# Patient Record
Sex: MALE | Race: WHITE | NOT HISPANIC OR LATINO | Employment: OTHER | URBAN - METROPOLITAN AREA
[De-identification: names, ages, dates, MRNs, and addresses within clinical notes are randomized per-mention and may not be internally consistent; named-entity substitution may affect disease eponyms.]

---

## 2019-01-08 ENCOUNTER — TELEPHONE (OUTPATIENT)
Dept: ENDOSCOPY | Facility: HOSPITAL | Age: 74
End: 2019-01-08

## 2019-01-08 DIAGNOSIS — K22.719 BARRETT'S ESOPHAGUS WITH DYSPLASIA: Primary | ICD-10-CM

## 2019-01-08 NOTE — TELEPHONE ENCOUNTER
----- Message from Elif Funez sent at 1/8/2019  8:58 AM CST -----  Regarding: Outside patient referral  Good morning!    Patient being referred to Dr. Victor for EGD with Halo for Horner's. Referral and records scanned into .    Thanks!  Elif

## 2019-01-08 NOTE — TELEPHONE ENCOUNTER
Message   Received: Today   Message Contents   MD Shaniqua Jack MA   Caller: Unspecified (Today, 10:19 AM)             Need EGD with RFA for Horner's with LGD.   Dimitri Leary MD      Please sign order

## 2019-01-09 ENCOUNTER — TELEPHONE (OUTPATIENT)
Dept: ENDOSCOPY | Facility: HOSPITAL | Age: 74
End: 2019-01-09

## 2019-01-09 RX ORDER — AMIODARONE HYDROCHLORIDE 200 MG/1
100 TABLET ORAL DAILY
COMMUNITY

## 2019-01-09 RX ORDER — CHOLECALCIFEROL (VITAMIN D3) 25 MCG
1 TABLET,CHEWABLE ORAL DAILY
Status: ON HOLD | COMMUNITY
End: 2021-04-09 | Stop reason: CLARIF

## 2019-01-09 RX ORDER — OMEPRAZOLE 20 MG/1
20 CAPSULE, DELAYED RELEASE ORAL DAILY
Status: ON HOLD | COMMUNITY
End: 2019-01-17 | Stop reason: SDUPTHER

## 2019-01-09 RX ORDER — ATORVASTATIN CALCIUM 80 MG/1
80 TABLET, FILM COATED ORAL NIGHTLY
COMMUNITY

## 2019-01-09 RX ORDER — ASPIRIN 81 MG/1
81 TABLET ORAL DAILY
COMMUNITY

## 2019-01-09 RX ORDER — ASCORBIC ACID 500 MG
500 TABLET ORAL DAILY
COMMUNITY
End: 2021-05-11 | Stop reason: ALTCHOICE

## 2019-01-09 RX ORDER — FERROUS SULFATE 325(65) MG
325 TABLET ORAL 2 TIMES DAILY
COMMUNITY

## 2019-01-09 NOTE — TELEPHONE ENCOUNTER
Spoke with patient. EGD scheduled for 1/17 at 10;30a. Reviewed prep instructions. Mr Gamble verbalized understanding.

## 2019-01-09 NOTE — TELEPHONE ENCOUNTER
Patient's wife called back.  Discussed instructions for EGD scheduled 1/17/19 at 1030.  Instructions emailed and mailed.

## 2019-01-17 ENCOUNTER — ANESTHESIA (OUTPATIENT)
Dept: ENDOSCOPY | Facility: HOSPITAL | Age: 74
End: 2019-01-17
Payer: MEDICARE

## 2019-01-17 ENCOUNTER — ANESTHESIA EVENT (OUTPATIENT)
Dept: ENDOSCOPY | Facility: HOSPITAL | Age: 74
End: 2019-01-17
Payer: MEDICARE

## 2019-01-17 ENCOUNTER — HOSPITAL ENCOUNTER (OUTPATIENT)
Facility: HOSPITAL | Age: 74
Discharge: HOME | End: 2019-01-17
Attending: INTERNAL MEDICINE | Admitting: INTERNAL MEDICINE
Payer: MEDICARE

## 2019-01-17 VITALS
HEIGHT: 72 IN | RESPIRATION RATE: 20 BRPM | SYSTOLIC BLOOD PRESSURE: 107 MMHG | HEART RATE: 70 BPM | DIASTOLIC BLOOD PRESSURE: 65 MMHG | BODY MASS INDEX: 33.86 KG/M2 | WEIGHT: 250 LBS | OXYGEN SATURATION: 94 % | TEMPERATURE: 98 F

## 2019-01-17 DIAGNOSIS — K22.719 BARRETT'S ESOPHAGUS WITH DYSPLASIA: Primary | ICD-10-CM

## 2019-01-17 PROCEDURE — 37000009 HC ANESTHESIA EA ADD 15 MINS: Performed by: INTERNAL MEDICINE

## 2019-01-17 PROCEDURE — D9220A PRA ANESTHESIA: Mod: ANES,,, | Performed by: ANESTHESIOLOGY

## 2019-01-17 PROCEDURE — 43270 PR EGD, FLEX, W/ABLATION, TUMOR/POLYP/LESION(S), W/ DILATION: ICD-10-PCS | Mod: ,,, | Performed by: INTERNAL MEDICINE

## 2019-01-17 PROCEDURE — 43270 EGD LESION ABLATION: CPT | Performed by: INTERNAL MEDICINE

## 2019-01-17 PROCEDURE — 63600175 PHARM REV CODE 636 W HCPCS: Performed by: NURSE ANESTHETIST, CERTIFIED REGISTERED

## 2019-01-17 PROCEDURE — 37000008 HC ANESTHESIA 1ST 15 MINUTES: Performed by: INTERNAL MEDICINE

## 2019-01-17 PROCEDURE — 43270 EGD LESION ABLATION: CPT | Mod: ,,, | Performed by: INTERNAL MEDICINE

## 2019-01-17 PROCEDURE — D9220A PRA ANESTHESIA: ICD-10-PCS | Mod: ANES,,, | Performed by: ANESTHESIOLOGY

## 2019-01-17 PROCEDURE — 25000003 PHARM REV CODE 250: Performed by: INTERNAL MEDICINE

## 2019-01-17 PROCEDURE — D9220A PRA ANESTHESIA: ICD-10-PCS | Mod: CRNA,,, | Performed by: NURSE ANESTHETIST, CERTIFIED REGISTERED

## 2019-01-17 PROCEDURE — 25000003 PHARM REV CODE 250: Performed by: NURSE ANESTHETIST, CERTIFIED REGISTERED

## 2019-01-17 PROCEDURE — D9220A PRA ANESTHESIA: Mod: CRNA,,, | Performed by: NURSE ANESTHETIST, CERTIFIED REGISTERED

## 2019-01-17 PROCEDURE — C1886 CATHETER, ABLATION: HCPCS | Performed by: INTERNAL MEDICINE

## 2019-01-17 RX ORDER — LIDOCAINE HCL/PF 100 MG/5ML
SYRINGE (ML) INTRAVENOUS
Status: DISCONTINUED | OUTPATIENT
Start: 2019-01-17 | End: 2019-01-17

## 2019-01-17 RX ORDER — SODIUM CHLORIDE 9 MG/ML
INJECTION, SOLUTION INTRAVENOUS CONTINUOUS
Status: DISCONTINUED | OUTPATIENT
Start: 2019-01-17 | End: 2019-01-18 | Stop reason: HOSPADM

## 2019-01-17 RX ORDER — SUCRALFATE 1 G/1
TABLET ORAL
Qty: 60 TABLET | Refills: 0 | Status: ON HOLD | OUTPATIENT
Start: 2019-01-17 | End: 2019-04-16 | Stop reason: SDUPTHER

## 2019-01-17 RX ORDER — GLYCOPYRROLATE 0.2 MG/ML
INJECTION INTRAMUSCULAR; INTRAVENOUS
Status: DISCONTINUED | OUTPATIENT
Start: 2019-01-17 | End: 2019-01-17

## 2019-01-17 RX ORDER — SODIUM CHLORIDE 0.9 % (FLUSH) 0.9 %
3 SYRINGE (ML) INJECTION
Status: DISCONTINUED | OUTPATIENT
Start: 2019-01-17 | End: 2019-01-18 | Stop reason: HOSPADM

## 2019-01-17 RX ORDER — PROPOFOL 10 MG/ML
INJECTION, EMULSION INTRAVENOUS
Status: DISCONTINUED | OUTPATIENT
Start: 2019-01-17 | End: 2019-01-17

## 2019-01-17 RX ORDER — OMEPRAZOLE 20 MG/1
40 CAPSULE, DELAYED RELEASE ORAL 2 TIMES DAILY
Qty: 120 CAPSULE | Refills: 2 | Status: ON HOLD | OUTPATIENT
Start: 2019-01-17 | End: 2019-04-16 | Stop reason: SDUPTHER

## 2019-01-17 RX ORDER — OMEPRAZOLE 20 MG/1
40 CAPSULE, DELAYED RELEASE ORAL 2 TIMES DAILY
Qty: 120 CAPSULE | Refills: 2 | Status: SHIPPED | OUTPATIENT
Start: 2019-01-17 | End: 2019-01-17 | Stop reason: SDUPTHER

## 2019-01-17 RX ORDER — PROPOFOL 10 MG/ML
INJECTION, EMULSION INTRAVENOUS CONTINUOUS PRN
Status: DISCONTINUED | OUTPATIENT
Start: 2019-01-17 | End: 2019-01-17

## 2019-01-17 RX ORDER — FENTANYL CITRATE 50 UG/ML
INJECTION, SOLUTION INTRAMUSCULAR; INTRAVENOUS
Status: DISCONTINUED | OUTPATIENT
Start: 2019-01-17 | End: 2019-01-17

## 2019-01-17 RX ORDER — HYDROCODONE BITARTRATE AND ACETAMINOPHEN 7.5; 325 MG/15ML; MG/15ML
15 SOLUTION ORAL EVERY 6 HOURS PRN
Qty: 420 ML | Refills: 0 | Status: SHIPPED | OUTPATIENT
Start: 2019-01-17 | End: 2019-01-24

## 2019-01-17 RX ADMIN — PROPOFOL 30 MG: 10 INJECTION, EMULSION INTRAVENOUS at 10:01

## 2019-01-17 RX ADMIN — PROPOFOL 70 MG: 10 INJECTION, EMULSION INTRAVENOUS at 10:01

## 2019-01-17 RX ADMIN — SODIUM CHLORIDE: 0.9 INJECTION, SOLUTION INTRAVENOUS at 10:01

## 2019-01-17 RX ADMIN — PROPOFOL 200 MCG/KG/MIN: 10 INJECTION, EMULSION INTRAVENOUS at 10:01

## 2019-01-17 RX ADMIN — LIDOCAINE HYDROCHLORIDE 100 MG: 20 INJECTION, SOLUTION INTRAVENOUS at 10:01

## 2019-01-17 RX ADMIN — FENTANYL CITRATE 25 MCG: 50 INJECTION, SOLUTION INTRAMUSCULAR; INTRAVENOUS at 10:01

## 2019-01-17 RX ADMIN — GLYCOPYRROLATE 0.2 MG: 0.2 INJECTION, SOLUTION INTRAMUSCULAR; INTRAVENOUS at 10:01

## 2019-01-17 NOTE — DISCHARGE SUMMARY
Discharge Summary/Instructions after Ablation (Burning or Freezing) of Horner's Esophagus using Radiofrequency Ablation or Cryotherapy    Patient Name: Devon Berry  Patient MRN: [id number]  Patient YOB: 1945 Thursday, January 17, 2019  Dimitri Leary MD    You have undergone an upper endoscopy with ablation (burning or freezing) of Horner's esophagus.  You may experience one of the following symptoms after treatment:  chest discomfort, sore throat, difficulty or pain with swallowing, and/or nausea/vomiting.  These symptoms should improve with each day.  You will be provided with several medications and specific instructions (below) to make you as comfortable as possible during this time.  Should any of your symptoms be more severe in nature or longer in duration than we have described, please contact us.  It is important to continue a strict and long-term regimen of anti-secretory medication after this treatment, such as Nexium, Prevacid or other similar drugs.    - Maximize anti-secretory regimen (for example, Nexium 40 mg twice per day for 1-3 months, followed by at least 40 mg per day thereafter).    - Liquid Tylenol with or without codeine by mouth as needed.    - Avoid aspirin or non-steroidal anti-inflammatory medications for 7 days.     - Contact your treating physician immediately for significant chest pain, difficulty swallowing, fever, bleeding, abdominal pain,      difficulty breathing, vomiting.    - If you seek care for a digestive issue from any healthcare provider in the 6 months following the ablation procedure, the physician     who performed the ablation should be consulted before any treatment is initiated.    Your doctor recommends these additional instructions:    You are being discharged to home.   Your physician has recommended a repeat upper endoscopy in 12 weeks for follow-up of Horner's ablation.   Resume your previous diet.   Take Prilosec (omeprazole) 40 mg by mouth  twice a day.   Take Carafate (sucralfate) suspension 1 gram by mouth twice a day.    If you have any questions or problems, please call your physician.  EMERGENCY PHONE NUMBER: [phone]  LAB RESULTS: [phone]

## 2019-01-17 NOTE — H&P
History & Physical - Short Stay  Gastroenterology      SUBJECTIVE:     Procedure: EGD    Chief Complaint/Indication for Procedure: Horner's Esophagus    History of Present Illness:  Patient is a 73 y.o. male presents with Horner's with LGD here for ablation therapy.     PTA Medications   Medication Sig    amiodarone (PACERONE) 200 MG Tab Take 100 mg by mouth once daily.    ascorbic acid, vitamin C, (VITAMIN C) 500 MG tablet Take 500 mg by mouth once daily.    aspirin (ECOTRIN) 81 MG EC tablet Take 81 mg by mouth once daily.    atorvastatin (LIPITOR) 20 MG tablet Take 20 mg by mouth every evening.     cyanocobalamin, vitamin B-12, 2,500 mcg Chew Take 1 tablet by mouth once daily.    ferrous sulfate (FEOSOL) 325 mg (65 mg iron) Tab tablet Take 325 mg by mouth 2 (two) times daily.     multivitamin with minerals tablet Take 1 tablet by mouth once daily.    omeprazole (PRILOSEC) 20 MG capsule Take 20 mg by mouth once daily.       Review of patient's allergies indicates:  No Known Allergies     Past Medical History:   Diagnosis Date    Horner's esophagus     CAD (coronary artery disease)     Colon cancer     Colon polyps     Diverticulosis of colon     GERD (gastroesophageal reflux disease)     Heartburn     Hemorrhoids     HTN (hypertension)     Hyperlipidemia     Pneumonia      Past Surgical History:   Procedure Laterality Date    COLONOSCOPY      CORONARY ARTERY BYPASS GRAFT  03/04/2016    3 vessels    ESOPHAGOGASTRODUODENOSCOPY      HEMORRHOID SURGERY      SIGMOIDECTOMY  2015    TONSILLECTOMY, ADENOIDECTOMY       Family History   Problem Relation Age of Onset    Heart disease Father      Social History     Tobacco Use    Smoking status: Current Every Day Smoker     Types: Cigarettes    Smokeless tobacco: Never Used   Substance Use Topics    Alcohol use: Yes     Comment: 3.6 oz/week    Drug use: No       Review of Systems:  Constitutional: no fever or chills  ENT: positive for nasal  congestion  Respiratory: positive for cough  Cardiovascular: no chest pain or palpitations  Gastrointestinal: no nausea or vomiting, no abdominal pain or change in bowel habits, positive for reflux symptoms    OBJECTIVE:     Vital Signs (Most Recent)       Physical Exam:  General: well developed, well nourished  Lungs:  clear to auscultation bilaterally and normal respiratory effort  Heart: regular rate, S1, S2 normal  Abdomen: soft, non-tender non-distented; bowel sounds normal; no masses,  no organomegaly    Laboratory  CBC: No results for input(s): WBC, RBC, HGB, HCT, PLT, MCV, MCH, MCHC in the last 168 hours.  CMP: No results for input(s): GLU, CALCIUM, ALBUMIN, PROT, NA, K, CO2, CL, BUN, CREATININE, ALKPHOS, ALT, AST, BILITOT in the last 168 hours.  Coagulation: No results for input(s): LABPROT, INR, APTT in the last 168 hours.      Diagnostic Results:      ASSESSMENT/PLAN:     Horner's with LGD    Plan: EGD    Anesthesia Plan: MAC    ASA Grade: ASA 3 - Patient with moderate systemic disease with functional limitations     The impression and plan was discussed in detail with the patient and family. All questions have been answered and the patient voices understanding of our plan at this point. The risk of the procedure was discussed in detail which includes but not limited to bleeding, infection, perforation in some cases requiring surgery with its spectrum of complications.

## 2019-01-17 NOTE — [employer] PATIENT INSTRUCTIONS
Discharge Summary/Instructions after Ablation (Burning or Freezing) of   Horner's Esophagus using Radiofrequency Ablation or Cryotherapy  Patient Name: Devon Berry  Patient MRN: [id number]  Patient YOB: 1945 Thursday, January 17, 2019  Dimitri Leary MD  You have undergone an upper endoscopy with ablation (burning or freezing) of   Horner's esophagus.  You may experience one of the following symptoms   after treatment:  chest discomfort, sore throat, difficulty or pain with   swallowing, and/or nausea/vomiting.  These symptoms should improve with   each day.  You will be provided with several medications and specific   instructions (below) to make you as comfortable as possible during this   time.  Should any of your symptoms be more severe in nature or longer in   duration than we have described, please contact us.  It is important to   continue a strict and long-term regimen of anti-secretory medication after   this treatment, such as Nexium, Prevacid or other similar drugs.  - Maximize anti-secretory regimen (for example, Nexium 40 mg twice per day   for 1-3 months, followed by at least 40 mg per day thereafter).  - Liquid Tylenol with or without codeine by mouth as needed.  - Full liquid diet for 24 hours, then advancing to soft diet for 1 week.  - Avoid aspirin or non-steroidal anti-inflammatory medications for 7 days.      - Contact your treating physician immediately for significant chest pain,   difficulty swallowing, fever, bleeding, abdominal pain,      difficulty breathing, vomiting.  - If you seek care for a digestive issue from any healthcare provider in the   6 months following the ablation procedure, the physician     who performed the ablation should be consulted before any treatment is   initiated.  Your doctor recommends these additional instructions:  You are being discharged to home.   Your physician has recommended a repeat upper endoscopy in 12 weeks for   follow-up of  Horner's ablation.   Resume your previous diet.   Take Prilosec (omeprazole) 40 mg by mouth twice a day.   Take Carafate (sucralfate) suspension 1 gram by mouth twice a day.  If you have any questions or problems, please call your physician.  EMERGENCY PHONE NUMBER: [phone]  LAB RESULTS: [phone]  Dimitri Leary MD  1/17/2019 11:07:08 AM  This report has been verified and signed electronically.  [employer]

## 2019-01-17 NOTE — ANESTHESIA PREPROCEDURE EVALUATION
01/17/2019  Devon Berry is a 73 y.o., male.    Anesthesia Evaluation         Review of Systems  Social:  Smoker, No Alcohol Use   Hematology/Oncology:        Oncology Comments: Colon cancer   Cardiovascular:   Hypertension Past MI CAD  CABG/stent Dysrhythmias atrial fibrillation hyperlipidemia EF-30%   Hepatic/GI:   GERD Horner's esophagus,  Diverticulosis of colon,  Colon cancer       Physical Exam  General:  Well nourished    Airway/Jaw/Neck:  Airway Findings: Mouth Opening: Normal Tongue: Normal  General Airway Assessment: Adult  Mallampati: III  TM Distance: Normal, at least 6 cm      Dental:  Dental Findings: In tact        Mental Status:  Mental Status Findings:  Alert and Oriented, Cooperative         Anesthesia Plan  Type of Anesthesia, risks & benefits discussed:  Anesthesia Type:  general  Patient's Preference:   Intra-op Monitoring Plan: standard ASA monitors  Intra-op Monitoring Plan Comments:   Post Op Pain Control Plan:   Post Op Pain Control Plan Comments:   Induction:   IV  Beta Blocker:  Patient is not currently on a Beta-Blocker (No further documentation required).       Informed Consent: Patient understands risks and agrees with Anesthesia plan.  Questions answered. Anesthesia consent signed with patient.  ASA Score: 3     Day of Surgery Review of History & Physical:    H&P update referred to the provider.         Ready For Surgery From Anesthesia Perspective.

## 2019-01-17 NOTE — DISCHARGE INSTRUCTIONS
Upper GI Endoscopy     During endoscopy, a long, flexible tube is used to view the inside of your upper GI tract.      Upper GI endoscopy allows your healthcare provider to look directly into the beginning of your gastrointestinal (GI) tract. The esophagus, stomach, and duodenum (the first part of the small intestine) make up the upper GI tract.   Before the exam  Follow these and any other instructions you are given before your endoscopy. If you dont follow the healthcare providers instructions carefully, the test may need to be canceled or done over:  · Don't eat or drink anything after midnight the night before your exam. If your exam is in the afternoon, drink only clear liquids in the morning. Don't eat or drink anything for 8 hours before the exam. In some cases, you may be able to take medicines with sips of water until 2 hours before the procedure. Speak with your healthcare provider about this.   · Bring your X-rays and any other test results you have.  · Because you will be sedated, arrange for an adult to drive you home after the exam.  · Tell your healthcare provider before the exam if you are taking any medicines or have any medical problems.  The procedure  Here is what to expect:  · You will lie on the endoscopy table. Usually patients lie on the left side.  · You will be monitored and given oxygen.  · Your throat may be numbed with a spray or gargle. You are given medicine through an intravenous (IV) line that will help you relax and remain comfortable. You may be awake or asleep during the procedure.  · The healthcare provider will put the endoscope in your mouth and down your esophagus. It is thinner than most pieces of food that you swallow. It will not affect your breathing. The medicine helps keep you from gagging.  · Air is put into your GI tract to expand it. It can make you burp.  · During the procedure, the healthcare provider can take biopsies (tissue samples), remove abnormalities,  such as polyps, or treat abnormalities through a variety of devices placed through the endoscope. You will not feel this.   · The endoscope carries images of your upper GI tract to a video screen. If you are awake, you may be able to look at the images.  · After the procedure is done, you will rest for a time. An adult must drive you home.  When to call your healthcare provider  Contact your healthcare provider if you have:  · Black or tarry stools, or blood in your stool  · Fever  · Pain in your belly that does not go away  · Nausea and vomiting, or vomiting blood   Date Last Reviewed: 7/1/2016 © 2000-2017 [employer]. [address] [place] [address], [place] [address]. All rights reserved. This information is not intended as a substitute for professional medical care. Always follow your healthcare professional's instructions.        Anesthesia: General Anesthesia     You are watched continuously during your procedure by your anesthesia provider.     Youre due to have surgery. During surgery, youll be given medicine called anesthesia or anesthetic. This will keep you comfortable and pain-free. Your anesthesia provider will use general anesthesia.  What is general anesthesia?  General anesthesia puts you into a state like deep sleep. It goes into the bloodstream (IV anesthetics), into the lungs (gas anesthetics), or both. You feel nothing during the procedure. You will not remember it. During the procedure, the anesthesia provider monitors you continuously. He or she checks your heart rate and rhythm, blood pressure, breathing, and blood oxygen.  · IV anesthetics. IV anesthetics are given through an IV line in your arm. Theyre often given first. This is so you are asleep before a gas anesthetic is started. Some kinds of IV anesthetics relieve pain. Others relax you. Your doctor will decide which kind is best in your case.  · Gas anesthetics. Gas anesthetics are breathed into the lungs. They are often used  to keep you asleep. They can be given through a facemask or a tube placed in your larynx or trachea (breathing tube).  ¨ If you have a facemask, your anesthesia provider will most likely place it over your nose and mouth while youre still awake. Youll breathe oxygen through the mask as your IV anesthetic is started. Gas anesthetic may be added through the mask.  ¨ If you have a tube in the larynx or trachea, it will be inserted into your throat after youre asleep.  Anesthesia tools and medicines  You will likely have:  · IV anesthetics. These are put into an IV line into your bloodstream.  · Gas anesthetics. You breathe these anesthetics into your lungs, where they pass into your bloodstream.  · Pulse oximeter. This is a small clip that is attached to the end of your finger. This measures your blood oxygen level.  · Electrocardiography leads (electrodes). These are small sticky pads that are placed on your chest. They record your heart rate and rhythm.  · Blood pressure cuff. This reads your blood pressure.  Risks and possible complications  General anesthesia has some risks. These include:  · Breathing problems  · Nausea and vomiting  · Sore throat or hoarseness (usually temporary)  · Allergic reaction to the anesthetic  · Irregular heartbeat (rare)  · Cardiac arrest (rare)   Anesthesia safety  · Follow all instructions you are given for how long not to eat or drink before your procedure.  · Be sure your doctor knows what medicines and drugs you take. This includes over-the-counter medicines, herbs, supplements, alcohol or other drugs. You will be asked when those were last taken.  · Have an adult family member or friend drive you home after the procedure.  · For the first 24 hours after your surgery:  ¨ Do not drive or use heavy equipment.  ¨ Do not make important decisions or sign legal documents. If important decisions or signing legal documents is necessary during the first 24 hours after surgery, have a  trusted family member or spouse act on your behalf.  ¨ Avoid alcohol.  ¨ Have a responsible adult stay with you. He or she can watch for problems and help keep you safe.  Date Last Reviewed: 12/1/2016  © 2053-4485 [employer]. [address] [place] [address], [place] [address]. All rights reserved. This information is not intended as a substitute for professional medical care. Always follow your healthcare professional's instructions.

## 2019-01-17 NOTE — TRANSFER OF CARE
Anesthesia Transfer of Care Note    Patient: Devon Brery    Procedure(s) Performed: Procedure(s) (LRB):  EGD (ESOPHAGOGASTRODUODENOSCOPY) (N/A)    Patient location: [facility]    Anesthesia Type: general    Transport from OR: Transported from OR on 6-10 L/min O2 by face mask with adequate spontaneous ventilation    Post pain: adequate analgesia    Post assessment: no apparent anesthetic complications and tolerated procedure well    Post vital signs: stable    Level of consciousness: awake and responds to stimulation    Nausea/Vomiting: no nausea/vomiting    Complications: none    Transfer of care protocol was followed      Last vitals:   Visit Vitals  BP (!) 99/57 (BP Location: Left arm, Patient Position: Lying)   Pulse 76   Temp 36.4 °C (97.5 °F) (Skin)   Resp 18   Ht 6' (1.829 m)   Wt 113.4 kg (250 lb)   SpO2 97%   BMI 33.91 kg/m²

## 2019-01-18 ENCOUNTER — TELEPHONE (OUTPATIENT)
Dept: ENDOSCOPY | Facility: HOSPITAL | Age: 74
End: 2019-01-18

## 2019-01-18 DIAGNOSIS — K22.719 BARRETT'S ESOPHAGUS WITH DYSPLASIA: Primary | ICD-10-CM

## 2019-01-18 NOTE — ANESTHESIA POSTPROCEDURE EVALUATION
Anesthesia Post Evaluation    Patient: Devon Berry    Procedure(s) Performed: Procedure(s) (LRB):  EGD (ESOPHAGOGASTRODUODENOSCOPY) (N/A)    Final Anesthesia Type: general  Patient location during evaluation: PACU  Patient participation: Yes- Able to Participate  Level of consciousness: awake and alert  Post-procedure vital signs: reviewed and stable  Pain management: adequate  Airway patency: patent  PONV status at discharge: No PONV  Anesthetic complications: no      Cardiovascular status: blood pressure returned to baseline  Respiratory status: unassisted  Hydration status: euvolemic  Follow-up not needed.        Visit Vitals  /65 (BP Location: Left arm, Patient Position: Lying)   Pulse 70   Temp 36.5 °C (97.7 °F) (Skin)   Resp 20   Ht 6' (1.829 m)   Wt 113.4 kg (250 lb)   SpO2 (!) 94%   BMI 33.91 kg/m²       Pain/Angel Score: Angel Score: 10 (1/17/2019 11:35 AM)

## 2019-02-11 ENCOUNTER — TELEPHONE (OUTPATIENT)
Dept: ENDOSCOPY | Facility: HOSPITAL | Age: 74
End: 2019-02-11

## 2019-02-11 NOTE — TELEPHONE ENCOUNTER
MD Shaniqua Jack MA   Caller: Unspecified (Today, 12:02 PM)             Ok to stop the Carafate. I will continue the same dose of the PPI's. If not improvement in 2-3 days then he need to contact us for recommendations.   Thanks   Dimitri Leary MD     Left message

## 2019-02-11 NOTE — TELEPHONE ENCOUNTER
----- Message from Esha Byers sent at 2/8/2019  9:34 AM CST -----  Contact: Self- [phone]  Gibran- pt called to speak with Stephanie regarding some issues following scope last week- gas in stomach and change in bowel habits- only details relayed during call- please contact pt at [phone]

## 2019-02-11 NOTE — TELEPHONE ENCOUNTER
Patient says he is having a lot of gas and loose stools. He says it seem to start after the started taking carafate and increased PPI. He stopped the carafate. Wants to know if ok. Also wants to know if he should decrease PPI

## 2019-02-12 ENCOUNTER — TELEPHONE (OUTPATIENT)
Dept: ENDOSCOPY | Facility: HOSPITAL | Age: 74
End: 2019-02-12

## 2019-02-12 NOTE — TELEPHONE ENCOUNTER
Spoke with patient. Informed of Dr Leary's message from yesterday. Mr Berry verbalized understanding.

## 2019-02-12 NOTE — TELEPHONE ENCOUNTER
----- Message from Esha Byers sent at 2/12/2019  8:42 AM CST -----  Contact: Self- [phone]  Gibran- pt returning a missed call regarding carafate instructions- please contact pt at [phone]

## 2019-03-07 ENCOUNTER — TELEPHONE (OUTPATIENT)
Dept: ENDOSCOPY | Facility: HOSPITAL | Age: 74
End: 2019-03-07

## 2019-03-07 NOTE — LETTER
[facility]  [address]  [facility] [address]  Phone: [phone]  Fax: [phone]           EGD Prep Instructions    [facility] [address]  Clinic Office [phone]  Endoscopy Lab [phone]    You are scheduled for an EGD with Dr. Leary on 04/16/2019 at 830am at [facility].  You will check in at Admit on the first floor of the hospital.    You may not have nothing to eat after 7pm and nothing to drink after midnight.  You can drink clear liquids between 7pm and midnight.    You MAY take your blood pressure, heart, and seizure medication on the morning of the procedure, with a SIP of water.  Hold ALL other medications until after the procedure.    If you are on blood thinners THAT YOU HAVE BEEN INSTRUCTED TO HOLD BY YOUR DOCTOR FOR THIS PROCEDURE, then do NOT take this the morning of your EGD.  Do NOT stop these medications on your own, they must be approved to be held by your doctor.  Your EGD can NOT be done if you are on these medications.  Examples of blood thinners include: Coumadin, Aggrenox, Plavix, Pradaxa, Reapro, Pletal, Xarelto, Ticagrelor, Brilinta, Eliquis, and high dose aspirin (325 mg).  You do not have to stop baby aspirin 81 mg.

## 2019-03-12 ENCOUNTER — TELEPHONE (OUTPATIENT)
Dept: GASTROENTEROLOGY | Facility: CLINIC | Age: 74
End: 2019-03-12

## 2019-03-12 NOTE — TELEPHONE ENCOUNTER
----- Message from Marilyn Sorto sent at 3/12/2019 10:29 AM CDT -----  Contact: Pt wife Damari can be reached at [phone]  Pt is calling to speak with the nurse to discuss pt colonoscopy instructions. Pt is requesting to speak with Anish      Thank you!

## 2019-03-13 ENCOUNTER — TELEPHONE (OUTPATIENT)
Dept: GASTROENTEROLOGY | Facility: CLINIC | Age: 74
End: 2019-03-13

## 2019-03-13 NOTE — TELEPHONE ENCOUNTER
----- Message from Rani Phillips sent at 3/13/2019  3:42 PM CDT -----  Contact: wife/Damari  [phone]  Patient wife states she's returning your call.  Please call and advise

## 2019-04-12 ENCOUNTER — TELEPHONE (OUTPATIENT)
Dept: ENDOSCOPY | Facility: HOSPITAL | Age: 74
End: 2019-04-12

## 2019-04-12 NOTE — TELEPHONE ENCOUNTER
Spoke with patient about arrival time @ *. 0900    NPO status reviewed: Patient must have nothing to eat after midnight.  Pt may have CLEAR liquids ONLY until completely NPO 3 hrs @ *0600    Medications: Do not take Insulin or oral diabetic medications the day of the procedure.  Take as prescribed: heart, seizure and blood pressure medication in the morning with a sip of water (less than an ounce).  Take any breathing medications and bring inhalers to hospital with you Leave all valuables and jewelry at home.     Wear comfortable clothes to procedure to change into hospital gown You cannot drive for 24 hours after your procedure because you will receive sedation for your procedure to make you comfortable.  A ride must be provided at discharge.

## 2019-04-16 ENCOUNTER — ANESTHESIA (OUTPATIENT)
Dept: ENDOSCOPY | Facility: HOSPITAL | Age: 74
End: 2019-04-16
Payer: MEDICARE

## 2019-04-16 ENCOUNTER — ANESTHESIA EVENT (OUTPATIENT)
Dept: ENDOSCOPY | Facility: HOSPITAL | Age: 74
End: 2019-04-16
Payer: MEDICARE

## 2019-04-16 ENCOUNTER — HOSPITAL ENCOUNTER (OUTPATIENT)
Facility: HOSPITAL | Age: 74
Discharge: HOME | End: 2019-04-16
Attending: INTERNAL MEDICINE | Admitting: INTERNAL MEDICINE
Payer: MEDICARE

## 2019-04-16 VITALS
RESPIRATION RATE: 19 BRPM | HEIGHT: 71 IN | TEMPERATURE: 98 F | HEART RATE: 62 BPM | BODY MASS INDEX: 35 KG/M2 | SYSTOLIC BLOOD PRESSURE: 140 MMHG | DIASTOLIC BLOOD PRESSURE: 92 MMHG | WEIGHT: 250 LBS | OXYGEN SATURATION: 96 %

## 2019-04-16 DIAGNOSIS — K22.719 BARRETT'S ESOPHAGUS WITH DYSPLASIA: Primary | ICD-10-CM

## 2019-04-16 PROCEDURE — 37000009 HC ANESTHESIA EA ADD 15 MINS: Performed by: INTERNAL MEDICINE

## 2019-04-16 PROCEDURE — 43270 PR EGD, FLEX, W/ABLATION, TUMOR/POLYP/LESION(S), W/ DILATION: ICD-10-PCS | Mod: ,,, | Performed by: INTERNAL MEDICINE

## 2019-04-16 PROCEDURE — 43270 EGD LESION ABLATION: CPT | Mod: ,,, | Performed by: INTERNAL MEDICINE

## 2019-04-16 PROCEDURE — 63600175 PHARM REV CODE 636 W HCPCS: Performed by: NURSE ANESTHETIST, CERTIFIED REGISTERED

## 2019-04-16 PROCEDURE — 43270 EGD LESION ABLATION: CPT | Performed by: INTERNAL MEDICINE

## 2019-04-16 PROCEDURE — 25000003 PHARM REV CODE 250: Performed by: INTERNAL MEDICINE

## 2019-04-16 PROCEDURE — C1886 CATHETER, ABLATION: HCPCS | Performed by: INTERNAL MEDICINE

## 2019-04-16 PROCEDURE — 43257 EGD W/THRML TXMNT GERD: CPT | Performed by: INTERNAL MEDICINE

## 2019-04-16 PROCEDURE — 37000008 HC ANESTHESIA 1ST 15 MINUTES: Performed by: INTERNAL MEDICINE

## 2019-04-16 RX ORDER — SUCRALFATE 1 G/1
TABLET ORAL
Qty: 60 TABLET | Refills: 0 | Status: ON HOLD | OUTPATIENT
Start: 2019-04-16 | End: 2019-07-22 | Stop reason: HOSPADM

## 2019-04-16 RX ORDER — OMEPRAZOLE 20 MG/1
40 CAPSULE, DELAYED RELEASE ORAL 2 TIMES DAILY
Qty: 120 CAPSULE | Refills: 2 | Status: SHIPPED | OUTPATIENT
Start: 2019-04-16 | End: 2019-10-31

## 2019-04-16 RX ORDER — SODIUM CHLORIDE 9 MG/ML
INJECTION, SOLUTION INTRAVENOUS CONTINUOUS
Status: DISCONTINUED | OUTPATIENT
Start: 2019-04-16 | End: 2019-04-16 | Stop reason: HOSPADM

## 2019-04-16 RX ORDER — PROPOFOL 10 MG/ML
VIAL (ML) INTRAVENOUS
Status: DISCONTINUED | OUTPATIENT
Start: 2019-04-16 | End: 2019-04-16

## 2019-04-16 RX ORDER — PROPOFOL 10 MG/ML
VIAL (ML) INTRAVENOUS CONTINUOUS PRN
Status: DISCONTINUED | OUTPATIENT
Start: 2019-04-16 | End: 2019-04-16

## 2019-04-16 RX ORDER — SODIUM CHLORIDE 0.9 % (FLUSH) 0.9 %
10 SYRINGE (ML) INJECTION
Status: DISCONTINUED | OUTPATIENT
Start: 2019-04-16 | End: 2019-04-16 | Stop reason: HOSPADM

## 2019-04-16 RX ORDER — LIDOCAINE HCL/PF 100 MG/5ML
SYRINGE (ML) INTRAVENOUS
Status: DISCONTINUED | OUTPATIENT
Start: 2019-04-16 | End: 2019-04-16

## 2019-04-16 RX ADMIN — SODIUM CHLORIDE: 0.9 INJECTION, SOLUTION INTRAVENOUS at 09:04

## 2019-04-16 RX ADMIN — LIDOCAINE HYDROCHLORIDE 75 MG: 20 INJECTION, SOLUTION INTRAVENOUS at 09:04

## 2019-04-16 RX ADMIN — PROPOFOL 80 MG: 10 INJECTION, EMULSION INTRAVENOUS at 09:04

## 2019-04-16 RX ADMIN — PROPOFOL 150 MCG/KG/MIN: 10 INJECTION, EMULSION INTRAVENOUS at 09:04

## 2019-04-16 NOTE — ANESTHESIA POSTPROCEDURE EVALUATION
Anesthesia Post Evaluation    Patient: Devon Berry    Procedure(s) Performed: Procedure(s) (LRB):  EGD (ESOPHAGOGASTRODUODENOSCOPY), Horner's (N/A)    Final Anesthesia Type: MAC  Patient location during evaluation: GI PACU  Patient participation: Yes- Able to Participate  Level of consciousness: awake and alert and oriented  Post-procedure vital signs: reviewed and stable  Pain management: adequate  Airway patency: patent  PONV status at discharge: No PONV  Anesthetic complications: no      Cardiovascular status: blood pressure returned to baseline  Respiratory status: unassisted, room air and spontaneous ventilation  Hydration status: euvolemic  Follow-up not needed.          Vitals Value Taken Time   /76 4/16/2019  9:08 AM   Temp 36.8 °C (98.2 °F) 4/16/2019  9:08 AM   Pulse 64 4/16/2019  9:08 AM   Resp 16 4/16/2019  9:08 AM   SpO2 94 % 4/16/2019  9:08 AM         No case tracking events are documented in the log.      Pain/Angel Score: No data recorded

## 2019-04-16 NOTE — [employer] PATIENT INSTRUCTIONS
Discharge Summary/Instructions after an Endoscopic Procedure  Patient Name: Devon Berry  Patient MRN: [id number]  Patient YOB: 1945 Tuesday, April 16, 2019  Dimitri Leary MD  RESTRICTIONS:  During your procedure today, you received medications for sedation.  These   medications may affect your judgment, balance and coordination.  Therefore,   for 24 hours, you have the following restrictions:   - DO NOT drive a car, operate machinery, make legal/financial decisions,   sign important papers or drink alcohol.    ACTIVITY:  Today: no heavy lifting, straining or running due to procedural   sedation/anesthesia.  The following day: return to full activity including work.  DIET:  Eat and drink normally unless instructed otherwise.     TREATMENT FOR COMMON SIDE EFFECTS:  - Mild abdominal pain, nausea, belching, bloating or excessive gas:  rest,   eat lightly and use a heating pad.  - Sore Throat: treat with throat lozenges and/or gargle with warm salt   water.  - Because air was used during the procedure, expelling large amounts of air   from your rectum or belching is normal.  - If a bowel prep was taken, you may not have a bowel movement for 1-3 days.    This is normal.  SYMPTOMS TO WATCH FOR AND REPORT TO YOUR PHYSICIAN:  1. Abdominal pain or bloating, other than gas cramps.  2. Chest pain.  3. Back pain.  4. Signs of infection such as: chills or fever occurring within 24 hours   after the procedure.  5. Rectal bleeding, which would show as bright red, maroon, or black stools.   (A tablespoon of blood from the rectum is not serious, especially if   hemorrhoids are present.)  6. Vomiting.  7. Weakness or dizziness.  GO DIRECTLY TO THE NEAREST EMERGENCY ROOM IF YOU HAVE ANY OF THE FOLLOWING:      Difficulty breathing              Chills and/or fever over 101 F   Persistent vomiting and/or vomiting blood   Severe abdominal pain   Severe chest pain   Black, tarry stools   Bleeding- more than one  tablespoon   Any other symptom or condition that you feel may need urgent attention  Your doctor recommends these additional instructions:  If any biopsies were taken, your doctors clinic will contact you in 1 to 2   weeks with any results.  - Discharge patient to home (ambulatory).   - Resume previous diet.   - Repeat upper endoscopy in 12 weeks for follow-up of Horner's ablation.   - Use Prilosec (omeprazole) 40 mg PO BID.   - Use sucralfate suspension 1 gram PO BID.  For questions, problems or results please call your physician - Dimitri Leary MD at Work:  [phone].  EMERGENCY PHONE NUMBER: [phone],  LAB RESULTS: [phone]  IF A COMPLICATION OR EMERGENCY SITUATION ARISES AND YOU ARE UNABLE TO REACH   YOUR PHYSICIAN - GO DIRECTLY TO THE EMERGENCY ROOM.  Dimitri Leary MD  4/16/2019 9:44:27 AM  This report has been verified and signed electronically.  [employer]

## 2019-04-16 NOTE — TRANSFER OF CARE
"Anesthesia Transfer of Care Note    Patient: Devon Berry    Procedure(s) Performed: Procedure(s) (LRB):  EGD (ESOPHAGOGASTRODUODENOSCOPY), Horner's (N/A)    Patient location: GI    Anesthesia Type: MAC    Transport from OR: Transported from OR on 2-3 L/min O2 by NC with adequate spontaneous ventilation    Post pain: adequate analgesia    Post assessment: no apparent anesthetic complications    Post vital signs: stable    Level of consciousness: awake    Nausea/Vomiting: no nausea/vomiting    Complications: none    Transfer of care protocol was followed      Last vitals:   Visit Vitals  /76   Pulse 64   Temp 36.8 °C (98.2 °F)   Resp 16   Ht 5' 11" (1.803 m)   Wt 113.4 kg (250 lb)   SpO2 (!) 94%   BMI 34.87 kg/m²     "

## 2019-04-16 NOTE — PLAN OF CARE
Past Medical History:   Diagnosis Date    Horner's esophagus     CAD (coronary artery disease)     Colon cancer     Colon polyps     Diverticulosis of colon     GERD (gastroesophageal reflux disease)     Heartburn     Hemorrhoids     HTN (hypertension)     Hyperlipidemia     Pneumonia      Dr. Leary visited at bedside, discussed findings and recommendations with patient and family member; all questions asked and answered. Verbalized understanding of information give. Handout provided at time of discharge.

## 2019-04-16 NOTE — DISCHARGE INSTRUCTIONS
Understanding Radiofrequency Ablation (RFA) for Horners Esophagus    The esophagus is the tube that joins the mouth to the stomach. In people with GERD (gastroesophageal reflux disease), stomach acid flows back into the esophagus. Over time, stomach acid can hurt the esophagus. Horners esophagus is a condition where cells in the esophagus lining change to a different type of cell. The changed cells may protect the esophagus from stomach acid. But these cells are more likely than normal lining cells to become cancerous.  Radiofrequency ablation (RFA) is a procedure that destroys the changed cells. This can help prevent or treat cancer of the esophagus.  Why RFA for Horners esophagus is done  This procedure removes the changed lining from the esophagus. It is most often advised for people who show precancerous changes in the lining and very early cancer. RFA destroys the abnormal lining. Normal lining then grows back to replace it.  How RFA for Horners esophagus is done  RFA is most often done on an outpatient basis. That means you go home the same day. During the procedure:  · You are given medicine (anesthesia) to prevent pain and make you sleep during the procedure.  · The doctor puts a thin, lighted tube (endoscope) into your mouth. He or she passes the tube down your throat into the esophagus. The tube sends live video from inside the esophagus to a computer screen.  · A wire with a tiny electrode attached is put through the tube. The doctor moves it down to the area of changed cells. Energy is sent into the electrode. This heats up the esophagus lining, destroying it.  · When the procedure is done, the doctor removes the wire and tube.  Risks of RFA for Horners esophagus  These include:  · Chest pain  · Sore throat or trouble swallowing  · Narrowing of the esophagus (stricture)  · Irritation of or hole (perforation) in the esophagus  · Problems related to the anesthesia  Date Last Reviewed:  5/1/2016 © 2000-2017 [employer]. [address] [place] [address], [place] [address]. All rights reserved. This information is not intended as a substitute for professional medical care. Always follow your healthcare professional's instructions.        What Is a Hiatal Hernia?    Hiatal hernia is when the area where the stomach and esophagus meet bulges up through the diaphragm into the chest cavity. In some cases, part of the stomach may bulge above the diaphragm. Stomach acid may move up into the esophagus and cause symptoms. The symptoms are often blamed on gastroesophageal reflux disease (GERD). You may only know about the hernia when it shows up on an X-ray taken for other reasons.   What you may feel  The hiatus is a normal hole in the diaphragm. The esophagus passes through this hole and leads to the stomach. In some cases, part of the stomach may bulge above the diaphragm. This bulge is called a hernia. Stomach acid may move up into the esophagus and cause symptoms.  When you eat, the muscle at the hiatus relaxes to allow food to pass into the stomach. It tightens again to keep food and digestive acids in the stomach.  Many people with hiatal hernias have mild symptoms. You may notice the following GERD symptoms:  · Heartburn or other chest discomfort  · A feeling of chest fullness after a meal  · Frequent burping  · Acid taste in the mouth  · Trouble swallowing  Treating symptoms  If you have been diagnosed with hiatal hernia, these suggestions may help improve symptoms:  · Lose excess weight. Extra weight puts pressure on the stomach and esophagus.  · Dont lie down after eating. Sit up for at least an hour after eating. Lying down after eating can increase symptoms.  · Avoid certain foods and drinks. These include fatty foods, chocolate, coffee, mint, and other foods that cause symptoms for you.  · Dont smoke or drink alcohol. These can worsen symptoms.  · Look at your medicines. Discuss your  medicines with your healthcare provider. Many medicines can cause symptoms.  · Consider an antacid medicine. Ask your healthcare provider about over-the-counter and prescription medicines that may help.  · Ask about surgery, if needed. Surgery is a treatment choice for some people. Your healthcare provider can determine if surgery is an option for you.    Date Last Reviewed: 10/1/2016  © 9647-1575 [employer]. [address] [place] [address], [place] [address]. All rights reserved. This information is not intended as a substitute for professional medical care. Always follow your healthcare professional's instructions.

## 2019-04-16 NOTE — DISCHARGE SUMMARY
Discharge Summary/Instructions after an Endoscopic Procedure    Patient Name: Devon Berry  Patient MRN: [id number]  Patient YOB: 1945 Tuesday, April 16, 2019  Dimitri Leary MD    RESTRICTIONS:  During your procedure today, you received medications for sedation.  These medications may affect your judgment, balance and coordination.  Therefore, for 24 hours, you have the following restrictions:     - DO NOT drive a car, operate machinery, make legal/financial decisions, sign important papers or drink alcohol.      ACTIVITY:  Today: no heavy lifting, straining or running due to procedural sedation/anesthesia.  The following day: return to full activity including work.    DIET:  Eat and drink normally unless instructed otherwise.     TREATMENT FOR COMMON SIDE EFFECTS:  - Mild abdominal pain, nausea, belching, bloating or excessive gas:  rest, eat lightly and use a heating pad.  - Sore Throat: treat with throat lozenges and/or gargle with warm salt water.  - Because air was used during the procedure, expelling large amounts of air from your rectum or belching is normal.  - If a bowel prep was taken, you may not have a bowel movement for 1-3 days.  This is normal.      SYMPTOMS TO WATCH FOR AND REPORT TO YOUR PHYSICIAN:  1. Abdominal pain or bloating, other than gas cramps.  2. Chest pain.  3. Back pain.  4. Signs of infection such as: chills or fever occurring within 24 hours after the procedure.  5. Rectal bleeding, which would show as bright red, maroon, or black stools. (A tablespoon of blood from the rectum is not serious, especially if hemorrhoids are present.)  6. Vomiting.  7. Weakness or dizziness.      GO DIRECTLY TO THE NEAREST EMERGENCY ROOM IF YOU HAVE ANY OF THE FOLLOWING:     Difficulty breathing              Chills and/or fever over 101 F   Persistent vomiting and/or vomiting blood   Severe abdominal pain   Severe chest pain   Black, tarry stools   Bleeding- more than one  tablespoon   Any other symptom or condition that you feel may need urgent attention    Your doctor recommends these additional instructions:  If any biopsies were taken, your doctors clinic will contact you in 1 to 2 weeks with any results.    - Discharge patient to home (ambulatory).   - Resume previous diet.   - Repeat upper endoscopy in 12 weeks for follow-up of Horner's ablation.   - Use Prilosec (omeprazole) 40 mg PO BID.   - Use sucralfate suspension 1 gram PO BID.    For questions, problems or results please call your physician - Dimitri Leary MD at Work:  [phone].    EMERGENCY PHONE NUMBER: [phone],  LAB RESULTS: [phone]    IF A COMPLICATION OR EMERGENCY SITUATION ARISES AND YOU ARE UNABLE TO REACH YOUR PHYSICIAN - GO DIRECTLY TO THE EMERGENCY ROOM.

## 2019-04-16 NOTE — H&P
History & Physical - Short Stay  Gastroenterology      SUBJECTIVE:     Procedure: EGD    Chief Complaint/Indication for Procedure: Horner's Esophagus    History of Present Illness:  Patient is a 73 y.o. male presents with Horner's with LGD S/P RFA here for follow up.     PTA Medications   Medication Sig    amiodarone (PACERONE) 200 MG Tab Take 100 mg by mouth once daily.    ascorbic acid, vitamin C, (VITAMIN C) 500 MG tablet Take 500 mg by mouth once daily.    aspirin (ECOTRIN) 81 MG EC tablet Take 81 mg by mouth once daily.    atorvastatin (LIPITOR) 20 MG tablet Take 20 mg by mouth every evening.     cyanocobalamin, vitamin B-12, 2,500 mcg Chew Take 1 tablet by mouth once daily.    ferrous sulfate (FEOSOL) 325 mg (65 mg iron) Tab tablet Take 325 mg by mouth 2 (two) times daily.     multivitamin with minerals tablet Take 1 tablet by mouth once daily.    omeprazole (PRILOSEC) 20 MG capsule Take 2 capsules (40 mg total) by mouth 2 (two) times daily.    sucralfate (CARAFATE) 1 gram tablet 1 tablet dissolved in 10 ml of water twice a day.       Review of patient's allergies indicates:  No Known Allergies     Past Medical History:   Diagnosis Date    Horner's esophagus     CAD (coronary artery disease)     Colon cancer     Colon polyps     Diverticulosis of colon     GERD (gastroesophageal reflux disease)     Heartburn     Hemorrhoids     HTN (hypertension)     Hyperlipidemia     Pneumonia      Past Surgical History:   Procedure Laterality Date    CARDIAC SURGERY      COLONOSCOPY      CORONARY ARTERY BYPASS GRAFT  03/04/2016    3 vessels    CORONARY ARTERY BYPASS GRAFT      EGD (ESOPHAGOGASTRODUODENOSCOPY) N/A 1/17/2019    [facility]    ESOPHAGOGASTRODUODENOSCOPY      HEMORRHOID SURGERY      LAPAROSCOPIC LEFT COLON RESECTION      SIGMOIDECTOMY  2015    TONSILLECTOMY, ADENOIDECTOMY       Family History   Problem Relation Age of Onset    Heart  disease Father      Social History     Tobacco Use    Smoking status: Current Every Day Smoker     Packs/day: 1.00     Types: Cigarettes    Smokeless tobacco: Never Used   Substance Use Topics    Alcohol use: Yes     Comment: 3.6 oz/week    Drug use: No       Review of Systems:  Respiratory: no cough or shortness of breath  Cardiovascular: no chest pain or palpitations  Gastrointestinal: no nausea or vomiting, no abdominal pain or change in bowel habits    OBJECTIVE:     Vital Signs (Most Recent)       Physical Exam:  General: well developed, well nourished  Lungs:  normal respiratory effort  Heart: regular rate, S1, S2 normal  Abdomen: soft, non-tender non-distented; bowel sounds normal; no masses,  no organomegaly    Laboratory  CBC: No results for input(s): WBC, RBC, HGB, HCT, PLT, MCV, MCH, MCHC in the last 168 hours.  CMP: No results for input(s): GLU, CALCIUM, ALBUMIN, PROT, NA, K, CO2, CL, BUN, CREATININE, ALKPHOS, ALT, AST, BILITOT in the last 168 hours.  Coagulation: No results for input(s): LABPROT, INR, APTT in the last 168 hours.      Diagnostic Results:      ASSESSMENT/PLAN:     Horner's with LGD    Plan: EGD    Anesthesia Plan: MAC    ASA Grade: ASA 3 - Patient with moderate systemic disease with functional limitations     The impression and plan was discussed in detail with the patient and family. All questions have been answered and the patient voices understanding of our plan at this point. The risk of the procedure was discussed in detail which includes but not limited to bleeding, infection, perforation in some cases requiring surgery with its spectrum of complications.

## 2019-04-17 ENCOUNTER — TELEPHONE (OUTPATIENT)
Dept: ENDOSCOPY | Facility: HOSPITAL | Age: 74
End: 2019-04-17

## 2019-04-17 DIAGNOSIS — K22.719 BARRETT'S ESOPHAGUS WITH DYSPLASIA, UNSPECIFIED: Primary | ICD-10-CM

## 2019-04-23 ENCOUNTER — TELEPHONE (OUTPATIENT)
Dept: GASTROENTEROLOGY | Facility: CLINIC | Age: 74
End: 2019-04-23

## 2019-04-23 NOTE — TELEPHONE ENCOUNTER
Post procedure Instructions: Repeat upper endoscopy in 12 weeks for follow-up of Horner's ablation. Please place on recall.

## 2019-04-29 ENCOUNTER — TELEPHONE (OUTPATIENT)
Dept: GASTROENTEROLOGY | Facility: CLINIC | Age: 74
End: 2019-04-29

## 2019-07-18 ENCOUNTER — TELEPHONE (OUTPATIENT)
Dept: ENDOSCOPY | Facility: HOSPITAL | Age: 74
End: 2019-07-18

## 2019-07-18 NOTE — TELEPHONE ENCOUNTER
Spoke with patient about arrival time @ 0800    NPO status reviewed: Patient must have nothing to eat after midnight.  Pt may have CLEAR liquids ONLY until completely NPO 3 hrs @ 0500    Medications: Do not take Insulin or oral diabetic medications the day of the procedure.  Take as prescribed: heart, seizure and blood pressure medication in the morning with a sip of water (less than an ounce).  Take any breathing medications and bring inhalers to hospital with you Leave all valuables and jewelry at home.     Wear comfortable clothes to procedure to change into hospital gown You cannot drive for 24 hours after your procedure because you will receive sedation for your procedure to make you comfortable.  A ride must be provided at discharge.

## 2019-07-22 ENCOUNTER — HOSPITAL ENCOUNTER (OUTPATIENT)
Facility: HOSPITAL | Age: 74
Discharge: HOME | End: 2019-07-22
Attending: INTERNAL MEDICINE | Admitting: INTERNAL MEDICINE
Payer: MEDICARE

## 2019-07-22 ENCOUNTER — ANESTHESIA EVENT (OUTPATIENT)
Dept: ENDOSCOPY | Facility: HOSPITAL | Age: 74
End: 2019-07-22
Payer: MEDICARE

## 2019-07-22 ENCOUNTER — ANESTHESIA (OUTPATIENT)
Dept: ENDOSCOPY | Facility: HOSPITAL | Age: 74
End: 2019-07-22
Payer: MEDICARE

## 2019-07-22 VITALS
DIASTOLIC BLOOD PRESSURE: 92 MMHG | HEART RATE: 71 BPM | SYSTOLIC BLOOD PRESSURE: 140 MMHG | TEMPERATURE: 98 F | OXYGEN SATURATION: 94 % | BODY MASS INDEX: 33.86 KG/M2 | HEIGHT: 72 IN | WEIGHT: 250 LBS | RESPIRATION RATE: 20 BRPM

## 2019-07-22 DIAGNOSIS — K22.719 BARRETT'S ESOPHAGUS WITH DYSPLASIA: Primary | ICD-10-CM

## 2019-07-22 PROCEDURE — 63600175 PHARM REV CODE 636 W HCPCS: Performed by: NURSE ANESTHETIST, CERTIFIED REGISTERED

## 2019-07-22 PROCEDURE — 37000008 HC ANESTHESIA 1ST 15 MINUTES: Performed by: INTERNAL MEDICINE

## 2019-07-22 PROCEDURE — 43270 EGD LESION ABLATION: CPT | Performed by: INTERNAL MEDICINE

## 2019-07-22 PROCEDURE — 43270 PR EGD, FLEX, W/ABLATION, TUMOR/POLYP/LESION(S), W/ DILATION: ICD-10-PCS | Mod: ,,, | Performed by: INTERNAL MEDICINE

## 2019-07-22 PROCEDURE — C1886 CATHETER, ABLATION: HCPCS | Performed by: INTERNAL MEDICINE

## 2019-07-22 PROCEDURE — 25000003 PHARM REV CODE 250: Performed by: NURSE ANESTHETIST, CERTIFIED REGISTERED

## 2019-07-22 PROCEDURE — 25000003 PHARM REV CODE 250: Performed by: INTERNAL MEDICINE

## 2019-07-22 PROCEDURE — 37000009 HC ANESTHESIA EA ADD 15 MINS: Performed by: INTERNAL MEDICINE

## 2019-07-22 PROCEDURE — 43270 EGD LESION ABLATION: CPT | Mod: ,,, | Performed by: INTERNAL MEDICINE

## 2019-07-22 RX ORDER — PROPOFOL 10 MG/ML
VIAL (ML) INTRAVENOUS
Status: DISCONTINUED | OUTPATIENT
Start: 2019-07-22 | End: 2019-07-22

## 2019-07-22 RX ORDER — LIDOCAINE HCL/PF 100 MG/5ML
SYRINGE (ML) INTRAVENOUS
Status: DISCONTINUED | OUTPATIENT
Start: 2019-07-22 | End: 2019-07-22

## 2019-07-22 RX ORDER — ETOMIDATE 2 MG/ML
INJECTION INTRAVENOUS
Status: DISCONTINUED | OUTPATIENT
Start: 2019-07-22 | End: 2019-07-22

## 2019-07-22 RX ORDER — GLYCOPYRROLATE 0.2 MG/ML
INJECTION INTRAMUSCULAR; INTRAVENOUS
Status: DISCONTINUED | OUTPATIENT
Start: 2019-07-22 | End: 2019-07-22

## 2019-07-22 RX ORDER — SODIUM CHLORIDE 0.9 % (FLUSH) 0.9 %
10 SYRINGE (ML) INJECTION
Status: DISCONTINUED | OUTPATIENT
Start: 2019-07-22 | End: 2019-07-22 | Stop reason: HOSPADM

## 2019-07-22 RX ORDER — PROPOFOL 10 MG/ML
VIAL (ML) INTRAVENOUS CONTINUOUS PRN
Status: DISCONTINUED | OUTPATIENT
Start: 2019-07-22 | End: 2019-07-22

## 2019-07-22 RX ORDER — SODIUM CHLORIDE 9 MG/ML
INJECTION, SOLUTION INTRAVENOUS CONTINUOUS
Status: DISCONTINUED | OUTPATIENT
Start: 2019-07-22 | End: 2019-07-22 | Stop reason: HOSPADM

## 2019-07-22 RX ADMIN — GLYCOPYRROLATE 0.2 MG: 0.2 INJECTION, SOLUTION INTRAMUSCULAR; INTRAVENOUS at 08:07

## 2019-07-22 RX ADMIN — ETOMIDATE 6 MG: 2 INJECTION, SOLUTION INTRAVENOUS at 09:07

## 2019-07-22 RX ADMIN — SODIUM CHLORIDE: 0.9 INJECTION, SOLUTION INTRAVENOUS at 08:07

## 2019-07-22 RX ADMIN — LIDOCAINE HYDROCHLORIDE 100 MG: 20 INJECTION, SOLUTION INTRAVENOUS at 09:07

## 2019-07-22 RX ADMIN — TOPICAL ANESTHETIC 1 EACH: 200 SPRAY DENTAL; PERIODONTAL at 08:07

## 2019-07-22 RX ADMIN — PROPOFOL 40 MG: 10 INJECTION, EMULSION INTRAVENOUS at 09:07

## 2019-07-22 RX ADMIN — PROPOFOL 150 MCG/KG/MIN: 10 INJECTION, EMULSION INTRAVENOUS at 09:07

## 2019-07-22 RX ADMIN — ETOMIDATE 4 MG: 2 INJECTION, SOLUTION INTRAVENOUS at 09:07

## 2019-07-22 NOTE — TRANSFER OF CARE
Anesthesia Transfer of Care Note    Patient: Devon Berry    Procedure(s) Performed: Procedure(s) (LRB):  EGD (ESOPHAGOGASTRODUODENOSCOPY) (N/A)    Patient location: GI    Anesthesia Type: MAC    Transport from OR: Transported from OR on room air with adequate spontaneous ventilation    Post pain: adequate analgesia    Post assessment: no apparent anesthetic complications and tolerated procedure well    Post vital signs: stable    Level of consciousness: responds to stimulation and sedated    Nausea/Vomiting: no nausea/vomiting    Complications: none    Transfer of care protocol was followed      Last vitals:   Visit Vitals  /87 (BP Location: Left arm, Patient Position: Lying)   Pulse 60   Temp 36.7 °C (98.1 °F) (Temporal)   Resp 16   Ht 6' (1.829 m)   Wt 113.4 kg (250 lb)   SpO2 96%   BMI 33.91 kg/m²

## 2019-07-22 NOTE — ANESTHESIA POSTPROCEDURE EVALUATION
Anesthesia Post Evaluation    Patient: Devon Berry    Procedure(s) Performed: Procedure(s) (LRB):  EGD (ESOPHAGOGASTRODUODENOSCOPY) (N/A)    Final Anesthesia Type: MAC  Patient location during evaluation: GI PACU  Patient participation: Yes- Able to Participate  Level of consciousness: awake and alert  Post-procedure vital signs: reviewed and stable  Pain management: adequate  Airway patency: patent  PONV status at discharge: No PONV  Anesthetic complications: no      Cardiovascular status: hemodynamically stable  Respiratory status: unassisted, spontaneous ventilation and room air  Hydration status: euvolemic  Follow-up not needed.          Vitals Value Taken Time   /87 7/22/2019  8:19 AM   Temp 36.7 °C (98.1 °F) 7/22/2019  8:19 AM   Pulse 60 7/22/2019  8:19 AM   Resp 16 7/22/2019  8:19 AM   SpO2 96 % 7/22/2019  8:19 AM         No case tracking events are documented in the log.      Pain/Angel Score: No data recorded

## 2019-07-22 NOTE — PLAN OF CARE
Dr. Leary visited at bedside, discussed findings and recommendations with patient and family member; all questions asked and answered. Verbalized understanding of information given. Handout provided at time of discharge.

## 2019-07-22 NOTE — PLAN OF CARE
Admission process complete. Patient ready for procedure. Plan of care reviewed with patient and his spouse. Preoperative fasting appropriate for procedure and sedation. Call light in reach and bed in lowest position.

## 2019-07-22 NOTE — ANESTHESIA PREPROCEDURE EVALUATION
07/22/2019  Devon Berry is a 73 y.o., male with h/o CAD/CABG, afib (on amiodarone), HTN, multiple GI issues, who how presents for repeat EGD.  Of note, EGD in 1/2019 was uncomplicated. EF 30%    Past Medical History:   Diagnosis Date    Horner's esophagus     CAD (coronary artery disease)     Colon cancer     Colon polyps     Diverticulosis of colon     GERD (gastroesophageal reflux disease)     Heartburn     Hemorrhoids     HTN (hypertension)     Hyperlipidemia     Pneumonia    smoker      Past Surgical History:   Procedure Laterality Date    CARDIAC SURGERY      COLONOSCOPY      CORONARY ARTERY BYPASS GRAFT  03/04/2016    3 vessels    CORONARY ARTERY BYPASS GRAFT      EGD (ESOPHAGOGASTRODUODENOSCOPY) N/A 1/17/2019    Performed by [facility] at [facility] ([facility])    EGD (ESOPHAGOGASTRODUODENOSCOPY), Horner's N/A 4/16/2019    Performed by [facility] at [facility]    ESOPHAGOGASTRODUODENOSCOPY      HEMORRHOID SURGERY      LAPAROSCOPIC LEFT COLON RESECTION      SIGMOIDECTOMY  2015    TONSILLECTOMY, ADENOIDECTOMY           Pre-op Assessment    I have reviewed the Patient Summary Reports.     I have reviewed the Nursing Notes.   I have reviewed the Medications.     Review of Systems  Anesthesia Hx:   Denies Personal Hx of Anesthesia complications.   Social:  Smoker, No Alcohol Use    Hematology/Oncology:        Oncology Comments: Colon cancer   Cardiovascular:   Hypertension Past MI CAD  CABG/stent Dysrhythmias atrial fibrillation hyperlipidemia EF-30%   Hepatic/GI:   GERD Horner's esophagus,  Diverticulosis of colon,  Colon cancer       Physical Exam  General:  Well nourished    Airway/Jaw/Neck:  Airway Findings: Mouth Opening: Normal Tongue: Normal  General Airway Assessment: Adult  Mallampati: III  TM Distance: Normal, at least 6 cm       Dental:  Dental Findings: In tact   Chest/Lungs:  Chest/Lungs Clear    Heart/Vascular:  Heart Findings: Normal       Mental Status:  Mental Status Findings:  Alert and Oriented, Cooperative         Anesthesia Plan  Type of Anesthesia, risks & benefits discussed:  Anesthesia Type:  general, MAC  Patient's Preference:   Intra-op Monitoring Plan: standard ASA monitors  Intra-op Monitoring Plan Comments:   Post Op Pain Control Plan:   Post Op Pain Control Plan Comments:   Induction:   IV  Beta Blocker:  Patient is not currently on a Beta-Blocker (No further documentation required).       Informed Consent: Patient understands risks and agrees with Anesthesia plan.  Questions answered. Anesthesia consent signed with patient.  ASA Score: 4     Day of Surgery Review of History & Physical: I have interviewed and examined the patient. I have reviewed the patient's H&P dated:    H&P update referred to the provider.         Ready For Surgery From Anesthesia Perspective.

## 2019-07-22 NOTE — DISCHARGE INSTRUCTIONS
Understanding Radiofrequency Ablation (RFA) for Horners Esophagus    The esophagus is the tube that joins the mouth to the stomach. In people with GERD (gastroesophageal reflux disease), stomach acid flows back into the esophagus. Over time, stomach acid can hurt the esophagus. Horners esophagus is a condition where cells in the esophagus lining change to a different type of cell. The changed cells may protect the esophagus from stomach acid. But these cells are more likely than normal lining cells to become cancerous.  Radiofrequency ablation (RFA) is a procedure that destroys the changed cells. This can help prevent or treat cancer of the esophagus.  Why RFA for Horners esophagus is done  This procedure removes the changed lining from the esophagus. It is most often advised for people who show precancerous changes in the lining and very early cancer. RFA destroys the abnormal lining. Normal lining then grows back to replace it.  How RFA for Horners esophagus is done  RFA is most often done on an outpatient basis. That means you go home the same day. During the procedure:  · You are given medicine (anesthesia) to prevent pain and make you sleep during the procedure.  · The doctor puts a thin, lighted tube (endoscope) into your mouth. He or she passes the tube down your throat into the esophagus. The tube sends live video from inside the esophagus to a computer screen.  · A wire with a tiny electrode attached is put through the tube. The doctor moves it down to the area of changed cells. Energy is sent into the electrode. This heats up the esophagus lining, destroying it.  · When the procedure is done, the doctor removes the wire and tube.  Risks of RFA for Horners esophagus  These include:  · Chest pain  · Sore throat or trouble swallowing  · Narrowing of the esophagus (stricture)  · Irritation of or hole (perforation) in the esophagus  · Problems related to the anesthesia  Date Last Reviewed:  5/1/2016  © 1612-3993 [employer]. [address] [place] [address]. All rights reserved. This information is not intended as a substitute for professional medical care. Always follow your healthcare professional's instructions.        Tips to Control Acid Reflux    To control acid reflux, youll need to make some basic diet and lifestyle changes. The simple steps outlined below may be all youll need to ease discomfort.  Watch what you eat  · Avoid fatty foods and spicy foods.  · Eat fewer acidic foods, such as citrus and tomato-based foods. These can increase symptoms.  · Limit drinking alcohol, caffeine, and fizzy beverages. All increase acid reflux.  · Try limiting chocolate, peppermint, and spearmint. These can worsen acid reflux in some people.  Watch when you eat  · Avoid lying down for 3 hours after eating.  · Do not snack before going to bed.  Raise your head  Raising your head and upper body by 4 to 6 inches helps limit reflux when youre lying down. Put blocks under the head of your bed frame to raise it.  Other changes  · Lose weight, if you need to  · Dont exercise near bedtime  · Avoid tight-fitting clothes  · Limit aspirin and ibuprofen  · Stop smoking   Date Last Reviewed: 7/1/2016  © 6571-3979 [employer]. [address] [place] [address]. All rights reserved. This information is not intended as a substitute for professional medical care. Always follow your healthcare professional's instructions.        What Is a Hiatal Hernia?    Hiatal hernia is when the area where the stomach and esophagus meet bulges up through the diaphragm into the chest cavity. In some cases, part of the stomach may bulge above the diaphragm. Stomach acid may move up into the esophagus and cause symptoms. The symptoms are often blamed on gastroesophageal reflux disease (GERD). You may only know about the hernia when it shows up on an X-ray taken for other reasons.   What you may  feel  The hiatus is a normal hole in the diaphragm. The esophagus passes through this hole and leads to the stomach. In some cases, part of the stomach may bulge above the diaphragm. This bulge is called a hernia. Stomach acid may move up into the esophagus and cause symptoms.  When you eat, the muscle at the hiatus relaxes to allow food to pass into the stomach. It tightens again to keep food and digestive acids in the stomach.  Many people with hiatal hernias have mild symptoms. You may notice the following GERD symptoms:  · Heartburn or other chest discomfort  · A feeling of chest fullness after a meal  · Frequent burping  · Acid taste in the mouth  · Trouble swallowing  Treating symptoms  If you have been diagnosed with hiatal hernia, these suggestions may help improve symptoms:  · Lose excess weight. Extra weight puts pressure on the stomach and esophagus.  · Dont lie down after eating. Sit up for at least an hour after eating. Lying down after eating can increase symptoms.  · Avoid certain foods and drinks. These include fatty foods, chocolate, coffee, mint, and other foods that cause symptoms for you.  · Dont smoke or drink alcohol. These can worsen symptoms.  · Look at your medicines. Discuss your medicines with your healthcare provider. Many medicines can cause symptoms.  · Consider an antacid medicine. Ask your healthcare provider about over-the-counter and prescription medicines that may help.  · Ask about surgery, if needed. Surgery is a treatment choice for some people. Your healthcare provider can determine if surgery is an option for you.    Date Last Reviewed: 10/1/2016  © 8220-5651 "[employer]. [address] [place] [address], [place] [address]. All rights reserved. This information is not intended as a substitute for professional medical care. Always follow your healthcare professional's instructions.

## 2019-07-22 NOTE — H&P
History & Physical - Short Stay  Gastroenterology      SUBJECTIVE:     Procedure: EGD    Chief Complaint/Indication for Procedure: Horner's Esophagus    History of Present Illness:  Patient is a 73 y.o. male presents with Horner's with LGD S/P RFA here for follow up.     PTA Medications   Medication Sig    amiodarone (PACERONE) 200 MG Tab Take 100 mg by mouth once daily.    ascorbic acid, vitamin C, (VITAMIN C) 500 MG tablet Take 500 mg by mouth once daily.    aspirin (ECOTRIN) 81 MG EC tablet Take 81 mg by mouth once daily.    atorvastatin (LIPITOR) 20 MG tablet Take 20 mg by mouth every evening.     cyanocobalamin, vitamin B-12, 2,500 mcg Chew Take 1 tablet by mouth once daily.    ferrous sulfate (FEOSOL) 325 mg (65 mg iron) Tab tablet Take 325 mg by mouth 2 (two) times daily.     multivitamin with minerals tablet Take 1 tablet by mouth once daily.    omeprazole (PRILOSEC) 20 MG capsule Take 2 capsules (40 mg total) by mouth 2 (two) times daily.    sucralfate (CARAFATE) 1 gram tablet 1 tablet dissolved in 10 ml of water twice a day.       Review of patient's allergies indicates:  No Known Allergies     Past Medical History:   Diagnosis Date    Horner's esophagus     CAD (coronary artery disease)     Colon cancer     Colon polyps     Diverticulosis of colon     GERD (gastroesophageal reflux disease)     Heartburn     Hemorrhoids     HTN (hypertension)     Hyperlipidemia     Pneumonia      Past Surgical History:   Procedure Laterality Date    CARDIAC SURGERY      COLONOSCOPY      CORONARY ARTERY BYPASS GRAFT  03/04/2016    3 vessels    CORONARY ARTERY BYPASS GRAFT      EGD (ESOPHAGOGASTRODUODENOSCOPY) N/A 1/17/2019    Performed [facility] at [facility] (2ND FLR)    EGD (ESOPHAGOGASTRODUODENOSCOPY), Horner's N/A 4/16/2019    [facility]O    ESOPHAGOGASTRODUODENOSCOPY      HEMORRHOID SURGERY      LAPAROSCOPIC LEFT COLON RESECTION       SIGMOIDECTOMY  2015    TONSILLECTOMY, ADENOIDECTOMY       Family History   Problem Relation Age of Onset    Heart disease Father      Social History     Tobacco Use    Smoking status: Current Every Day Smoker     Packs/day: 1.00     Types: Cigarettes    Smokeless tobacco: Never Used   Substance Use Topics    Alcohol use: Yes     Comment: 3.6 oz/week    Drug use: No       Review of Systems:  Respiratory: no cough or shortness of breath  Cardiovascular: no chest pain or palpitations  Gastrointestinal: negative for reflux symptoms    OBJECTIVE:     Vital Signs (Most Recent)       Physical Exam:  General: well developed, well nourished  Lungs:  normal respiratory effort  Heart: regular rate, S1, S2 normal    Laboratory  CBC: No results for input(s): WBC, RBC, HGB, HCT, PLT, MCV, MCH, MCHC in the last 168 hours.  CMP: No results for input(s): GLU, CALCIUM, ALBUMIN, PROT, NA, K, CO2, CL, BUN, CREATININE, ALKPHOS, ALT, AST, BILITOT in the last 168 hours.  Coagulation: No results for input(s): LABPROT, INR, APTT in the last 168 hours.      Diagnostic Results:      ASSESSMENT/PLAN:     Horner's with LGD    Plan: EGD    Anesthesia Plan: MAC    ASA Grade: ASA 3 - Patient with moderate systemic disease with functional limitations     The impression and plan was discussed in detail with the patient and family. All questions have been answered and the patient voices understanding of our plan at this point. The risk of the procedure was discussed in detail which includes but not limited to bleeding, infection, perforation in some cases requiring surgery with its spectrum of complications.

## 2019-07-22 NOTE — [employer] PATIENT INSTRUCTIONS
Discharge Summary/Instructions after an Endoscopic Procedure  Patient Name: Devon Berry  Patient MRN: [id number]  Patient YOB: 1945 Monday, July 22, 2019  Dimitri Leary MD  RESTRICTIONS:  During your procedure today, you received medications for sedation.  These   medications may affect your judgment, balance and coordination.  Therefore,   for 24 hours, you have the following restrictions:   - DO NOT drive a car, operate machinery, make legal/financial decisions,   sign important papers or drink alcohol.    ACTIVITY:  Today: no heavy lifting, straining or running due to procedural   sedation/anesthesia.  The following day: return to full activity including work.  DIET:  Eat and drink normally unless instructed otherwise.     TREATMENT FOR COMMON SIDE EFFECTS:  - Mild abdominal pain, nausea, belching, bloating or excessive gas:  rest,   eat lightly and use a heating pad.  - Sore Throat: treat with throat lozenges and/or gargle with warm salt   water.  - Because air was used during the procedure, expelling large amounts of air   from your rectum or belching is normal.  - If a bowel prep was taken, you may not have a bowel movement for 1-3 days.    This is normal.  SYMPTOMS TO WATCH FOR AND REPORT TO YOUR PHYSICIAN:  1. Abdominal pain or bloating, other than gas cramps.  2. Chest pain.  3. Back pain.  4. Signs of infection such as: chills or fever occurring within 24 hours   after the procedure.  5. Rectal bleeding, which would show as bright red, maroon, or black stools.   (A tablespoon of blood from the rectum is not serious, especially if   hemorrhoids are present.)  6. Vomiting.  7. Weakness or dizziness.  GO DIRECTLY TO THE NEAREST EMERGENCY ROOM IF YOU HAVE ANY OF THE FOLLOWING:      Difficulty breathing              Chills and/or fever over 101 F   Persistent vomiting and/or vomiting blood   Severe abdominal pain   Severe chest pain   Black, tarry stools   Bleeding- more than one  tablespoon   Any other symptom or condition that you feel may need urgent attention  Your doctor recommends these additional instructions:  If any biopsies were taken, your doctors clinic will contact you in 1 to 2   weeks with any results.  - Discharge patient to home (ambulatory).   - Resume previous diet.   - Use Prilosec (omeprazole) 40 mg PO BID.   - Repeat upper endoscopy in 4 months for follow-up of Horner's ablation.  For questions, problems or results please call your physician - Dimitri Leary MD at Work:  [phone].  EMERGENCY PHONE NUMBER: [phone],  LAB RESULTS: [phone]  IF A COMPLICATION OR EMERGENCY SITUATION ARISES AND YOU ARE UNABLE TO REACH   YOUR PHYSICIAN - GO DIRECTLY TO THE EMERGENCY ROOM.  Dimitri Leary MD  7/22/2019 9:29:18 AM  This report has been verified and signed electronically.  [employer]

## 2019-07-30 ENCOUNTER — TELEPHONE (OUTPATIENT)
Dept: GASTROENTEROLOGY | Facility: CLINIC | Age: 74
End: 2019-07-30

## 2019-07-30 NOTE — TELEPHONE ENCOUNTER
Post procedure Instructions: Repeat upper endoscopy in 4 months for follow-up of Horner's ablation. Recall placed.

## 2019-08-07 ENCOUNTER — TELEPHONE (OUTPATIENT)
Dept: ENDOSCOPY | Facility: HOSPITAL | Age: 74
End: 2019-08-07

## 2019-08-07 DIAGNOSIS — K22.719 BARRETT'S ESOPHAGUS WITH DYSPLASIA, UNSPECIFIED: Primary | ICD-10-CM

## 2019-10-04 ENCOUNTER — TELEPHONE (OUTPATIENT)
Dept: GASTROENTEROLOGY | Facility: CLINIC | Age: 74
End: 2019-10-04

## 2019-10-04 NOTE — LETTER
[facility] - Gastroenterology  [address] [address]  Phone: [phone]         EGD Prep Instructions    [facility]  [address]  Clinic Office [phone]  Endoscopy Lab [phone]    You are scheduled for an EGD with Dr. Leary on Friday 11/8/2019  at [facility].  You will check in at Admit on the first floor of the hospital. Please contact the office two days before procedure date to reschedule if needed.    You may not have anything to eat after 7pm and nothing to drink after midnight.  You can drink clear liquids between 7pm and midnight.    You MAY take your blood pressure, heart, and seizure medication on the morning of the procedure, with a SIP of water.  Hold ALL other medications until after the procedure.    If you are on blood thinners THAT YOU HAVE BEEN INSTRUCTED TO HOLD BY YOUR DOCTOR FOR THIS PROCEDURE, then do NOT take this the morning of your EGD.  Do NOT stop these medications on your own, they must be approved to be held by your doctor.  Your EGD can NOT be done if you are on these medications.  Examples of blood thinners include: Coumadin, Aggrenox, Plavix, Pradaxa, Reapro, Pletal, Xarelto, Ticagrelor, Brilinta, Eliquis, and high dose aspirin (325 mg).  You do not have to stop baby aspirin 81 mg.

## 2019-10-04 NOTE — LETTER
[facility] - Gastroenterology  [address], [facility] [address]  [place] [address]  Phone: [phone]     October 4,2019    Devon Berry  [address] [address] [facility] [address]          EGD Prep Instructions    [facility]  [address] [facility] [address] [facility]  Clinic Office [phone]  Endoscopy Lab [phone]    You are scheduled for an EGD with Dr. Leary 8:00 am Friday 11/08/2019  at [facility].  You will check in at Admit on the first floor of the hospital. Please contact the office two days before procedure date to reschedule if needed.    You may not have anything to eat after 7pm and nothing to drink after midnight.  You can drink clear liquids between 7pm and midnight.    You MAY take your blood pressure, heart, and seizure medication on the morning of the procedure, with a SIP of water.  Hold ALL other medications until after the procedure.    If you are on blood thinners THAT YOU HAVE BEEN INSTRUCTED TO HOLD BY YOUR DOCTOR FOR THIS PROCEDURE, then do NOT take this the morning of your EGD.  Do NOT stop these medications on your own, they must be approved to be held by your doctor.  Your EGD can NOT be done if you are on these medications.  Examples of blood thinners include: Coumadin, Aggrenox, Plavix, Pradaxa, Reapro, Pletal, Xarelto, Ticagrelor, Brilinta, Eliquis, and high dose aspirin (325 mg).  You do not have to stop baby aspirin 81 mg.

## 2019-10-04 NOTE — TELEPHONE ENCOUNTER
EGD Prep Instructions  Reviewed with Patient/ Wife   Verbalized understanding.    [facility] [address] Clinic Office [phone]  Endoscopy Lab [phone]    You are scheduled for an EGD with  on    8: 00 Am Friday November 8, 2019  at [facility].  You will check in at Admit on the first floor of the hospital. Please contact the office two days before procedure date to reschedule if needed.    You may not have anything to eat after 7pm and nothing to drink after midnight.  You can drink clear liquids between 7pm and midnight.    You MAY take your blood pressure, heart, and seizure medication on the morning of the procedure, with a SIP of water.  Hold ALL other medications until after the procedure.    If you are on blood thinners THAT YOU HAVE BEEN INSTRUCTED TO HOLD BY YOUR DOCTOR FOR THIS PROCEDURE, then do NOT take this the morning of your EGD.  Do NOT stop these medications on your own, they must be approved to be held by your doctor.  Your EGD can NOT be done if you are on these medications.  Examples of blood thinners include: Coumadin, Aggrenox, Plavix, Pradaxa, Reapro, Pletal, Xarelto, Ticagrelor, Brilinta, Eliquis, and high dose aspirin (325 mg).  You do not have to stop baby aspirin 81 mg.

## 2019-10-30 ENCOUNTER — TELEPHONE (OUTPATIENT)
Dept: GASTROENTEROLOGY | Facility: CLINIC | Age: 74
End: 2019-10-30

## 2019-10-30 NOTE — TELEPHONE ENCOUNTER
----- Message from Lisette Drake sent at 10/30/2019 12:14 PM CDT -----  Contact: pt  Wife calling [phone]  Pt needs medication refilled.  He I almost out.  ( omeprazole (PRILOSEC) 20 MG capsule)  Please call pt.      [facility] [phone]

## 2019-10-30 NOTE — TELEPHONE ENCOUNTER
----- Message from Marilyn Villarreal sent at 10/30/2019 12:25 PM CDT -----  Contact: patient wife  [phone]-please call above patient wife at number in message,returning a call to the nurse about medication waiting on a call back thanks.

## 2019-10-30 NOTE — TELEPHONE ENCOUNTER
----- Message from Zuly Lemus sent at 10/30/2019  1:47 PM CDT -----  Contact: pts wife at [phone]  [facility] is returning your call fr  this afternoon.  Missed your call twice.  Please try again.

## 2019-10-31 ENCOUNTER — TELEPHONE (OUTPATIENT)
Dept: ENDOSCOPY | Facility: HOSPITAL | Age: 74
End: 2019-10-31

## 2019-10-31 RX ORDER — OMEPRAZOLE 20 MG/1
40 CAPSULE, DELAYED RELEASE ORAL 2 TIMES DAILY
Qty: 120 CAPSULE | Refills: 2 | Status: ON HOLD | OUTPATIENT
Start: 2019-10-31 | End: 2019-11-08 | Stop reason: SDUPTHER

## 2019-10-31 NOTE — TELEPHONE ENCOUNTER
----- Message from Edouard Aguilar sent at 10/31/2019  9:36 AM CDT -----  Contact: Damari (wife): [phone]  Rx Refill/Request     Is this a Refill or New Rx: Refill     Rx Name and Strength:  omeprazole (PRILOSEC) 20 MG     Preferred Pharmacy with phone number: [facility] [address] [phone] (Phone)  [phone] (Fax)        Communication Preference: Damari (wife): [phone]    Additional Information: Pt's wife state the pt is almost out of medication

## 2019-11-06 ENCOUNTER — TELEPHONE (OUTPATIENT)
Dept: ENDOSCOPY | Facility: HOSPITAL | Age: 74
End: 2019-11-06

## 2019-11-06 NOTE — TELEPHONE ENCOUNTER
Left message instructing patient to call dept @ [phone] between 8am-4pm.    Arrival time to be given @ 0800  Patient does not have a portal

## 2019-11-06 NOTE — TELEPHONE ENCOUNTER
Spoke with patient about arrival time @ 0800.     NPO status reviewed: Patient must have nothing to eat after midnight.      Medications: Do not take Insulin or oral diabetic medications the day of the procedure.  Take as prescribed: heart, seizure and blood pressure medication in the morning with a sip of water (less than an ounce).  Take any breathing medications and bring inhalers to hospital with you Leave all valuables and jewelry at home.     Wear comfortable clothes to procedure to change into hospital gown You cannot drive for 24 hours after your procedure because you will receive sedation for your procedure to make you comfortable.  A ride must be provided at discharge.

## 2019-11-08 ENCOUNTER — TELEPHONE (OUTPATIENT)
Dept: ENDOSCOPY | Facility: HOSPITAL | Age: 74
End: 2019-11-08

## 2019-11-08 ENCOUNTER — ANESTHESIA EVENT (OUTPATIENT)
Dept: ENDOSCOPY | Facility: HOSPITAL | Age: 74
End: 2019-11-08
Payer: MEDICARE

## 2019-11-08 ENCOUNTER — ANESTHESIA (OUTPATIENT)
Dept: ENDOSCOPY | Facility: HOSPITAL | Age: 74
End: 2019-11-08
Payer: MEDICARE

## 2019-11-08 ENCOUNTER — HOSPITAL ENCOUNTER (OUTPATIENT)
Facility: HOSPITAL | Age: 74
Discharge: HOME | End: 2019-11-08
Attending: INTERNAL MEDICINE | Admitting: INTERNAL MEDICINE
Payer: MEDICARE

## 2019-11-08 VITALS
OXYGEN SATURATION: 98 % | HEART RATE: 82 BPM | SYSTOLIC BLOOD PRESSURE: 127 MMHG | TEMPERATURE: 97 F | DIASTOLIC BLOOD PRESSURE: 83 MMHG | BODY MASS INDEX: 33.86 KG/M2 | HEIGHT: 72 IN | WEIGHT: 250 LBS | RESPIRATION RATE: 18 BRPM

## 2019-11-08 DIAGNOSIS — K22.719 BARRETT'S ESOPHAGUS WITH DYSPLASIA: Primary | ICD-10-CM

## 2019-11-08 PROCEDURE — 43270 EGD LESION ABLATION: CPT | Performed by: INTERNAL MEDICINE

## 2019-11-08 PROCEDURE — C1886 CATHETER, ABLATION: HCPCS | Performed by: INTERNAL MEDICINE

## 2019-11-08 PROCEDURE — 25000003 PHARM REV CODE 250: Performed by: NURSE ANESTHETIST, CERTIFIED REGISTERED

## 2019-11-08 PROCEDURE — 37000009 HC ANESTHESIA EA ADD 15 MINS: Performed by: INTERNAL MEDICINE

## 2019-11-08 PROCEDURE — 43270 EGD LESION ABLATION: CPT | Mod: ,,, | Performed by: INTERNAL MEDICINE

## 2019-11-08 PROCEDURE — 43270 PR EGD, FLEX, W/ABLATION, TUMOR/POLYP/LESION(S), W/ DILATION: ICD-10-PCS | Mod: ,,, | Performed by: INTERNAL MEDICINE

## 2019-11-08 PROCEDURE — 63600175 PHARM REV CODE 636 W HCPCS: Performed by: NURSE ANESTHETIST, CERTIFIED REGISTERED

## 2019-11-08 PROCEDURE — 37000008 HC ANESTHESIA 1ST 15 MINUTES: Performed by: INTERNAL MEDICINE

## 2019-11-08 PROCEDURE — 63600175 PHARM REV CODE 636 W HCPCS: Performed by: INTERNAL MEDICINE

## 2019-11-08 RX ORDER — SODIUM CHLORIDE 0.9 % (FLUSH) 0.9 %
10 SYRINGE (ML) INJECTION
Status: DISCONTINUED | OUTPATIENT
Start: 2019-11-08 | End: 2019-11-08 | Stop reason: HOSPADM

## 2019-11-08 RX ORDER — PROPOFOL 10 MG/ML
VIAL (ML) INTRAVENOUS
Status: DISCONTINUED | OUTPATIENT
Start: 2019-11-08 | End: 2019-11-08

## 2019-11-08 RX ORDER — SUCRALFATE 1 G/1
TABLET ORAL
Qty: 60 TABLET | Refills: 0 | Status: ON HOLD | OUTPATIENT
Start: 2019-11-08 | End: 2020-03-09 | Stop reason: SDUPTHER

## 2019-11-08 RX ORDER — SODIUM CHLORIDE 9 MG/ML
INJECTION, SOLUTION INTRAVENOUS CONTINUOUS
Status: DISCONTINUED | OUTPATIENT
Start: 2019-11-08 | End: 2019-11-08 | Stop reason: HOSPADM

## 2019-11-08 RX ORDER — PROPOFOL 10 MG/ML
VIAL (ML) INTRAVENOUS CONTINUOUS PRN
Status: DISCONTINUED | OUTPATIENT
Start: 2019-11-08 | End: 2019-11-08

## 2019-11-08 RX ORDER — ETOMIDATE 2 MG/ML
INJECTION INTRAVENOUS
Status: DISCONTINUED | OUTPATIENT
Start: 2019-11-08 | End: 2019-11-08

## 2019-11-08 RX ORDER — LIDOCAINE HCL/PF 100 MG/5ML
SYRINGE (ML) INTRAVENOUS
Status: DISCONTINUED | OUTPATIENT
Start: 2019-11-08 | End: 2019-11-08

## 2019-11-08 RX ORDER — OMEPRAZOLE 20 MG/1
40 CAPSULE, DELAYED RELEASE ORAL 2 TIMES DAILY
Qty: 120 CAPSULE | Refills: 2 | Status: ON HOLD | OUTPATIENT
Start: 2019-11-08 | End: 2020-03-09 | Stop reason: SDUPTHER

## 2019-11-08 RX ORDER — SODIUM CHLORIDE 9 MG/ML
INJECTION, SOLUTION INTRAVENOUS CONTINUOUS PRN
Status: DISCONTINUED | OUTPATIENT
Start: 2019-11-08 | End: 2019-11-08

## 2019-11-08 RX ADMIN — TOPICAL ANESTHETIC 1 EACH: 200 SPRAY DENTAL; PERIODONTAL at 08:11

## 2019-11-08 RX ADMIN — SODIUM CHLORIDE: 0.9 INJECTION, SOLUTION INTRAVENOUS at 08:11

## 2019-11-08 RX ADMIN — SODIUM CHLORIDE: 9 INJECTION, SOLUTION INTRAVENOUS at 08:11

## 2019-11-08 RX ADMIN — ETOMIDATE 10 MG: 2 INJECTION, SOLUTION INTRAVENOUS at 08:11

## 2019-11-08 RX ADMIN — LIDOCAINE HYDROCHLORIDE 80 MG: 20 INJECTION, SOLUTION INTRAVENOUS at 08:11

## 2019-11-08 RX ADMIN — PROPOFOL 20 MG: 10 INJECTION, EMULSION INTRAVENOUS at 08:11

## 2019-11-08 RX ADMIN — PROPOFOL 50 MCG/KG/MIN: 10 INJECTION, EMULSION INTRAVENOUS at 08:11

## 2019-11-08 NOTE — H&P
History & Physical - Short Stay  Gastroenterology      SUBJECTIVE:     Procedure: EGD    Chief Complaint/Indication for Procedure: Horner's Esophagus    History of Present Illness:  Patient is a 73 y.o. male presents with Horner's with LGD S/P RFA here for follow up.     PTA Medications   Medication Sig    amiodarone (PACERONE) 200 MG Tab Take 100 mg by mouth once daily.    ascorbic acid, vitamin C, (VITAMIN C) 500 MG tablet Take 500 mg by mouth once daily.    aspirin (ECOTRIN) 81 MG EC tablet Take 81 mg by mouth once daily.    atorvastatin (LIPITOR) 20 MG tablet Take 20 mg by mouth every evening.     cyanocobalamin, vitamin B-12, 2,500 mcg Chew Take 1 tablet by mouth once daily.    ferrous sulfate (FEOSOL) 325 mg (65 mg iron) Tab tablet Take 325 mg by mouth 2 (two) times daily.     multivitamin with minerals tablet Take 1 tablet by mouth once daily.    omeprazole (PRILOSEC) 20 MG capsule Take 2 capsules (40 mg total) by mouth 2 (two) times daily.       Review of patient's allergies indicates:  No Known Allergies     Past Medical History:   Diagnosis Date    Horner's esophagus     CAD (coronary artery disease)     Colon cancer     Colon polyps     Diverticulosis of colon     GERD (gastroesophageal reflux disease)     Heartburn     Hemorrhoids     HTN (hypertension)     Hyperlipidemia     Pneumonia      Past Surgical History:   Procedure Laterality Date    CARDIAC SURGERY      COLONOSCOPY      CORONARY ARTERY BYPASS GRAFT  03/04/2016    3 vessels    CORONARY ARTERY BYPASS GRAFT      ESOPHAGOGASTRODUODENOSCOPY      ESOPHAGOGASTRODUODENOSCOPY N/A 1/17/2019    Procedure: EGD (ESOPHAGOGASTRODUODENOSCOPY);  Surgeon: Dimitri Leary MD;  Location: [address];  Service: Endoscopy;  Laterality: N/A;  Outside cardiology records under media tab.    ESOPHAGOGASTRODUODENOSCOPY N/A 4/16/2019    Procedure: EGD (ESOPHAGOGASTRODUODENOSCOPY), Horner's;  Surgeon: Dimitri Leary MD;   Location: [facility];  Service: Endoscopy;  Laterality: N/A;    ESOPHAGOGASTRODUODENOSCOPY N/A 7/22/2019    Procedure: EGD (ESOPHAGOGASTRODUODENOSCOPY);  Surgeon: Dimitri Leary MD;  Location: [facility];  Service: Endoscopy;  Laterality: N/A;    HEMORRHOID SURGERY      LAPAROSCOPIC LEFT COLON RESECTION      SIGMOIDECTOMY  2015    TONSILLECTOMY, ADENOIDECTOMY       Family History   Problem Relation Age of Onset    Heart disease Father      Social History     Tobacco Use    Smoking status: Current Every Day Smoker     Packs/day: 1.00     Types: Cigarettes    Smokeless tobacco: Never Used   Substance Use Topics    Alcohol use: Yes     Comment: 3.6 oz/week    Drug use: No       Review of Systems:  Respiratory: no cough or shortness of breath  Cardiovascular: no chest pain or palpitations  Gastrointestinal: negative for abdominal pain, dysphagia and reflux symptoms    OBJECTIVE:     Vital Signs (Most Recent)       Physical Exam:  General: well developed, well nourished  Lungs:  normal respiratory effort  Heart: regular rate, S1, S2 normal    Laboratory  CBC: No results for input(s): WBC, RBC, HGB, HCT, PLT, MCV, MCH, MCHC in the last 168 hours.  CMP: No results for input(s): GLU, CALCIUM, ALBUMIN, PROT, NA, K, CO2, CL, BUN, CREATININE, ALKPHOS, ALT, AST, BILITOT in the last 168 hours.  Coagulation: No results for input(s): LABPROT, INR, APTT in the last 168 hours.      Diagnostic Results:      ASSESSMENT/PLAN:     Horner's with LGD    Plan: EGD    Anesthesia Plan: MAC    ASA Grade: ASA 2 - Patient with mild systemic disease with no functional limitations     The impression and plan was discussed in detail with the patient and family. All questions have been answered and the patient voices understanding of our plan at this point. The risk of the procedure was discussed in detail which includes but not limited to bleeding, infection, perforation in some cases requiring surgery with its spectrum of  complications.

## 2019-11-08 NOTE — ANESTHESIA PREPROCEDURE EVALUATION
11/08/2019  Devon Berry is a 73 y.o., male with h/o CAD/CABG 2016, afib (on amiodarone), HTN, multiple GI issues, who how presents for repeat EGD.  Of note, EGD in 1/2019 was uncomplicated. EF 30%    Past Medical History:   Diagnosis Date    Horner's esophagus     CAD (coronary artery disease)     Colon cancer     Colon polyps     Diverticulosis of colon     GERD (gastroesophageal reflux disease)     Heartburn     Hemorrhoids     HTN (hypertension)     Hyperlipidemia     Pneumonia    smoker      Past Surgical History:   Procedure Laterality Date    CARDIAC SURGERY      COLONOSCOPY      CORONARY ARTERY BYPASS GRAFT  03/04/2016    3 vessels    CORONARY ARTERY BYPASS GRAFT      ESOPHAGOGASTRODUODENOSCOPY      ESOPHAGOGASTRODUODENOSCOPY N/A 1/17/2019    Procedure: EGD (ESOPHAGOGASTRODUODENOSCOPY);  Surgeon: Dimitri Leary MD;  Location: [address]);  Service: Endoscopy;  Laterality: N/A;  Outside cardiology records under media tab.    ESOPHAGOGASTRODUODENOSCOPY N/A 4/16/2019    Procedure: EGD (ESOPHAGOGASTRODUODENOSCOPY), Horner's;  Surgeon: Dimitri Leary MD;  Location: [facility];  Service: Endoscopy;  Laterality: N/A;    ESOPHAGOGASTRODUODENOSCOPY N/A 7/22/2019    Procedure: EGD (ESOPHAGOGASTRODUODENOSCOPY);  Surgeon: Dimitri Leary MD;  Location: [facility];  Service: Endoscopy;  Laterality: N/A;    HEMORRHOID SURGERY      LAPAROSCOPIC LEFT COLON RESECTION      SIGMOIDECTOMY  2015    TONSILLECTOMY, ADENOIDECTOMY           Pre-op Assessment    I have reviewed the Patient Summary Reports.     I have reviewed the Nursing Notes.   I have reviewed the Medications.     Review of Systems  Anesthesia Hx:   Denies Personal Hx of Anesthesia complications.   Social:  Smoker, No Alcohol Use    Hematology/Oncology:        Oncology Comments: Colon cancer    Cardiovascular:   Hypertension Past MI CAD  CABG/stent Dysrhythmias atrial fibrillation hyperlipidemia EF-30%   Hepatic/GI:   GERD Horner's esophagus,  Diverticulosis of colon,  Colon cancer       Physical Exam  General:  Well nourished    Airway/Jaw/Neck:  Airway Findings: Mouth Opening: Normal Tongue: Normal  General Airway Assessment: Adult  Mallampati: III  TM Distance: Normal, at least 6 cm      Dental:  Dental Findings: In tact   Chest/Lungs:  Chest/Lungs Clear    Heart/Vascular:  Heart Findings: Normal       Mental Status:  Mental Status Findings:  Alert and Oriented, Cooperative         Anesthesia Plan  Type of Anesthesia, risks & benefits discussed:  Anesthesia Type:  general, MAC  Patient's Preference:   Intra-op Monitoring Plan: standard ASA monitors  Intra-op Monitoring Plan Comments:   Post Op Pain Control Plan:   Post Op Pain Control Plan Comments:   Induction:   IV  Beta Blocker:  Patient is not currently on a Beta-Blocker (No further documentation required).       Informed Consent: Patient understands risks and agrees with Anesthesia plan.  Questions answered. Anesthesia consent signed with patient.  ASA Score: 3     Day of Surgery Review of History & Physical: I have interviewed and examined the patient. I have reviewed the patient's H&P dated:    H&P update referred to the provider.         Ready For Surgery From Anesthesia Perspective.

## 2019-11-08 NOTE — ANESTHESIA POSTPROCEDURE EVALUATION
Anesthesia Post Evaluation    Patient: Devon Berry    Procedure(s) Performed: Procedure(s) (LRB):  EGD (ESOPHAGOGASTRODUODENOSCOPY) (N/A)    Final Anesthesia Type: MAC  Patient location during evaluation: GI PACU  Patient participation: Yes- Able to Participate  Level of consciousness: awake and alert and oriented  Post-procedure vital signs: reviewed and stable  Pain management: adequate  Airway patency: patent  PONV status at discharge: No PONV  Anesthetic complications: no      Cardiovascular status: blood pressure returned to baseline, hemodynamically stable and stable  Respiratory status: unassisted, spontaneous ventilation and room air  Hydration status: euvolemic  Follow-up not needed.          Vitals Value Taken Time   /83 11/8/2019  8:13 AM   Temp 37.1 °C (98.8 °F) 11/8/2019  8:13 AM   Pulse 60 11/8/2019  8:13 AM   Resp 16 11/8/2019  8:13 AM   SpO2 98 % 11/8/2019  8:13 AM         No case tracking events are documented in the log.      Pain/Angel Score: No data recorded

## 2019-11-08 NOTE — TRANSFER OF CARE
Anesthesia Transfer of Care Note    Patient: Devon Berry    Procedure(s) Performed: Procedure(s) (LRB):  EGD (ESOPHAGOGASTRODUODENOSCOPY) (N/A)    Patient location: GI    Anesthesia Type: MAC    Transport from OR: Transported from OR on room air with adequate spontaneous ventilation    Post pain: adequate analgesia    Post assessment: no apparent anesthetic complications and tolerated procedure well    Post vital signs: stable    Level of consciousness: awake and alert    Nausea/Vomiting: no nausea/vomiting    Complications: none    Transfer of care protocol was followed      Last vitals:   Visit Vitals  /83   Pulse 62   Temp 37.1 °C (98.8 °F) (Temporal)   Resp 16   Ht 6' (1.829 m)   Wt 113.4 kg (250 lb)   SpO2 97%   BMI 33.91 kg/m²

## 2019-11-08 NOTE — [employer] PATIENT INSTRUCTIONS
Discharge Summary/Instructions after an Endoscopic Procedure  Patient Name: Devon Berry  Patient MRN: [id number]  Patient YOB: 1945 Friday, November 08, 2019  Dimitri Leary MD  RESTRICTIONS:  During your procedure today, you received medications for sedation.  These   medications may affect your judgment, balance and coordination.  Therefore,   for 24 hours, you have the following restrictions:   - DO NOT drive a car, operate machinery, make legal/financial decisions,   sign important papers or drink alcohol.    ACTIVITY:  Today: no heavy lifting, straining or running due to procedural   sedation/anesthesia.  The following day: return to full activity including work.  DIET:  Eat and drink normally unless instructed otherwise.     TREATMENT FOR COMMON SIDE EFFECTS:  - Mild abdominal pain, nausea, belching, bloating or excessive gas:  rest,   eat lightly and use a heating pad.  - Sore Throat: treat with throat lozenges and/or gargle with warm salt   water.  - Because air was used during the procedure, expelling large amounts of air   from your rectum or belching is normal.  - If a bowel prep was taken, you may not have a bowel movement for 1-3 days.    This is normal.  SYMPTOMS TO WATCH FOR AND REPORT TO YOUR PHYSICIAN:  1. Abdominal pain or bloating, other than gas cramps.  2. Chest pain.  3. Back pain.  4. Signs of infection such as: chills or fever occurring within 24 hours   after the procedure.  5. Rectal bleeding, which would show as bright red, maroon, or black stools.   (A tablespoon of blood from the rectum is not serious, especially if   hemorrhoids are present.)  6. Vomiting.  7. Weakness or dizziness.  GO DIRECTLY TO THE NEAREST EMERGENCY ROOM IF YOU HAVE ANY OF THE FOLLOWING:      Difficulty breathing              Chills and/or fever over 101 F   Persistent vomiting and/or vomiting blood   Severe abdominal pain   Severe chest pain   Black, tarry stools   Bleeding- more than one  tablespoon   Any other symptom or condition that you feel may need urgent attention  Your doctor recommends these additional instructions:  If any biopsies were taken, your doctors clinic will contact you in 1 to 2   weeks with any results.  - Discharge patient to home (ambulatory).   - Resume previous diet.   - Repeat upper endoscopy in 4 months for follow-up of Horner's ablation.   - Use Prilosec (omeprazole) 40 mg PO BID.   - Use sucralfate suspension 1 gram PO BID.  For questions, problems or results please call your physician - Dimitri Leary MD at Work:  [phone].  EMERGENCY PHONE NUMBER: [phone],  LAB RESULTS: [phone]  IF A COMPLICATION OR EMERGENCY SITUATION ARISES AND YOU ARE UNABLE TO REACH   YOUR PHYSICIAN - GO DIRECTLY TO THE EMERGENCY ROOM.  Dimitri Leary MD  11/8/2019 8:59:03 AM  This report has been verified and signed electronically.  [employer]

## 2019-11-12 ENCOUNTER — TELEPHONE (OUTPATIENT)
Dept: GASTROENTEROLOGY | Facility: CLINIC | Age: 74
End: 2019-11-12

## 2019-11-12 NOTE — TELEPHONE ENCOUNTER
Post procedure Instructions: Repeat upper endoscopy in 4 months for follow-up of Horner's ablation. Please place on recall.

## 2020-03-05 ENCOUNTER — TELEPHONE (OUTPATIENT)
Dept: ENDOSCOPY | Facility: HOSPITAL | Age: 75
End: 2020-03-05

## 2020-03-05 NOTE — TELEPHONE ENCOUNTER
Spoke with patient's wife Damari Berry about arrival time @ 0930.     NPO status reviewed: Patient must have nothing to eat after midnight.  Pt may have CLEAR liquids ONLY until completely NPO 3 hrs @ *    Medications: Do not take Insulin or oral diabetic medications the day of the procedure.  Take as prescribed: heart, seizure and blood pressure medication in the morning with a sip of water (less than an ounce).  Take any breathing medications and bring inhalers to hospital with you Leave all valuables and jewelry at home.     Wear comfortable clothes to procedure to change into hospital gown You cannot drive for 24 hours after your procedure because you will receive sedation for your procedure to make you comfortable.  A ride must be provided at discharge.

## 2020-03-09 ENCOUNTER — ANESTHESIA EVENT (OUTPATIENT)
Dept: ENDOSCOPY | Facility: HOSPITAL | Age: 75
End: 2020-03-09
Payer: MEDICARE

## 2020-03-09 ENCOUNTER — ANESTHESIA (OUTPATIENT)
Dept: ENDOSCOPY | Facility: HOSPITAL | Age: 75
End: 2020-03-09
Payer: MEDICARE

## 2020-03-09 ENCOUNTER — HOSPITAL ENCOUNTER (OUTPATIENT)
Facility: HOSPITAL | Age: 75
Discharge: HOME | End: 2020-03-09
Attending: INTERNAL MEDICINE | Admitting: INTERNAL MEDICINE
Payer: MEDICARE

## 2020-03-09 VITALS
WEIGHT: 255 LBS | DIASTOLIC BLOOD PRESSURE: 84 MMHG | SYSTOLIC BLOOD PRESSURE: 130 MMHG | HEIGHT: 72 IN | BODY MASS INDEX: 34.54 KG/M2 | HEART RATE: 74 BPM | RESPIRATION RATE: 21 BRPM | TEMPERATURE: 98 F | OXYGEN SATURATION: 97 %

## 2020-03-09 DIAGNOSIS — K22.719 BARRETT'S ESOPHAGUS WITH DYSPLASIA: Primary | ICD-10-CM

## 2020-03-09 PROCEDURE — 88305 TISSUE EXAM BY PATHOLOGIST: CPT | Performed by: PATHOLOGY

## 2020-03-09 PROCEDURE — 43239 EGD BIOPSY SINGLE/MULTIPLE: CPT | Mod: 59,,, | Performed by: INTERNAL MEDICINE

## 2020-03-09 PROCEDURE — 37000009 HC ANESTHESIA EA ADD 15 MINS: Performed by: INTERNAL MEDICINE

## 2020-03-09 PROCEDURE — 43270 EGD LESION ABLATION: CPT | Performed by: INTERNAL MEDICINE

## 2020-03-09 PROCEDURE — 43270 PR EGD, FLEX, W/ABLATION, TUMOR/POLYP/LESION(S), W/ DILATION: ICD-10-PCS | Mod: ,,, | Performed by: INTERNAL MEDICINE

## 2020-03-09 PROCEDURE — 63600175 PHARM REV CODE 636 W HCPCS: Performed by: NURSE ANESTHETIST, CERTIFIED REGISTERED

## 2020-03-09 PROCEDURE — C1886 CATHETER, ABLATION: HCPCS | Performed by: INTERNAL MEDICINE

## 2020-03-09 PROCEDURE — 88305 TISSUE EXAM BY PATHOLOGIST: ICD-10-PCS | Mod: 26,,, | Performed by: PATHOLOGY

## 2020-03-09 PROCEDURE — 25000003 PHARM REV CODE 250: Performed by: NURSE ANESTHETIST, CERTIFIED REGISTERED

## 2020-03-09 PROCEDURE — 43239 PR EGD, FLEX, W/BIOPSY, SGL/MULTI: ICD-10-PCS | Mod: 59,,, | Performed by: INTERNAL MEDICINE

## 2020-03-09 PROCEDURE — 88305 TISSUE EXAM BY PATHOLOGIST: CPT | Mod: 26,,, | Performed by: PATHOLOGY

## 2020-03-09 PROCEDURE — 43239 EGD BIOPSY SINGLE/MULTIPLE: CPT | Performed by: INTERNAL MEDICINE

## 2020-03-09 PROCEDURE — 37000008 HC ANESTHESIA 1ST 15 MINUTES: Performed by: INTERNAL MEDICINE

## 2020-03-09 PROCEDURE — 27201012 HC FORCEPS, HOT/COLD, DISP: Performed by: INTERNAL MEDICINE

## 2020-03-09 PROCEDURE — 63600175 PHARM REV CODE 636 W HCPCS: Performed by: INTERNAL MEDICINE

## 2020-03-09 PROCEDURE — 43270 EGD LESION ABLATION: CPT | Mod: ,,, | Performed by: INTERNAL MEDICINE

## 2020-03-09 RX ORDER — SUCRALFATE 1 G/1
TABLET ORAL
Qty: 60 TABLET | Refills: 0 | Status: ON HOLD | OUTPATIENT
Start: 2020-03-09 | End: 2021-04-09 | Stop reason: CLARIF

## 2020-03-09 RX ORDER — MIDAZOLAM HYDROCHLORIDE 1 MG/ML
INJECTION, SOLUTION INTRAMUSCULAR; INTRAVENOUS
Status: DISCONTINUED | OUTPATIENT
Start: 2020-03-09 | End: 2020-03-09

## 2020-03-09 RX ORDER — GLYCOPYRROLATE 0.2 MG/ML
INJECTION INTRAMUSCULAR; INTRAVENOUS
Status: DISCONTINUED | OUTPATIENT
Start: 2020-03-09 | End: 2020-03-09

## 2020-03-09 RX ORDER — PROPOFOL 10 MG/ML
INJECTION, EMULSION INTRAVENOUS
Status: DISCONTINUED | OUTPATIENT
Start: 2020-03-09 | End: 2020-03-09

## 2020-03-09 RX ORDER — PROPOFOL 10 MG/ML
INJECTION, EMULSION INTRAVENOUS CONTINUOUS PRN
Status: DISCONTINUED | OUTPATIENT
Start: 2020-03-09 | End: 2020-03-09

## 2020-03-09 RX ORDER — LIDOCAINE HCL/PF 100 MG/5ML
SYRINGE (ML) INTRAVENOUS
Status: DISCONTINUED | OUTPATIENT
Start: 2020-03-09 | End: 2020-03-09

## 2020-03-09 RX ORDER — SODIUM CHLORIDE 9 MG/ML
INJECTION, SOLUTION INTRAVENOUS CONTINUOUS
Status: DISCONTINUED | OUTPATIENT
Start: 2020-03-09 | End: 2020-03-09 | Stop reason: HOSPADM

## 2020-03-09 RX ORDER — OMEPRAZOLE 20 MG/1
40 CAPSULE, DELAYED RELEASE ORAL 2 TIMES DAILY
Qty: 120 CAPSULE | Refills: 2 | Status: SHIPPED | OUTPATIENT
Start: 2020-03-09 | End: 2020-08-01

## 2020-03-09 RX ORDER — SODIUM CHLORIDE 0.9 % (FLUSH) 0.9 %
10 SYRINGE (ML) INJECTION
Status: DISCONTINUED | OUTPATIENT
Start: 2020-03-09 | End: 2020-03-09 | Stop reason: HOSPADM

## 2020-03-09 RX ADMIN — SODIUM CHLORIDE: 0.9 INJECTION, SOLUTION INTRAVENOUS at 10:03

## 2020-03-09 RX ADMIN — LIDOCAINE HYDROCHLORIDE 50 MG: 20 INJECTION, SOLUTION INTRAVENOUS at 10:03

## 2020-03-09 RX ADMIN — GLYCOPYRROLATE 0.2 MG: 0.2 INJECTION, SOLUTION INTRAMUSCULAR; INTRAVENOUS at 10:03

## 2020-03-09 RX ADMIN — MIDAZOLAM 2 MG: 1 INJECTION INTRAMUSCULAR; INTRAVENOUS at 10:03

## 2020-03-09 RX ADMIN — PROPOFOL 120 MCG/KG/MIN: 10 INJECTION, EMULSION INTRAVENOUS at 10:03

## 2020-03-09 RX ADMIN — TOPICAL ANESTHETIC 1 EACH: 200 SPRAY DENTAL; PERIODONTAL at 10:03

## 2020-03-09 RX ADMIN — PROPOFOL 70 MG: 10 INJECTION, EMULSION INTRAVENOUS at 10:03

## 2020-03-09 NOTE — ANESTHESIA PREPROCEDURE EVALUATION
03/09/2020  Devon Berry is a 74 y.o., male presents for EGD under MAC.    Past Medical History:   Diagnosis Date    Horner's esophagus     CAD (coronary artery disease)     Colon cancer     Colon polyps     Diverticulosis of colon     GERD (gastroesophageal reflux disease)     Heartburn     Hemorrhoids     HTN (hypertension)     Hyperlipidemia     Pneumonia      Past Surgical History:   Procedure Laterality Date    CARDIAC SURGERY      COLONOSCOPY      CORONARY ARTERY BYPASS GRAFT  03/04/2016    3 vessels    CORONARY ARTERY BYPASS GRAFT      ESOPHAGOGASTRODUODENOSCOPY      ESOPHAGOGASTRODUODENOSCOPY N/A 1/17/2019    Procedure: EGD (ESOPHAGOGASTRODUODENOSCOPY);  Surgeon: Dimitri Leary MD;  Location: [address]);  Service: Endoscopy;  Laterality: N/A;  Outside cardiology records under media tab.    ESOPHAGOGASTRODUODENOSCOPY N/A 4/16/2019    Procedure: EGD (ESOPHAGOGASTRODUODENOSCOPY), Horner's;  Surgeon: Dimitri Leary MD;  Location: [facility];  Service: Endoscopy;  Laterality: N/A;    ESOPHAGOGASTRODUODENOSCOPY N/A 7/22/2019    Procedure: EGD (ESOPHAGOGASTRODUODENOSCOPY);  Surgeon: Dimitri Leary MD;  Location: [facility];  Service: Endoscopy;  Laterality: N/A;    ESOPHAGOGASTRODUODENOSCOPY N/A 11/8/2019    Procedure: EGD (ESOPHAGOGASTRODUODENOSCOPY);  Surgeon: Dimitri Leary MD;  Location: [facility];  Service: Endoscopy;  Laterality: N/A;    HEMORRHOID SURGERY      LAPAROSCOPIC LEFT COLON RESECTION      SIGMOIDECTOMY  2015    TONSILLECTOMY, ADENOIDECTOMY           Anesthesia Evaluation    I have reviewed the Patient Summary Reports.    I have reviewed the Nursing Notes.   I have reviewed the Medications.     Review of Systems  Anesthesia Hx:  No problems with previous Anesthesia   Denies Personal Hx of Anesthesia complications.   Social:  Smoker,  No Alcohol Use    Hematology/Oncology:        Oncology Comments: Colon cancer   Cardiovascular:   Hypertension Past MI CAD  CABG/stent Dysrhythmias atrial fibrillation hyperlipidemia EF-30%   Pulmonary:   Pneumonia    Hepatic/GI:   GERD Horner's esophagus,  Diverticulosis of colon,  Colon cancer       Physical Exam  General:  Well nourished    Airway/Jaw/Neck:  Airway Findings: Mouth Opening: Normal Tongue: Normal  General Airway Assessment: Adult  Mallampati: II  TM Distance: Normal, at least 6 cm       Chest/Lungs:  Chest/Lungs Findings: Clear to auscultation, Normal Respiratory Rate     Heart/Vascular:  Heart Findings: Rate: Normal  Rhythm: Regular Rhythm  Sounds: Normal        Mental Status:  Mental Status Findings:  Cooperative, Alert and Oriented         Anesthesia Plan  Type of Anesthesia, risks & benefits discussed:  Anesthesia Type:  MAC  Patient's Preference:   Intra-op Monitoring Plan:   Intra-op Monitoring Plan Comments:   Post Op Pain Control Plan: multimodal analgesia  Post Op Pain Control Plan Comments:   Induction:   IV  Beta Blocker:         Informed Consent: Patient understands risks and agrees with Anesthesia plan.  Questions answered. Anesthesia consent signed with patient.  ASA Score: 3     Day of Surgery Review of History & Physical:  There are no significant changes.          Ready For Surgery From Anesthesia Perspective.

## 2020-03-09 NOTE — DISCHARGE SUMMARY
Discharge Summary/Instructions after an Endoscopic Procedure    Patient Name: Devon Berry  Patient MRN: [id number]  Patient YOB: 1945 Monday, March 09, 2020  Dimitri Leary MD    RESTRICTIONS:  During your procedure today, you received medications for sedation.  These medications may affect your judgment, balance and coordination.  Therefore, for 24 hours, you have the following restrictions:     - DO NOT drive a car, operate machinery, make legal/financial decisions, sign important papers or drink alcohol.      ACTIVITY:  Today: no heavy lifting, straining or running due to procedural sedation/anesthesia.  The following day: return to full activity including work.    DIET:  Eat and drink normally unless instructed otherwise.     TREATMENT FOR COMMON SIDE EFFECTS:  - Mild abdominal pain, nausea, belching, bloating or excessive gas:  rest, eat lightly and use a heating pad.  - Sore Throat: treat with throat lozenges and/or gargle with warm salt water.  - Because air was used during the procedure, expelling large amounts of air from your rectum or belching is normal.  - If a bowel prep was taken, you may not have a bowel movement for 1-3 days.  This is normal.      SYMPTOMS TO WATCH FOR AND REPORT TO YOUR PHYSICIAN:  1. Abdominal pain or bloating, other than gas cramps.  2. Chest pain.  3. Back pain.  4. Signs of infection such as: chills or fever occurring within 24 hours after the procedure.  5. Rectal bleeding, which would show as bright red, maroon, or black stools. (A tablespoon of blood from the rectum is not serious, especially if hemorrhoids are present.)  6. Vomiting.  7. Weakness or dizziness.      GO DIRECTLY TO THE NEAREST EMERGENCY ROOM IF YOU HAVE ANY OF THE FOLLOWING:     Difficulty breathing              Chills and/or fever over 101 F   Persistent vomiting and/or vomiting blood   Severe abdominal pain   Severe chest pain   Black, tarry stools   Bleeding- more than one  tablespoon   Any other symptom or condition that you feel may need urgent attention    Your doctor recommends these additional instructions:  If any biopsies were taken, your doctors clinic will contact you in 1 to 2 weeks with any results.    - Discharge patient to home (ambulatory).   - Await pathology results.   - Repeat upper endoscopy in 4 months for follow-up of Horner's ablation.   - Use Prilosec (omeprazole) 40 mg PO BID.   - Use sucralfate suspension 1 gram PO BID.   - Resume previous diet.    For questions, problems or results please call your physician - Dimitri Leary MD at Work:  [phone].    EMERGENCY PHONE NUMBER: [phone],  LAB RESULTS: [phone]    IF A COMPLICATION OR EMERGENCY SITUATION ARISES AND YOU ARE UNABLE TO REACH YOUR PHYSICIAN - GO DIRECTLY TO THE EMERGENCY ROOM.

## 2020-03-09 NOTE — [employer]ATION PATIENT INSTRUCTIONS
Discharge Summary/Instructions after an Endoscopic Procedure  Patient Name: Devon Berry  Patient MRN: [id number]  Patient YOB: 1945 Monday, March 09, 2020  Dimitri Leary MD  RESTRICTIONS:  During your procedure today, you received medications for sedation.  These   medications may affect your judgment, balance and coordination.  Therefore,   for 24 hours, you have the following restrictions:   - DO NOT drive a car, operate machinery, make legal/financial decisions,   sign important papers or drink alcohol.    ACTIVITY:  Today: no heavy lifting, straining or running due to procedural   sedation/anesthesia.  The following day: return to full activity including work.  DIET:  Eat and drink normally unless instructed otherwise.     TREATMENT FOR COMMON SIDE EFFECTS:  - Mild abdominal pain, nausea, belching, bloating or excessive gas:  rest,   eat lightly and use a heating pad.  - Sore Throat: treat with throat lozenges and/or gargle with warm salt   water.  - Because air was used during the procedure, expelling large amounts of air   from your rectum or belching is normal.  - If a bowel prep was taken, you may not have a bowel movement for 1-3 days.    This is normal.  SYMPTOMS TO WATCH FOR AND REPORT TO YOUR PHYSICIAN:  1. Abdominal pain or bloating, other than gas cramps.  2. Chest pain.  3. Back pain.  4. Signs of infection such as: chills or fever occurring within 24 hours   after the procedure.  5. Rectal bleeding, which would show as bright red, maroon, or black stools.   (A tablespoon of blood from the rectum is not serious, especially if   hemorrhoids are present.)  6. Vomiting.  7. Weakness or dizziness.  GO DIRECTLY TO THE NEAREST EMERGENCY ROOM IF YOU HAVE ANY OF THE FOLLOWING:      Difficulty breathing              Chills and/or fever over 101 F   Persistent vomiting and/or vomiting blood   Severe abdominal pain   Severe chest pain   Black, tarry stools   Bleeding- more than one  tablespoon   Any other symptom or condition that you feel may need urgent attention  Your doctor recommends these additional instructions:  If any biopsies were taken, your doctors clinic will contact you in 1 to 2   weeks with any results.  - Discharge patient to home (ambulatory).   - Await pathology results.   - Repeat upper endoscopy in 4 months for follow-up of Horner's ablation.   - Use Prilosec (omeprazole) 40 mg PO BID.   - Use sucralfate suspension 1 gram PO BID.   - Resume previous diet.  For questions, problems or results please call your physician - Dimitri Leary MD at Work:  [phone].  EMERGENCY PHONE NUMBER: [phone],  LAB RESULTS: [phone]  IF A COMPLICATION OR EMERGENCY SITUATION ARISES AND YOU ARE UNABLE TO REACH   YOUR PHYSICIAN - GO DIRECTLY TO THE EMERGENCY ROOM.  Dimitri Leary MD  3/9/2020 11:11:45 AM  This report has been verified and signed electronically.  [employer]

## 2020-03-09 NOTE — ANESTHESIA POSTPROCEDURE EVALUATION
Anesthesia Post Evaluation    Patient: Devon Berry    Procedure(s) Performed: Procedure(s) (LRB):  EGD (ESOPHAGOGASTRODUODENOSCOPY) (N/A)    Final Anesthesia Type: MAC    Patient location during evaluation: GI PACU  Patient participation: Yes- Able to Participate  Level of consciousness: awake and alert and oriented  Post-procedure vital signs: reviewed and stable  Pain management: adequate  Airway patency: patent    PONV status at discharge: No PONV  Anesthetic complications: no      Cardiovascular status: blood pressure returned to baseline, hemodynamically stable and stable  Respiratory status: unassisted, spontaneous ventilation and room air  Hydration status: euvolemic  Follow-up not needed.          Vitals Value Taken Time   /85 3/9/2020 10:15 AM   Temp 36.6 °C (97.9 °F) 3/9/2020 10:15 AM   Pulse 58 3/9/2020 10:15 AM   Resp 18 3/9/2020 10:15 AM   SpO2 96 % 3/9/2020 10:15 AM         No case tracking events are documented in the log.      Pain/Agnel Score: No data recorded

## 2020-03-09 NOTE — H&P
History & Physical - Short Stay  Gastroenterology      SUBJECTIVE:     Procedure: EGD    Chief Complaint/Indication for Procedure: Horner's Esophagus    History of Present Illness:  Patient is a 74 y.o. male presents with Horner's with LGD S/P RFA here for follow up.     PTA Medications   Medication Sig    amiodarone (PACERONE) 200 MG Tab Take 100 mg by mouth once daily.    ascorbic acid, vitamin C, (VITAMIN C) 500 MG tablet Take 500 mg by mouth once daily.    aspirin (ECOTRIN) 81 MG EC tablet Take 81 mg by mouth once daily.    atorvastatin (LIPITOR) 20 MG tablet Take 20 mg by mouth every evening.     cyanocobalamin, vitamin B-12, 2,500 mcg Chew Take 1 tablet by mouth once daily.    ferrous sulfate (FEOSOL) 325 mg (65 mg iron) Tab tablet Take 325 mg by mouth 2 (two) times daily.     multivitamin with minerals tablet Take 1 tablet by mouth once daily.    omeprazole (PRILOSEC) 20 MG capsule Take 2 capsules (40 mg total) by mouth 2 (two) times daily.    sucralfate (CARAFATE) 1 gram tablet 1 tablet dissolved in 10 ml of water orally twice a day.       Review of patient's allergies indicates:  No Known Allergies     Past Medical History:   Diagnosis Date    Horner's esophagus     CAD (coronary artery disease)     Colon cancer     Colon polyps     Diverticulosis of colon     GERD (gastroesophageal reflux disease)     Heartburn     Hemorrhoids     HTN (hypertension)     Hyperlipidemia     Pneumonia      Past Surgical History:   Procedure Laterality Date    CARDIAC SURGERY      COLONOSCOPY      CORONARY ARTERY BYPASS GRAFT  03/04/2016    3 vessels    CORONARY ARTERY BYPASS GRAFT      ESOPHAGOGASTRODUODENOSCOPY      ESOPHAGOGASTRODUODENOSCOPY N/A 1/17/2019    Procedure: EGD (ESOPHAGOGASTRODUODENOSCOPY);  Surgeon: Dimitri Leary MD;  Location: [address];  Service: Endoscopy;  Laterality: N/A;  Outside cardiology records under media tab.    ESOPHAGOGASTRODUODENOSCOPY N/A 4/16/2019     Procedure: EGD (ESOPHAGOGASTRODUODENOSCOPY), Horner's;  Surgeon: Dimitri Leary MD;  Location: [facility];  Service: Endoscopy;  Laterality: N/A;    ESOPHAGOGASTRODUODENOSCOPY N/A 7/22/2019    Procedure: EGD (ESOPHAGOGASTRODUODENOSCOPY);  Surgeon: Dimitri Leary MD;  Location: [facility] ENDO;  Service: Endoscopy;  Laterality: N/A;    ESOPHAGOGASTRODUODENOSCOPY N/A 11/8/2019    Procedure: EGD (ESOPHAGOGASTRODUODENOSCOPY);  Surgeon: Dimitri Leary MD;  Location: [facility] ENDO;  Service: Endoscopy;  Laterality: N/A;    HEMORRHOID SURGERY      LAPAROSCOPIC LEFT COLON RESECTION      SIGMOIDECTOMY  2015    TONSILLECTOMY, ADENOIDECTOMY       Family History   Problem Relation Age of Onset    Heart disease Father      Social History     Tobacco Use    Smoking status: Current Every Day Smoker     Packs/day: 1.00     Types: Cigarettes    Smokeless tobacco: Never Used   Substance Use Topics    Alcohol use: Yes     Comment: 3.6 oz/week    Drug use: No       Review of Systems:  Constitutional: no fever or chills  Respiratory: no cough or shortness of breath  Cardiovascular: no chest pain or palpitations  Gastrointestinal: no nausea or vomiting, no abdominal pain or change in bowel habits    OBJECTIVE:     Vital Signs (Most Recent)       Physical Exam:  General: well developed, well nourished  Lungs:  normal respiratory effort  Heart: regular rate, S1, S2 normal    Laboratory  CBC: No results for input(s): WBC, RBC, HGB, HCT, PLT, MCV, MCH, MCHC in the last 168 hours.  CMP: No results for input(s): GLU, CALCIUM, ALBUMIN, PROT, NA, K, CO2, CL, BUN, CREATININE, ALKPHOS, ALT, AST, BILITOT in the last 168 hours.  Coagulation: No results for input(s): LABPROT, INR, APTT in the last 168 hours.      Diagnostic Results:      ASSESSMENT/PLAN:     Horner's with LGD    Plan: EGD    Anesthesia Plan: MAC    ASA Grade: ASA 3 - Patient with moderate systemic disease with functional limitations     The impression and plan was  discussed in detail with the patient and family. All questions have been answered and the patient voices understanding of our plan at this point. The risk of the procedure was discussed in detail which includes but not limited to bleeding, infection, perforation in some cases requiring surgery with its spectrum of complications.

## 2020-03-09 NOTE — TRANSFER OF CARE
Anesthesia Transfer of Care Note    Patient: Devon Berry    Procedure(s) Performed: Procedure(s) (LRB):  EGD (ESOPHAGOGASTRODUODENOSCOPY) (N/A)    Patient location: GI    Anesthesia Type: MAC    Transport from OR: Transported from OR on room air with adequate spontaneous ventilation    Post pain: adequate analgesia    Post assessment: no apparent anesthetic complications and tolerated procedure well    Post vital signs: stable    Level of consciousness: awake, alert and oriented    Nausea/Vomiting: no nausea/vomiting    Complications: none    Transfer of care protocol was followed      Last vitals:   Visit Vitals  /85   Pulse (!) 58   Temp 36.6 °C (97.9 °F)   Resp 18   Ht 6' (1.829 m)   Wt 115.7 kg (255 lb)   SpO2 96%   BMI 34.58 kg/m²

## 2020-03-16 ENCOUNTER — TELEPHONE (OUTPATIENT)
Dept: ENDOSCOPY | Facility: HOSPITAL | Age: 75
End: 2020-03-16

## 2020-03-16 DIAGNOSIS — K22.719 BARRETT'S ESOPHAGUS WITH DYSPLASIA: Primary | ICD-10-CM

## 2020-03-17 ENCOUNTER — TELEPHONE (OUTPATIENT)
Dept: GASTROENTEROLOGY | Facility: CLINIC | Age: 75
End: 2020-03-17

## 2020-03-17 LAB
FINAL PATHOLOGIC DIAGNOSIS: NORMAL
GROSS: NORMAL

## 2020-03-20 ENCOUNTER — TELEPHONE (OUTPATIENT)
Dept: ENDOSCOPY | Facility: HOSPITAL | Age: 75
End: 2020-03-20

## 2020-03-21 NOTE — TELEPHONE ENCOUNTER
----- Message from Dimitri Leary MD sent at 3/18/2020 11:07 AM CDT -----  Please let the patient know the biopsies of the GE junction did not showed Horner's. Repeat EGD in 6 months.

## 2020-06-16 ENCOUNTER — TELEPHONE (OUTPATIENT)
Dept: GASTROENTEROLOGY | Facility: CLINIC | Age: 75
End: 2020-06-16

## 2020-07-01 ENCOUNTER — TELEPHONE (OUTPATIENT)
Dept: GASTROENTEROLOGY | Facility: CLINIC | Age: 75
End: 2020-07-01

## 2020-07-01 DIAGNOSIS — Z01.812 PRE-PROCEDURE LAB EXAM: Primary | ICD-10-CM

## 2020-07-01 NOTE — TELEPHONE ENCOUNTER
Repeat upper endoscopy in 4 months for follow-up     of Horner's ablation. 3/9/20  Patient scheduled for 8/21/2020 spoke to wife and will mail out instructions. Verbalized understanding.

## 2020-08-18 ENCOUNTER — LAB VISIT (OUTPATIENT)
Dept: URGENT CARE | Facility: CLINIC | Age: 75
End: 2020-08-18
Payer: MEDICARE

## 2020-08-18 DIAGNOSIS — Z01.812 PRE-PROCEDURE LAB EXAM: ICD-10-CM

## 2020-08-18 PROCEDURE — U0003 INFECTIOUS AGENT DETECTION BY NUCLEIC ACID (DNA OR RNA); SEVERE ACUTE RESPIRATORY SYNDROME CORONAVIRUS 2 (SARS-COV-2) (CORONAVIRUS DISEASE [COVID-19]), AMPLIFIED PROBE TECHNIQUE, MAKING USE OF HIGH THROUGHPUT TECHNOLOGIES AS DESCRIBED BY CMS-2020-01-R: HCPCS

## 2020-08-19 ENCOUNTER — TELEPHONE (OUTPATIENT)
Dept: ENDOSCOPY | Facility: HOSPITAL | Age: 75
End: 2020-08-19

## 2020-08-19 LAB — SARS-COV-2 RNA RESP QL NAA+PROBE: NOT DETECTED

## 2020-08-19 NOTE — TELEPHONE ENCOUNTER
Left message instructing patient to call dept @ [phone] between 8am-4pm.    Arrival time to be given @ 0700

## 2020-08-19 NOTE — TELEPHONE ENCOUNTER
Spoke with patient about arrival time @ 0700.   Covid test =neg    NPO status reviewed: Patient must have nothing to eat after midnight.  Pt may have CLEAR liquids ONLY until completely NPO 3 hrs @ 0500    Medications: Do not take Insulin or oral diabetic medications the day of the procedure.  Take as prescribed: heart, seizure and blood pressure medication in the morning with a sip of water (less than an ounce).  Take any breathing medications and bring inhalers to hospital with you Leave all valuables and jewelry at home.     Wear comfortable clothes to procedure to change into hospital gown You cannot drive for 24 hours after your procedure because you will receive sedation for your procedure to make you comfortable.  A ride must be provided at discharge.

## 2020-08-20 ENCOUNTER — ANESTHESIA EVENT (OUTPATIENT)
Dept: ENDOSCOPY | Facility: HOSPITAL | Age: 75
End: 2020-08-20
Payer: MEDICARE

## 2020-08-20 NOTE — ANESTHESIA PREPROCEDURE EVALUATION
08/21/2020  Devon Berry is a 74 y.o., male w/ hx of GERD, Horner's esophagus, HTN, CAD s/p 3v CABG presents for EGD under MAC.    Past Medical History:   Diagnosis Date    Horner's esophagus     CAD (coronary artery disease)     Colon cancer     Colon polyps     Diverticulosis of colon     GERD (gastroesophageal reflux disease)     Heartburn     Hemorrhoids     HTN (hypertension)     Hyperlipidemia     Pneumonia      Past Surgical History:   Procedure Laterality Date    CARDIAC SURGERY      COLONOSCOPY      CORONARY ARTERY BYPASS GRAFT  03/04/2016    3 vessels    CORONARY ARTERY BYPASS GRAFT      ESOPHAGOGASTRODUODENOSCOPY      ESOPHAGOGASTRODUODENOSCOPY N/A 1/17/2019    Procedure: EGD (ESOPHAGOGASTRODUODENOSCOPY);  Surgeon: Dimitri Leary MD;  Location: [address]);  Service: Endoscopy;  Laterality: N/A;  Outside cardiology records under media tab.    ESOPHAGOGASTRODUODENOSCOPY N/A 4/16/2019    Procedure: EGD (ESOPHAGOGASTRODUODENOSCOPY), Horner's;  Surgeon: Dimitri Leary MD;  Location: [facility];  Service: Endoscopy;  Laterality: N/A;    ESOPHAGOGASTRODUODENOSCOPY N/A 7/22/2019    Procedure: EGD (ESOPHAGOGASTRODUODENOSCOPY);  Surgeon: Dimitri Leary MD;  Location: [facility];  Service: Endoscopy;  Laterality: N/A;    ESOPHAGOGASTRODUODENOSCOPY N/A 11/8/2019    Procedure: EGD (ESOPHAGOGASTRODUODENOSCOPY);  Surgeon: Dimitri Leary MD;  Location: [facility];  Service: Endoscopy;  Laterality: N/A;    ESOPHAGOGASTRODUODENOSCOPY N/A 3/9/2020    Procedure: EGD (ESOPHAGOGASTRODUODENOSCOPY);  Surgeon: Dimitri Leary MD;  Location: [facility];  Service: Endoscopy;  Laterality: N/A;    HEMORRHOID SURGERY      LAPAROSCOPIC LEFT COLON RESECTION      SIGMOIDECTOMY  2015    TONSILLECTOMY, ADENOIDECTOMY           Anesthesia Evaluation    I have reviewed the  Patient Summary Reports.    I have reviewed the Nursing Notes.    I have reviewed the Medications.     Review of Systems  Anesthesia Hx:  No problems with previous Anesthesia   Denies Personal Hx of Anesthesia complications.   Social:  Smoker, No Alcohol Use    Hematology/Oncology:  Hematology Normal   Oncology Normal   Oncology Comments: Colon cancer     EENT/Dental:EENT/Dental Normal   Cardiovascular:   Exercise tolerance: good Hypertension Past MI CAD  CABG/stent Dysrhythmias atrial fibrillation  Denies Angina. CHF hyperlipidemia EF-30%   Pulmonary:   Pneumonia    Renal/:  Renal/ Normal     Hepatic/GI:   GERD Horner's esophagus,  Diverticulosis of colon,  Colon cancer   Neurological:  Neurology Normal    Psych:  Psychiatric Normal           Physical Exam  General:  Well nourished    Airway/Jaw/Neck:  Airway Findings: Mouth Opening: Normal Tongue: Normal  General Airway Assessment: Adult  Mallampati: II  TM Distance: Normal, at least 6 cm       Chest/Lungs:  Chest/Lungs Findings: Clear to auscultation, Normal Respiratory Rate     Heart/Vascular:  Heart Findings: Rate: Normal  Rhythm: Regular Rhythm  Sounds: Normal        Mental Status:  Mental Status Findings:  Cooperative, Alert and Oriented         Anesthesia Plan  Type of Anesthesia, risks & benefits discussed:  Anesthesia Type:  MAC  Patient's Preference:   Intra-op Monitoring Plan: standard ASA monitors  Intra-op Monitoring Plan Comments:   Post Op Pain Control Plan: multimodal analgesia  Post Op Pain Control Plan Comments:   Induction:   IV  Beta Blocker:  Patient is not currently on a Beta-Blocker (No further documentation required).       Informed Consent: Patient understands risks and agrees with Anesthesia plan.  Questions answered. Anesthesia consent signed with patient.  ASA Score: 3     Day of Surgery Review of History & Physical: I have interviewed and examined the patient. I have reviewed the patient's H&P dated:  There are no significant  changes.          Ready For Surgery From Anesthesia Perspective.

## 2020-08-21 ENCOUNTER — ANESTHESIA (OUTPATIENT)
Dept: ENDOSCOPY | Facility: HOSPITAL | Age: 75
End: 2020-08-21
Payer: MEDICARE

## 2020-08-21 ENCOUNTER — HOSPITAL ENCOUNTER (OUTPATIENT)
Facility: HOSPITAL | Age: 75
Discharge: HOME | End: 2020-08-21
Attending: INTERNAL MEDICINE | Admitting: INTERNAL MEDICINE
Payer: MEDICARE

## 2020-08-21 VITALS
DIASTOLIC BLOOD PRESSURE: 66 MMHG | WEIGHT: 260 LBS | HEART RATE: 56 BPM | TEMPERATURE: 98 F | HEIGHT: 72 IN | RESPIRATION RATE: 6 BRPM | SYSTOLIC BLOOD PRESSURE: 116 MMHG | OXYGEN SATURATION: 95 % | BODY MASS INDEX: 35.21 KG/M2

## 2020-08-21 DIAGNOSIS — K22.719 BARRETT'S ESOPHAGUS WITH DYSPLASIA: Primary | ICD-10-CM

## 2020-08-21 PROCEDURE — 88305 TISSUE EXAM BY PATHOLOGIST: ICD-10-PCS | Mod: 26,,, | Performed by: PATHOLOGY

## 2020-08-21 PROCEDURE — 43239 PR EGD, FLEX, W/BIOPSY, SGL/MULTI: ICD-10-PCS | Mod: ,,, | Performed by: INTERNAL MEDICINE

## 2020-08-21 PROCEDURE — 88342 CHG IMMUNOCYTOCHEMISTRY: ICD-10-PCS | Mod: 26,,, | Performed by: PATHOLOGY

## 2020-08-21 PROCEDURE — 37000008 HC ANESTHESIA 1ST 15 MINUTES: Performed by: INTERNAL MEDICINE

## 2020-08-21 PROCEDURE — 63600175 PHARM REV CODE 636 W HCPCS: Performed by: NURSE ANESTHETIST, CERTIFIED REGISTERED

## 2020-08-21 PROCEDURE — 88305 TISSUE EXAM BY PATHOLOGIST: CPT | Mod: 26,,, | Performed by: PATHOLOGY

## 2020-08-21 PROCEDURE — 43239 EGD BIOPSY SINGLE/MULTIPLE: CPT | Performed by: INTERNAL MEDICINE

## 2020-08-21 PROCEDURE — 88305 TISSUE EXAM BY PATHOLOGIST: CPT | Performed by: PATHOLOGY

## 2020-08-21 PROCEDURE — 25000003 PHARM REV CODE 250: Performed by: NURSE ANESTHETIST, CERTIFIED REGISTERED

## 2020-08-21 PROCEDURE — 88342 IMHCHEM/IMCYTCHM 1ST ANTB: CPT | Mod: 26,,, | Performed by: PATHOLOGY

## 2020-08-21 PROCEDURE — 27201012 HC FORCEPS, HOT/COLD, DISP: Performed by: INTERNAL MEDICINE

## 2020-08-21 PROCEDURE — 25000003 PHARM REV CODE 250: Performed by: INTERNAL MEDICINE

## 2020-08-21 PROCEDURE — 43239 EGD BIOPSY SINGLE/MULTIPLE: CPT | Mod: ,,, | Performed by: INTERNAL MEDICINE

## 2020-08-21 PROCEDURE — 37000009 HC ANESTHESIA EA ADD 15 MINS: Performed by: INTERNAL MEDICINE

## 2020-08-21 PROCEDURE — 88342 IMHCHEM/IMCYTCHM 1ST ANTB: CPT | Performed by: PATHOLOGY

## 2020-08-21 RX ORDER — SODIUM CHLORIDE 9 MG/ML
INJECTION, SOLUTION INTRAVENOUS CONTINUOUS
Status: ACTIVE | OUTPATIENT
Start: 2020-08-21

## 2020-08-21 RX ORDER — PROPOFOL 10 MG/ML
VIAL (ML) INTRAVENOUS
Status: DISCONTINUED | OUTPATIENT
Start: 2020-08-21 | End: 2020-08-21

## 2020-08-21 RX ORDER — FENTANYL CITRATE 50 UG/ML
INJECTION, SOLUTION INTRAMUSCULAR; INTRAVENOUS
Status: DISCONTINUED | OUTPATIENT
Start: 2020-08-21 | End: 2020-08-21

## 2020-08-21 RX ORDER — SODIUM CHLORIDE 0.9 % (FLUSH) 0.9 %
10 SYRINGE (ML) INJECTION
Status: ACTIVE | OUTPATIENT
Start: 2020-08-21

## 2020-08-21 RX ORDER — GLYCOPYRROLATE 0.2 MG/ML
INJECTION INTRAMUSCULAR; INTRAVENOUS
Status: DISCONTINUED | OUTPATIENT
Start: 2020-08-21 | End: 2020-08-21

## 2020-08-21 RX ORDER — PROPOFOL 10 MG/ML
VIAL (ML) INTRAVENOUS CONTINUOUS PRN
Status: DISCONTINUED | OUTPATIENT
Start: 2020-08-21 | End: 2020-08-21

## 2020-08-21 RX ORDER — LIDOCAINE HCL/PF 100 MG/5ML
SYRINGE (ML) INTRAVENOUS
Status: DISCONTINUED | OUTPATIENT
Start: 2020-08-21 | End: 2020-08-21

## 2020-08-21 RX ADMIN — SODIUM CHLORIDE 20 ML/HR: 0.9 INJECTION, SOLUTION INTRAVENOUS at 07:08

## 2020-08-21 RX ADMIN — FENTANYL CITRATE 25 MCG: 50 INJECTION, SOLUTION INTRAMUSCULAR; INTRAVENOUS at 08:08

## 2020-08-21 RX ADMIN — PROPOFOL 100 MG: 10 INJECTION, EMULSION INTRAVENOUS at 08:08

## 2020-08-21 RX ADMIN — PROPOFOL 150 MCG/KG/MIN: 10 INJECTION, EMULSION INTRAVENOUS at 08:08

## 2020-08-21 RX ADMIN — GLYCOPYRROLATE 0.2 MG: 0.2 INJECTION, SOLUTION INTRAMUSCULAR; INTRAVENOUS at 08:08

## 2020-08-21 RX ADMIN — LIDOCAINE HYDROCHLORIDE 100 MG: 20 INJECTION, SOLUTION INTRAVENOUS at 08:08

## 2020-08-21 NOTE — H&P
History & Physical - Short Stay  Gastroenterology      SUBJECTIVE:     Procedure: EGD    Chief Complaint/Indication for Procedure: Horner's Esophagus    History of Present Illness:  Patient is a 74 y.o. male presents with Horner's esophagus with LGD S/P RFA.     PTA Medications   Medication Sig    cyanocobalamin, vitamin B-12, 2,500 mcg Chew Take 1 tablet by mouth once daily.    amiodarone (PACERONE) 200 MG Tab Take 100 mg by mouth once daily.    ascorbic acid, vitamin C, (VITAMIN C) 500 MG tablet Take 500 mg by mouth once daily.    aspirin (ECOTRIN) 81 MG EC tablet Take 81 mg by mouth once daily.    atorvastatin (LIPITOR) 20 MG tablet Take 20 mg by mouth every evening.     ferrous sulfate (FEOSOL) 325 mg (65 mg iron) Tab tablet Take 325 mg by mouth 2 (two) times daily.     multivitamin with minerals tablet Take 1 tablet by mouth once daily.    omeprazole (PRILOSEC) 20 MG capsule TAKE TWO CAPSULES BY MOUTH TWICE DAILY **THANK YOU**    sucralfate (CARAFATE) 1 gram tablet 1 tablet dissolved in 10 ml of water orally twice a day.       Review of patient's allergies indicates:  No Known Allergies     Past Medical History:   Diagnosis Date    Horner's esophagus     CAD (coronary artery disease)     Colon cancer     Colon polyps     Diverticulosis of colon     GERD (gastroesophageal reflux disease)     Heartburn     Hemorrhoids     HTN (hypertension)     Hyperlipidemia     Pneumonia      Past Surgical History:   Procedure Laterality Date    CARDIAC SURGERY      COLONOSCOPY      CORONARY ARTERY BYPASS GRAFT  03/04/2016    3 vessels    CORONARY ARTERY BYPASS GRAFT      ESOPHAGOGASTRODUODENOSCOPY      ESOPHAGOGASTRODUODENOSCOPY N/A 1/17/2019    Procedure: EGD (ESOPHAGOGASTRODUODENOSCOPY);  Surgeon: Dimitri Leary MD;  Location: [address];  Service: Endoscopy;  Laterality: N/A;  Outside cardiology records under media tab.    ESOPHAGOGASTRODUODENOSCOPY N/A 4/16/2019    Procedure: EGD  (ESOPHAGOGASTRODUODENOSCOPY), Horner's;  Surgeon: Dimitri Leary MD;  Location: [facility] ENDO;  Service: Endoscopy;  Laterality: N/A;    ESOPHAGOGASTRODUODENOSCOPY N/A 7/22/2019    Procedure: EGD (ESOPHAGOGASTRODUODENOSCOPY);  Surgeon: Dimitri Leary MD;  Location: [facility] ENDO;  Service: Endoscopy;  Laterality: N/A;    ESOPHAGOGASTRODUODENOSCOPY N/A 11/8/2019    Procedure: EGD (ESOPHAGOGASTRODUODENOSCOPY);  Surgeon: Dimitri Leary MD;  Location: [facility] ENDO;  Service: Endoscopy;  Laterality: N/A;    ESOPHAGOGASTRODUODENOSCOPY N/A 3/9/2020    Procedure: EGD (ESOPHAGOGASTRODUODENOSCOPY);  Surgeon: Dimitri Leary MD;  Location: [facility];  Service: Endoscopy;  Laterality: N/A;    HEMORRHOID SURGERY      LAPAROSCOPIC LEFT COLON RESECTION      SIGMOIDECTOMY  2015    TONSILLECTOMY, ADENOIDECTOMY       Family History   Problem Relation Age of Onset    Heart disease Father      Social History     Tobacco Use    Smoking status: Current Every Day Smoker     Packs/day: 1.00     Types: Cigarettes    Smokeless tobacco: Never Used   Substance Use Topics    Alcohol use: Yes     Comment: 3.6 oz/week    Drug use: No       Review of Systems:  Constitutional: no fever or chills  Respiratory: no cough or shortness of breath  Cardiovascular: no chest pain or palpitations  Gastrointestinal: no nausea or vomiting, no abdominal pain or change in bowel habits    OBJECTIVE:     Vital Signs (Most Recent)  Temp: 98.1 °F (36.7 °C) (08/21/20 0731)  Pulse: (!) 59 (08/21/20 0731)  Resp: 20 (08/21/20 0731)  BP: 115/62 (08/21/20 0731)  SpO2: 95 % (08/21/20 0731)    Physical Exam:  General: well developed, well nourished  Lungs:  normal respiratory effort  Heart: regular rate, S1, S2 normal    Laboratory  CBC: No results for input(s): WBC, RBC, HGB, HCT, PLT, MCV, MCH, MCHC in the last 168 hours.  CMP: No results for input(s): GLU, CALCIUM, ALBUMIN, PROT, NA, K, CO2, CL, BUN, CREATININE, ALKPHOS, ALT, AST, BILITOT in the last 168  hours.  Coagulation: No results for input(s): LABPROT, INR, APTT in the last 168 hours.    Diagnostic Results:      ASSESSMENT/PLAN:     Horner's with dysplasia    Plan: EGD    Anesthesia Plan: MAC    ASA Grade: ASA 3 - Patient with moderate systemic disease with functional limitations     The impression and plan was discussed in detail with the patient and family. All questions have been answered and the patient voices understanding of our plan at this point. The risk of the procedure was discussed in detail which includes but not limited to bleeding, infection, perforation in some cases requiring surgery with its spectrum of complications.

## 2020-08-21 NOTE — [employer] PATIENT INSTRUCTIONS
Discharge Summary/Instructions after an Endoscopic Procedure  Patient Name: Devon Berry  Patient MRN: [id number]  Patient YOB: 1945 Friday, August 21, 2020  Dimitri Leary MD  Your health is very important to us during the Covid Crisis. Following your   procedure today, you will receive a daily text for 2 weeks asking about   signs or symptoms of Covid 19.  Please respond to this text when you   receive it so we can follow up and keep you as safe as possible.   RESTRICTIONS:  During your procedure today, you received medications for sedation.  These   medications may affect your judgment, balance and coordination.  Therefore,   for 24 hours, you have the following restrictions:   - DO NOT drive a car, operate machinery, make legal/financial decisions,   sign important papers or drink alcohol.    ACTIVITY:  Today: no heavy lifting, straining or running due to procedural   sedation/anesthesia.  The following day: return to full activity including work.  DIET:  Eat and drink normally unless instructed otherwise.     TREATMENT FOR COMMON SIDE EFFECTS:  - Mild abdominal pain, nausea, belching, bloating or excessive gas:  rest,   eat lightly and use a heating pad.  - Sore Throat: treat with throat lozenges and/or gargle with warm salt   water.  - Because air was used during the procedure, expelling large amounts of air   from your rectum or belching is normal.  - If a bowel prep was taken, you may not have a bowel movement for 1-3 days.    This is normal.  SYMPTOMS TO WATCH FOR AND REPORT TO YOUR PHYSICIAN:  1. Abdominal pain or bloating, other than gas cramps.  2. Chest pain.  3. Back pain.  4. Signs of infection such as: chills or fever occurring within 24 hours   after the procedure.  5. Rectal bleeding, which would show as bright red, maroon, or black stools.   (A tablespoon of blood from the rectum is not serious, especially if   hemorrhoids are present.)  6. Vomiting.  7. Weakness or  dizziness.  GO DIRECTLY TO THE NEAREST EMERGENCY ROOM IF YOU HAVE ANY OF THE FOLLOWING:      Difficulty breathing              Chills and/or fever over 101 F   Persistent vomiting and/or vomiting blood   Severe abdominal pain   Severe chest pain   Black, tarry stools   Bleeding- more than one tablespoon   Any other symptom or condition that you feel may need urgent attention  Your doctor recommends these additional instructions:  If any biopsies were taken, your doctors clinic will contact you in 1 to 2   weeks with any results.  - Discharge patient to home (ambulatory).   - Await pathology results.   - Repeat upper endoscopy in 1 year for surveillance based on pathology   results.  For questions, problems or results please call your physician - Dimitri Leary MD.  EMERGENCY PHONE NUMBER: [phone],  LAB RESULTS: [phone]  IF A COMPLICATION OR EMERGENCY SITUATION ARISES AND YOU ARE UNABLE TO REACH   YOUR PHYSICIAN - GO DIRECTLY TO THE EMERGENCY ROOM.  Dimitri Leary MD  8/21/2020 8:32:38 AM  This report has been verified and signed electronically.  [employer]

## 2020-08-21 NOTE — DISCHARGE SUMMARY
Discharge Summary/Instructions after an Endoscopic Procedure    Patient Name: Devon Berry  Patient MRN: [id number]  Patient YOB: 1945 Friday, August 21, 2020  Dimitri Leary MD    Your health is very important to us during the Covid Crisis. Following your procedure today, you will receive a daily text for 2 weeks asking about signs or symptoms of Covid 19.  Please respond to this text when you receive it so we can follow up and keep you as safe as possible.     RESTRICTIONS:  During your procedure today, you received medications for sedation.  These medications may affect your judgment, balance and coordination.  Therefore, for 24 hours, you have the following restrictions:     - DO NOT drive a car, operate machinery, make legal/financial decisions, sign important papers or drink alcohol.      ACTIVITY:  Today: no heavy lifting, straining or running due to procedural sedation/anesthesia.  The following day: return to full activity including work.    DIET:  Eat and drink normally unless instructed otherwise.     TREATMENT FOR COMMON SIDE EFFECTS:  - Mild abdominal pain, nausea, belching, bloating or excessive gas:  rest, eat lightly and use a heating pad.  - Sore Throat: treat with throat lozenges and/or gargle with warm salt water.  - Because air was used during the procedure, expelling large amounts of air from your rectum or belching is normal.  - If a bowel prep was taken, you may not have a bowel movement for 1-3 days.  This is normal.      SYMPTOMS TO WATCH FOR AND REPORT TO YOUR PHYSICIAN:  1. Abdominal pain or bloating, other than gas cramps.  2. Chest pain.  3. Back pain.  4. Signs of infection such as: chills or fever occurring within 24 hours after the procedure.  5. Rectal bleeding, which would show as bright red, maroon, or black stools. (A tablespoon of blood from the rectum is not serious, especially if hemorrhoids are present.)  6. Vomiting.  7. Weakness or dizziness.      GO  DIRECTLY TO THE NEAREST EMERGENCY ROOM IF YOU HAVE ANY OF THE FOLLOWING:     Difficulty breathing              Chills and/or fever over 101 F   Persistent vomiting and/or vomiting blood   Severe abdominal pain   Severe chest pain   Black, tarry stools   Bleeding- more than one tablespoon   Any other symptom or condition that you feel may need urgent attention    Your doctor recommends these additional instructions:  If any biopsies were taken, your doctors clinic will contact you in 1 to 2 weeks with any results.    - Discharge patient to home (ambulatory).   - Await pathology results.   - Repeat upper endoscopy in 1 year for surveillance based on pathology results.    For questions, problems or results please call your physician - Dimitri Laery MD.    EMERGENCY PHONE NUMBER: [phone],  LAB RESULTS: [phone]    IF A COMPLICATION OR EMERGENCY SITUATION ARISES AND YOU ARE UNABLE TO REACH YOUR PHYSICIAN - GO DIRECTLY TO THE EMERGENCY ROOM.

## 2020-08-21 NOTE — ANESTHESIA POSTPROCEDURE EVALUATION
Anesthesia Post Evaluation    Patient: Devon Berry    Procedure(s) Performed: Procedure(s) (LRB):  EGD (ESOPHAGOGASTRODUODENOSCOPY) (N/A)    Final Anesthesia Type: MAC    Patient location during evaluation: GI PACU  Patient participation: Yes- Able to Participate  Level of consciousness: awake and alert  Post-procedure vital signs: reviewed and stable  Pain management: adequate  Airway patency: patent    PONV status at discharge: No PONV  Anesthetic complications: no      Cardiovascular status: blood pressure returned to baseline and hemodynamically stable  Respiratory status: unassisted, spontaneous ventilation and room air  Hydration status: euvolemic  Follow-up not needed.          Vitals Value Taken Time   /67 08/21/20 0835   Temp 98 08/21/20 0835   Pulse 75 08/21/20 0835   Resp 10 08/21/20 0835   SpO2 90 08/21/20 0835         No case tracking events are documented in the log.      Pain/Angel Score: No data recorded

## 2020-08-21 NOTE — TRANSFER OF CARE
Anesthesia Transfer of Care Note    Patient: Devon Berry    Procedure(s) Performed: Procedure(s) (LRB):  EGD (ESOPHAGOGASTRODUODENOSCOPY) (N/A)    Patient location: GI    Anesthesia Type: MAC    Transport from OR: Transported from OR on room air with adequate spontaneous ventilation    Post pain: adequate analgesia    Post assessment: no apparent anesthetic complications    Post vital signs: stable    Level of consciousness: awake    Nausea/Vomiting: no nausea/vomiting    Complications: none    Transfer of care protocol was followed      Last vitals:   Visit Vitals  /62 (BP Location: Left arm, Patient Position: Lying)   Pulse (!) 59   Temp 36.7 °C (98.1 °F) (Temporal)   Resp 20   Ht 6' (1.829 m)   Wt 117.9 kg (260 lb)   SpO2 95%   BMI 35.26 kg/m²

## 2020-08-25 ENCOUNTER — TELEPHONE (OUTPATIENT)
Dept: ENDOSCOPY | Facility: HOSPITAL | Age: 75
End: 2020-08-25

## 2020-08-25 LAB
FINAL PATHOLOGIC DIAGNOSIS: NORMAL
GROSS: NORMAL
SUPPLEMENTAL DIAGNOSIS: NORMAL

## 2020-08-25 NOTE — TELEPHONE ENCOUNTER
----- Message from Dimitri Leary MD sent at 8/25/2020  7:51 AM CDT -----  Please let the patient know the biopsy of the esophagus showed small amount of Horner's without dysplasia. EGD in 6 months with RFA.

## 2020-08-25 NOTE — TELEPHONE ENCOUNTER
----- Message from Jing Castro sent at 8/25/2020  1:28 PM CDT -----  Regarding: PT  Contact: PT's Wife  PT's wife returned a missed call from Shaniqua. Please call back     Callback: [phone]

## 2020-08-25 NOTE — TELEPHONE ENCOUNTER
Dimitri Leary MD   8/25/2020  7:51 AM      Please let the patient know the biopsy of the esophagus showed small amount of Horner's without dysplasia. EGD in 6 months with RFA.     Patient informed.

## 2020-08-26 ENCOUNTER — TELEPHONE (OUTPATIENT)
Dept: GASTROENTEROLOGY | Facility: CLINIC | Age: 75
End: 2020-08-26

## 2020-08-26 NOTE — TELEPHONE ENCOUNTER
Post procedure Instructions: Repeat upper endoscopy in 1 year for surveillance based on pathology results. Please place on recall.

## 2021-01-08 ENCOUNTER — OFFICE VISIT (OUTPATIENT)
Dept: URGENT CARE | Facility: CLINIC | Age: 76
End: 2021-01-08
Payer: MEDICARE

## 2021-01-08 VITALS
BODY MASS INDEX: 36.57 KG/M2 | WEIGHT: 269.63 LBS | OXYGEN SATURATION: 97 % | HEART RATE: 74 BPM | TEMPERATURE: 97 F | SYSTOLIC BLOOD PRESSURE: 124 MMHG | DIASTOLIC BLOOD PRESSURE: 84 MMHG

## 2021-01-08 DIAGNOSIS — R51.9 SINUS HEADACHE: Primary | ICD-10-CM

## 2021-01-08 DIAGNOSIS — Z20.822 CLOSE EXPOSURE TO COVID-19 VIRUS: ICD-10-CM

## 2021-01-08 DIAGNOSIS — R53.83 FATIGUE, UNSPECIFIED TYPE: ICD-10-CM

## 2021-01-08 DIAGNOSIS — R50.9 FEVER AND CHILLS: ICD-10-CM

## 2021-01-08 LAB
CTP QC/QA: YES
SARS-COV-2 RDRP RESP QL NAA+PROBE: NEGATIVE

## 2021-01-08 PROCEDURE — 1159F PR MEDICATION LIST DOCUMENTED IN MEDICAL RECORD: ICD-10-PCS | Mod: S$GLB,,, | Performed by: NURSE PRACTITIONER

## 2021-01-08 PROCEDURE — U0002 COVID-19 LAB TEST NON-CDC: HCPCS | Mod: QW,S$GLB,, | Performed by: NURSE PRACTITIONER

## 2021-01-08 PROCEDURE — 1126F AMNT PAIN NOTED NONE PRSNT: CPT | Mod: S$GLB,,, | Performed by: NURSE PRACTITIONER

## 2021-01-08 PROCEDURE — 99999 PR PBB SHADOW E&M-EST. PATIENT-LVL III: ICD-10-PCS | Mod: PBBFAC,,, | Performed by: NURSE PRACTITIONER

## 2021-01-08 PROCEDURE — 3288F FALL RISK ASSESSMENT DOCD: CPT | Mod: CPTII,S$GLB,, | Performed by: NURSE PRACTITIONER

## 2021-01-08 PROCEDURE — 1126F PR PAIN SEVERITY QUANTIFIED, NO PAIN PRESENT: ICD-10-PCS | Mod: S$GLB,,, | Performed by: NURSE PRACTITIONER

## 2021-01-08 PROCEDURE — 99999 PR PBB SHADOW E&M-EST. PATIENT-LVL III: CPT | Mod: PBBFAC,,, | Performed by: NURSE PRACTITIONER

## 2021-01-08 PROCEDURE — 3288F PR FALLS RISK ASSESSMENT DOCUMENTED: ICD-10-PCS | Mod: CPTII,S$GLB,, | Performed by: NURSE PRACTITIONER

## 2021-01-08 PROCEDURE — 1101F PT FALLS ASSESS-DOCD LE1/YR: CPT | Mod: CPTII,S$GLB,, | Performed by: NURSE PRACTITIONER

## 2021-01-08 PROCEDURE — 99213 PR OFFICE/OUTPT VISIT, EST, LEVL III, 20-29 MIN: ICD-10-PCS | Mod: S$GLB,CS,, | Performed by: NURSE PRACTITIONER

## 2021-01-08 PROCEDURE — 1159F MED LIST DOCD IN RCRD: CPT | Mod: S$GLB,,, | Performed by: NURSE PRACTITIONER

## 2021-01-08 PROCEDURE — 99213 OFFICE O/P EST LOW 20 MIN: CPT | Mod: S$GLB,CS,, | Performed by: NURSE PRACTITIONER

## 2021-01-08 PROCEDURE — 1101F PR PT FALLS ASSESS DOC 0-1 FALLS W/OUT INJ PAST YR: ICD-10-PCS | Mod: CPTII,S$GLB,, | Performed by: NURSE PRACTITIONER

## 2021-01-08 PROCEDURE — U0002: ICD-10-PCS | Mod: QW,S$GLB,, | Performed by: NURSE PRACTITIONER

## 2021-04-05 ENCOUNTER — TELEPHONE (OUTPATIENT)
Dept: ENDOSCOPY | Facility: HOSPITAL | Age: 76
End: 2021-04-05

## 2021-04-06 DIAGNOSIS — R10.9 ABDOMINAL PAIN, UNSPECIFIED ABDOMINAL LOCATION: Primary | ICD-10-CM

## 2021-04-07 ENCOUNTER — TELEPHONE (OUTPATIENT)
Dept: ENDOSCOPY | Facility: HOSPITAL | Age: 76
End: 2021-04-07

## 2021-04-09 ENCOUNTER — ANESTHESIA EVENT (OUTPATIENT)
Dept: ENDOSCOPY | Facility: HOSPITAL | Age: 76
End: 2021-04-09
Payer: MEDICARE

## 2021-04-09 ENCOUNTER — HOSPITAL ENCOUNTER (OUTPATIENT)
Facility: HOSPITAL | Age: 76
Discharge: HOME | End: 2021-04-09
Attending: INTERNAL MEDICINE | Admitting: INTERNAL MEDICINE
Payer: MEDICARE

## 2021-04-09 ENCOUNTER — ANESTHESIA (OUTPATIENT)
Dept: ENDOSCOPY | Facility: HOSPITAL | Age: 76
End: 2021-04-09
Payer: MEDICARE

## 2021-04-09 VITALS
SYSTOLIC BLOOD PRESSURE: 150 MMHG | TEMPERATURE: 98 F | BODY MASS INDEX: 34.13 KG/M2 | HEART RATE: 56 BPM | RESPIRATION RATE: 17 BRPM | DIASTOLIC BLOOD PRESSURE: 70 MMHG | HEIGHT: 72 IN | OXYGEN SATURATION: 95 % | WEIGHT: 252 LBS

## 2021-04-09 DIAGNOSIS — K22.719 BARRETT'S ESOPHAGUS WITH DYSPLASIA: Primary | ICD-10-CM

## 2021-04-09 LAB — SARS-COV-2 RDRP RESP QL NAA+PROBE: NEGATIVE

## 2021-04-09 PROCEDURE — 88305 TISSUE EXAM BY PATHOLOGIST: CPT | Mod: 26,,, | Performed by: PATHOLOGY

## 2021-04-09 PROCEDURE — 88305 TISSUE EXAM BY PATHOLOGIST: ICD-10-PCS | Mod: 26,,, | Performed by: PATHOLOGY

## 2021-04-09 PROCEDURE — 43239 PR EGD, FLEX, W/BIOPSY, SGL/MULTI: ICD-10-PCS | Mod: ,,, | Performed by: INTERNAL MEDICINE

## 2021-04-09 PROCEDURE — 43239 EGD BIOPSY SINGLE/MULTIPLE: CPT | Performed by: INTERNAL MEDICINE

## 2021-04-09 PROCEDURE — 25000003 PHARM REV CODE 250: Performed by: INTERNAL MEDICINE

## 2021-04-09 PROCEDURE — 43239 EGD BIOPSY SINGLE/MULTIPLE: CPT | Mod: ,,, | Performed by: INTERNAL MEDICINE

## 2021-04-09 PROCEDURE — 63600175 PHARM REV CODE 636 W HCPCS: Performed by: NURSE ANESTHETIST, CERTIFIED REGISTERED

## 2021-04-09 PROCEDURE — 37000008 HC ANESTHESIA 1ST 15 MINUTES: Performed by: INTERNAL MEDICINE

## 2021-04-09 PROCEDURE — U0002 COVID-19 LAB TEST NON-CDC: HCPCS | Performed by: INTERNAL MEDICINE

## 2021-04-09 PROCEDURE — 88305 TISSUE EXAM BY PATHOLOGIST: CPT | Performed by: PATHOLOGY

## 2021-04-09 PROCEDURE — 25000003 PHARM REV CODE 250: Performed by: NURSE ANESTHETIST, CERTIFIED REGISTERED

## 2021-04-09 PROCEDURE — 37000009 HC ANESTHESIA EA ADD 15 MINS: Performed by: INTERNAL MEDICINE

## 2021-04-09 PROCEDURE — 27201012 HC FORCEPS, HOT/COLD, DISP: Performed by: INTERNAL MEDICINE

## 2021-04-09 RX ORDER — SODIUM CHLORIDE 0.9 % (FLUSH) 0.9 %
10 SYRINGE (ML) INJECTION
Status: DISCONTINUED | OUTPATIENT
Start: 2021-04-09 | End: 2021-04-09 | Stop reason: HOSPADM

## 2021-04-09 RX ORDER — PROPOFOL 10 MG/ML
VIAL (ML) INTRAVENOUS
Status: DISCONTINUED | OUTPATIENT
Start: 2021-04-09 | End: 2021-04-09

## 2021-04-09 RX ORDER — LIDOCAINE HYDROCHLORIDE 20 MG/ML
INJECTION INTRAVENOUS
Status: DISCONTINUED | OUTPATIENT
Start: 2021-04-09 | End: 2021-04-09

## 2021-04-09 RX ORDER — SODIUM CHLORIDE 9 MG/ML
INJECTION, SOLUTION INTRAVENOUS CONTINUOUS
Status: DISCONTINUED | OUTPATIENT
Start: 2021-04-09 | End: 2021-04-09 | Stop reason: HOSPADM

## 2021-04-09 RX ORDER — PROPOFOL 10 MG/ML
VIAL (ML) INTRAVENOUS CONTINUOUS PRN
Status: DISCONTINUED | OUTPATIENT
Start: 2021-04-09 | End: 2021-04-09

## 2021-04-09 RX ADMIN — PROPOFOL 150 MCG/KG/MIN: 10 INJECTION, EMULSION INTRAVENOUS at 01:04

## 2021-04-09 RX ADMIN — LIDOCAINE HYDROCHLORIDE 60 MG: 20 INJECTION, SOLUTION INTRAVENOUS at 01:04

## 2021-04-09 RX ADMIN — PROPOFOL 60 MG: 10 INJECTION, EMULSION INTRAVENOUS at 01:04

## 2021-04-09 RX ADMIN — SODIUM CHLORIDE: 0.9 INJECTION, SOLUTION INTRAVENOUS at 11:04

## 2021-04-12 ENCOUNTER — TELEPHONE (OUTPATIENT)
Dept: ENDOSCOPY | Facility: HOSPITAL | Age: 76
End: 2021-04-12

## 2021-04-12 DIAGNOSIS — R10.9 ABDOMINAL PAIN, UNSPECIFIED ABDOMINAL LOCATION: ICD-10-CM

## 2021-04-12 LAB
FINAL PATHOLOGIC DIAGNOSIS: NORMAL
GROSS: NORMAL
Lab: NORMAL

## 2021-04-13 ENCOUNTER — TELEPHONE (OUTPATIENT)
Dept: ENDOSCOPY | Facility: HOSPITAL | Age: 76
End: 2021-04-13

## 2021-04-13 ENCOUNTER — PATIENT MESSAGE (OUTPATIENT)
Dept: ENDOSCOPY | Facility: HOSPITAL | Age: 76
End: 2021-04-13

## 2021-04-14 ENCOUNTER — TELEPHONE (OUTPATIENT)
Dept: GASTROENTEROLOGY | Facility: CLINIC | Age: 76
End: 2021-04-14

## 2021-04-20 ENCOUNTER — HOSPITAL ENCOUNTER (OUTPATIENT)
Dept: RADIOLOGY | Facility: HOSPITAL | Age: 76
Discharge: HOME | End: 2021-04-20
Attending: INTERNAL MEDICINE
Payer: MEDICARE

## 2021-04-20 DIAGNOSIS — R10.9 ABDOMINAL PAIN, UNSPECIFIED ABDOMINAL LOCATION: ICD-10-CM

## 2021-04-20 PROCEDURE — A9698 NON-RAD CONTRAST MATERIALNOC: HCPCS | Performed by: INTERNAL MEDICINE

## 2021-04-20 PROCEDURE — 25500020 PHARM REV CODE 255: Performed by: INTERNAL MEDICINE

## 2021-04-20 PROCEDURE — 74160 CT ABDOMEN W/CONTRAST: CPT | Mod: TC

## 2021-04-20 RX ADMIN — IOHEXOL 1000 ML: 9 SOLUTION ORAL at 12:04

## 2021-04-20 RX ADMIN — IOHEXOL 100 ML: 350 INJECTION, SOLUTION INTRAVENOUS at 11:04

## 2021-04-21 DIAGNOSIS — R10.9 ABDOMINAL PAIN, UNSPECIFIED ABDOMINAL LOCATION: ICD-10-CM

## 2021-04-23 ENCOUNTER — TELEPHONE (OUTPATIENT)
Dept: ENDOSCOPY | Facility: HOSPITAL | Age: 76
End: 2021-04-23

## 2021-04-29 ENCOUNTER — HOSPITAL ENCOUNTER (OUTPATIENT)
Facility: HOSPITAL | Age: 76
Discharge: HOME | End: 2021-04-30
Attending: EMERGENCY MEDICINE | Admitting: SURGERY
Payer: MEDICARE

## 2021-04-29 ENCOUNTER — TELEPHONE (OUTPATIENT)
Dept: ENDOSCOPY | Facility: HOSPITAL | Age: 76
End: 2021-04-29

## 2021-04-29 ENCOUNTER — ANESTHESIA EVENT (OUTPATIENT)
Dept: SURGERY | Facility: HOSPITAL | Age: 76
End: 2021-04-29
Payer: MEDICARE

## 2021-04-29 ENCOUNTER — HOSPITAL ENCOUNTER (OUTPATIENT)
Dept: RADIOLOGY | Facility: HOSPITAL | Age: 76
Discharge: HOME | End: 2021-04-29
Attending: INTERNAL MEDICINE
Payer: MEDICARE

## 2021-04-29 ENCOUNTER — ANESTHESIA (OUTPATIENT)
Dept: SURGERY | Facility: HOSPITAL | Age: 76
End: 2021-04-29
Payer: MEDICARE

## 2021-04-29 DIAGNOSIS — K35.80 ACUTE APPENDICITIS, UNSPECIFIED ACUTE APPENDICITIS TYPE: Primary | ICD-10-CM

## 2021-04-29 DIAGNOSIS — K37 APPENDICITIS: ICD-10-CM

## 2021-04-29 DIAGNOSIS — R10.9 ABDOMINAL PAIN, UNSPECIFIED ABDOMINAL LOCATION: ICD-10-CM

## 2021-04-29 LAB
ALBUMIN SERPL BCP-MCNC: 3.5 G/DL (ref 3.5–5.2)
ALP SERPL-CCNC: 47 U/L (ref 55–135)
ALT SERPL W/O P-5'-P-CCNC: 13 U/L (ref 10–44)
ANION GAP SERPL CALC-SCNC: 8 MMOL/L (ref 8–16)
APTT BLDCRRT: 25.1 SEC (ref 21–32)
AST SERPL-CCNC: 16 U/L (ref 10–40)
BASOPHILS # BLD AUTO: 0.07 K/UL (ref 0–0.2)
BASOPHILS NFR BLD: 0.6 % (ref 0–1.9)
BILIRUB SERPL-MCNC: 0.8 MG/DL (ref 0.1–1)
BUN SERPL-MCNC: 26 MG/DL (ref 8–23)
CALCIUM SERPL-MCNC: 8.5 MG/DL (ref 8.7–10.5)
CHLORIDE SERPL-SCNC: 111 MMOL/L (ref 95–110)
CO2 SERPL-SCNC: 22 MMOL/L (ref 23–29)
CREAT SERPL-MCNC: 0.8 MG/DL (ref 0.5–1.4)
CTP QC/QA: YES
DIFFERENTIAL METHOD: ABNORMAL
EOSINOPHIL # BLD AUTO: 0.2 K/UL (ref 0–0.5)
EOSINOPHIL NFR BLD: 1.5 % (ref 0–8)
ERYTHROCYTE [DISTWIDTH] IN BLOOD BY AUTOMATED COUNT: 14.4 % (ref 11.5–14.5)
EST. GFR  (AFRICAN AMERICAN): >60 ML/MIN/1.73 M^2
EST. GFR  (NON AFRICAN AMERICAN): >60 ML/MIN/1.73 M^2
GLUCOSE SERPL-MCNC: 89 MG/DL (ref 70–110)
HCT VFR BLD AUTO: 43.3 % (ref 40–54)
HCV AB SERPL QL IA: NEGATIVE
HGB BLD-MCNC: 14.4 G/DL (ref 14–18)
IMM GRANULOCYTES # BLD AUTO: 0.06 K/UL (ref 0–0.04)
IMM GRANULOCYTES NFR BLD AUTO: 0.5 % (ref 0–0.5)
INR PPP: 1 (ref 0.8–1.2)
LYMPHOCYTES # BLD AUTO: 2.4 K/UL (ref 1–4.8)
LYMPHOCYTES NFR BLD: 20.1 % (ref 18–48)
MCH RBC QN AUTO: 31.4 PG (ref 27–31)
MCHC RBC AUTO-ENTMCNC: 33.3 G/DL (ref 32–36)
MCV RBC AUTO: 95 FL (ref 82–98)
MONOCYTES # BLD AUTO: 0.6 K/UL (ref 0.3–1)
MONOCYTES NFR BLD: 4.7 % (ref 4–15)
NEUTROPHILS # BLD AUTO: 8.8 K/UL (ref 1.8–7.7)
NEUTROPHILS NFR BLD: 72.6 % (ref 38–73)
NRBC BLD-RTO: 0 /100 WBC
PLATELET # BLD AUTO: 173 K/UL (ref 150–450)
PMV BLD AUTO: 11.4 FL (ref 9.2–12.9)
POTASSIUM SERPL-SCNC: 4.3 MMOL/L (ref 3.5–5.1)
PROT SERPL-MCNC: 6.3 G/DL (ref 6–8.4)
PROTHROMBIN TIME: 10.8 SEC (ref 9–12.5)
RBC # BLD AUTO: 4.58 M/UL (ref 4.6–6.2)
SARS-COV-2 RDRP RESP QL NAA+PROBE: NEGATIVE
SODIUM SERPL-SCNC: 141 MMOL/L (ref 136–145)
WBC # BLD AUTO: 12.07 K/UL (ref 3.9–12.7)

## 2021-04-29 PROCEDURE — 25000003 PHARM REV CODE 250: Performed by: SURGERY

## 2021-04-29 PROCEDURE — A9698 NON-RAD CONTRAST MATERIALNOC: HCPCS | Performed by: INTERNAL MEDICINE

## 2021-04-29 PROCEDURE — 74177 CT ABD & PELVIS W/CONTRAST: CPT | Mod: TC

## 2021-04-29 PROCEDURE — 63600175 PHARM REV CODE 636 W HCPCS: Performed by: SURGERY

## 2021-04-29 PROCEDURE — U0002 COVID-19 LAB TEST NON-CDC: HCPCS | Performed by: EMERGENCY MEDICINE

## 2021-04-29 PROCEDURE — 37000009 HC ANESTHESIA EA ADD 15 MINS: Performed by: SURGERY

## 2021-04-29 PROCEDURE — 88304 PR  SURG PATH,LEVEL III: ICD-10-PCS | Mod: 26,,, | Performed by: PATHOLOGY

## 2021-04-29 PROCEDURE — 88304 TISSUE EXAM BY PATHOLOGIST: CPT | Mod: 26,,, | Performed by: PATHOLOGY

## 2021-04-29 PROCEDURE — 63600175 PHARM REV CODE 636 W HCPCS: Performed by: NURSE ANESTHETIST, CERTIFIED REGISTERED

## 2021-04-29 PROCEDURE — 27201423 OPTIME MED/SURG SUP & DEVICES STERILE SUPPLY: Performed by: SURGERY

## 2021-04-29 PROCEDURE — 36000709 HC OR TIME LEV III EA ADD 15 MIN: Performed by: SURGERY

## 2021-04-29 PROCEDURE — 85610 PROTHROMBIN TIME: CPT | Performed by: EMERGENCY MEDICINE

## 2021-04-29 PROCEDURE — 25500020 PHARM REV CODE 255: Performed by: INTERNAL MEDICINE

## 2021-04-29 PROCEDURE — 93010 ELECTROCARDIOGRAM REPORT: CPT | Mod: ,,, | Performed by: INTERNAL MEDICINE

## 2021-04-29 PROCEDURE — 25000003 PHARM REV CODE 250: Performed by: EMERGENCY MEDICINE

## 2021-04-29 PROCEDURE — 99204 OFFICE O/P NEW MOD 45 MIN: CPT | Mod: 57,,, | Performed by: SURGERY

## 2021-04-29 PROCEDURE — 36000708 HC OR TIME LEV III 1ST 15 MIN: Performed by: SURGERY

## 2021-04-29 PROCEDURE — 25000003 PHARM REV CODE 250: Performed by: NURSE ANESTHETIST, CERTIFIED REGISTERED

## 2021-04-29 PROCEDURE — 85730 THROMBOPLASTIN TIME PARTIAL: CPT | Performed by: EMERGENCY MEDICINE

## 2021-04-29 PROCEDURE — 99285 EMERGENCY DEPT VISIT HI MDM: CPT | Mod: 25

## 2021-04-29 PROCEDURE — 44970 PR LAP,APPENDECTOMY: ICD-10-PCS | Mod: ,,, | Performed by: SURGERY

## 2021-04-29 PROCEDURE — 86803 HEPATITIS C AB TEST: CPT | Performed by: EMERGENCY MEDICINE

## 2021-04-29 PROCEDURE — 63600175 PHARM REV CODE 636 W HCPCS: Performed by: ANESTHESIOLOGY

## 2021-04-29 PROCEDURE — 85025 COMPLETE CBC W/AUTO DIFF WBC: CPT | Performed by: EMERGENCY MEDICINE

## 2021-04-29 PROCEDURE — 44970 LAPAROSCOPY APPENDECTOMY: CPT | Mod: ,,, | Performed by: SURGERY

## 2021-04-29 PROCEDURE — 37000008 HC ANESTHESIA 1ST 15 MINUTES: Performed by: SURGERY

## 2021-04-29 PROCEDURE — 80053 COMPREHEN METABOLIC PANEL: CPT | Performed by: EMERGENCY MEDICINE

## 2021-04-29 PROCEDURE — 93005 ELECTROCARDIOGRAM TRACING: CPT

## 2021-04-29 PROCEDURE — 96375 TX/PRO/DX INJ NEW DRUG ADDON: CPT | Mod: 59

## 2021-04-29 PROCEDURE — 99204 PR OFFICE/OUTPT VISIT, NEW, LEVL IV, 45-59 MIN: ICD-10-PCS | Mod: 57,,, | Performed by: SURGERY

## 2021-04-29 PROCEDURE — 63600175 PHARM REV CODE 636 W HCPCS: Performed by: EMERGENCY MEDICINE

## 2021-04-29 PROCEDURE — 71000033 HC RECOVERY, INTIAL HOUR: Performed by: SURGERY

## 2021-04-29 PROCEDURE — 96365 THER/PROPH/DIAG IV INF INIT: CPT

## 2021-04-29 PROCEDURE — 88304 TISSUE EXAM BY PATHOLOGIST: CPT | Performed by: PATHOLOGY

## 2021-04-29 PROCEDURE — 93010 EKG 12-LEAD: ICD-10-PCS | Mod: ,,, | Performed by: INTERNAL MEDICINE

## 2021-04-29 RX ORDER — ONDANSETRON 2 MG/ML
INJECTION INTRAMUSCULAR; INTRAVENOUS
Status: DISCONTINUED | OUTPATIENT
Start: 2021-04-29 | End: 2021-04-29

## 2021-04-29 RX ORDER — LIDOCAINE HYDROCHLORIDE 10 MG/ML
INJECTION, SOLUTION EPIDURAL; INFILTRATION; INTRACAUDAL; PERINEURAL
Status: DISCONTINUED | OUTPATIENT
Start: 2021-04-29 | End: 2021-04-29 | Stop reason: HOSPADM

## 2021-04-29 RX ORDER — MIDAZOLAM HYDROCHLORIDE 1 MG/ML
INJECTION, SOLUTION INTRAMUSCULAR; INTRAVENOUS
Status: DISCONTINUED | OUTPATIENT
Start: 2021-04-29 | End: 2021-04-29

## 2021-04-29 RX ORDER — TALC
9 POWDER (GRAM) TOPICAL NIGHTLY PRN
Status: DISCONTINUED | OUTPATIENT
Start: 2021-04-29 | End: 2021-04-30 | Stop reason: HOSPADM

## 2021-04-29 RX ORDER — MORPHINE SULFATE 4 MG/ML
2 INJECTION, SOLUTION INTRAMUSCULAR; INTRAVENOUS
Status: DISCONTINUED | OUTPATIENT
Start: 2021-04-29 | End: 2021-04-30 | Stop reason: HOSPADM

## 2021-04-29 RX ORDER — FENTANYL CITRATE 50 UG/ML
25 INJECTION, SOLUTION INTRAMUSCULAR; INTRAVENOUS EVERY 5 MIN PRN
Status: DISCONTINUED | OUTPATIENT
Start: 2021-04-29 | End: 2021-04-30

## 2021-04-29 RX ORDER — LIDOCAINE HYDROCHLORIDE 10 MG/ML
INJECTION, SOLUTION EPIDURAL; INFILTRATION; INTRACAUDAL; PERINEURAL
Status: DISCONTINUED | OUTPATIENT
Start: 2021-04-29 | End: 2021-04-29

## 2021-04-29 RX ORDER — LIDOCAINE HYDROCHLORIDE 10 MG/ML
1 INJECTION, SOLUTION EPIDURAL; INFILTRATION; INTRACAUDAL; PERINEURAL ONCE
Status: DISCONTINUED | OUTPATIENT
Start: 2021-04-29 | End: 2021-04-30 | Stop reason: HOSPADM

## 2021-04-29 RX ORDER — ACETAMINOPHEN 325 MG/1
650 TABLET ORAL EVERY 4 HOURS PRN
Status: DISCONTINUED | OUTPATIENT
Start: 2021-04-29 | End: 2021-04-30 | Stop reason: HOSPADM

## 2021-04-29 RX ORDER — SODIUM CHLORIDE, SODIUM LACTATE, POTASSIUM CHLORIDE, CALCIUM CHLORIDE 600; 310; 30; 20 MG/100ML; MG/100ML; MG/100ML; MG/100ML
INJECTION, SOLUTION INTRAVENOUS CONTINUOUS
Status: DISCONTINUED | OUTPATIENT
Start: 2021-04-29 | End: 2021-04-30

## 2021-04-29 RX ORDER — ONDANSETRON 2 MG/ML
4 INJECTION INTRAMUSCULAR; INTRAVENOUS
Status: COMPLETED | OUTPATIENT
Start: 2021-04-29 | End: 2021-04-29

## 2021-04-29 RX ORDER — PROPOFOL 10 MG/ML
VIAL (ML) INTRAVENOUS
Status: DISCONTINUED | OUTPATIENT
Start: 2021-04-29 | End: 2021-04-29

## 2021-04-29 RX ORDER — BUPIVACAINE HYDROCHLORIDE 5 MG/ML
INJECTION, SOLUTION EPIDURAL; INTRACAUDAL
Status: DISCONTINUED | OUTPATIENT
Start: 2021-04-29 | End: 2021-04-29 | Stop reason: HOSPADM

## 2021-04-29 RX ORDER — NEOSTIGMINE METHYLSULFATE 1 MG/ML
INJECTION, SOLUTION INTRAVENOUS
Status: DISCONTINUED | OUTPATIENT
Start: 2021-04-29 | End: 2021-04-29

## 2021-04-29 RX ORDER — EPHEDRINE SULFATE 50 MG/ML
INJECTION, SOLUTION INTRAVENOUS
Status: DISCONTINUED | OUTPATIENT
Start: 2021-04-29 | End: 2021-04-29

## 2021-04-29 RX ORDER — DIPHENHYDRAMINE HYDROCHLORIDE 50 MG/ML
25 INJECTION INTRAMUSCULAR; INTRAVENOUS EVERY 6 HOURS PRN
Status: DISCONTINUED | OUTPATIENT
Start: 2021-04-29 | End: 2021-04-30

## 2021-04-29 RX ORDER — FENTANYL CITRATE 50 UG/ML
INJECTION, SOLUTION INTRAMUSCULAR; INTRAVENOUS
Status: DISCONTINUED | OUTPATIENT
Start: 2021-04-29 | End: 2021-04-29

## 2021-04-29 RX ORDER — ALBUTEROL SULFATE 0.83 MG/ML
2.5 SOLUTION RESPIRATORY (INHALATION) EVERY 4 HOURS PRN
Status: DISCONTINUED | OUTPATIENT
Start: 2021-04-29 | End: 2021-04-30 | Stop reason: HOSPADM

## 2021-04-29 RX ORDER — ROCURONIUM BROMIDE 10 MG/ML
INJECTION, SOLUTION INTRAVENOUS
Status: DISCONTINUED | OUTPATIENT
Start: 2021-04-29 | End: 2021-04-29

## 2021-04-29 RX ORDER — ACETAMINOPHEN 10 MG/ML
1000 INJECTION, SOLUTION INTRAVENOUS ONCE
Status: DISCONTINUED | OUTPATIENT
Start: 2021-04-29 | End: 2021-04-30 | Stop reason: HOSPADM

## 2021-04-29 RX ORDER — HYDROCODONE BITARTRATE AND ACETAMINOPHEN 5; 325 MG/1; MG/1
2 TABLET ORAL EVERY 4 HOURS PRN
Status: DISCONTINUED | OUTPATIENT
Start: 2021-04-29 | End: 2021-04-30 | Stop reason: HOSPADM

## 2021-04-29 RX ORDER — MEPERIDINE HYDROCHLORIDE 25 MG/ML
12.5 INJECTION INTRAMUSCULAR; INTRAVENOUS; SUBCUTANEOUS ONCE
Status: DISCONTINUED | OUTPATIENT
Start: 2021-04-29 | End: 2021-04-30

## 2021-04-29 RX ORDER — SUCCINYLCHOLINE CHLORIDE 20 MG/ML
INJECTION INTRAMUSCULAR; INTRAVENOUS
Status: DISCONTINUED | OUTPATIENT
Start: 2021-04-29 | End: 2021-04-29

## 2021-04-29 RX ORDER — ONDANSETRON 2 MG/ML
4 INJECTION INTRAMUSCULAR; INTRAVENOUS EVERY 8 HOURS PRN
Status: DISCONTINUED | OUTPATIENT
Start: 2021-04-29 | End: 2021-04-30 | Stop reason: HOSPADM

## 2021-04-29 RX ADMIN — PROPOFOL 120 MG: 10 INJECTION, EMULSION INTRAVENOUS at 08:04

## 2021-04-29 RX ADMIN — LIDOCAINE HYDROCHLORIDE 50 MG: 10 INJECTION, SOLUTION EPIDURAL; INFILTRATION; INTRACAUDAL; PERINEURAL at 08:04

## 2021-04-29 RX ADMIN — FENTANYL CITRATE 50 MCG: 50 INJECTION, SOLUTION INTRAMUSCULAR; INTRAVENOUS at 09:04

## 2021-04-29 RX ADMIN — HYDROCODONE BITARTRATE AND ACETAMINOPHEN 2 TABLET: 5; 325 TABLET ORAL at 11:04

## 2021-04-29 RX ADMIN — IOHEXOL 1000 ML: 9 SOLUTION ORAL at 09:04

## 2021-04-29 RX ADMIN — SODIUM CHLORIDE, SODIUM LACTATE, POTASSIUM CHLORIDE, AND CALCIUM CHLORIDE: .6; .31; .03; .02 INJECTION, SOLUTION INTRAVENOUS at 06:04

## 2021-04-29 RX ADMIN — MIDAZOLAM HYDROCHLORIDE 2 MG: 1 INJECTION, SOLUTION INTRAMUSCULAR; INTRAVENOUS at 08:04

## 2021-04-29 RX ADMIN — FENTANYL CITRATE 25 MCG: 50 INJECTION, SOLUTION INTRAMUSCULAR; INTRAVENOUS at 11:04

## 2021-04-29 RX ADMIN — ONDANSETRON 4 MG: 2 INJECTION, SOLUTION INTRAMUSCULAR; INTRAVENOUS at 09:04

## 2021-04-29 RX ADMIN — FENTANYL CITRATE 25 MCG: 50 INJECTION, SOLUTION INTRAMUSCULAR; INTRAVENOUS at 10:04

## 2021-04-29 RX ADMIN — EPHEDRINE SULFATE 30 MG: 50 INJECTION INTRAVENOUS at 10:04

## 2021-04-29 RX ADMIN — GLYCOPYRROLATE 0.4 MG: 0.2 INJECTION, SOLUTION INTRAMUSCULAR; INTRAVENOUS at 10:04

## 2021-04-29 RX ADMIN — EPHEDRINE SULFATE 20 MG: 50 INJECTION INTRAVENOUS at 10:04

## 2021-04-29 RX ADMIN — ONDANSETRON 4 MG: 2 INJECTION INTRAMUSCULAR; INTRAVENOUS at 05:04

## 2021-04-29 RX ADMIN — IOHEXOL 100 ML: 350 INJECTION, SOLUTION INTRAVENOUS at 10:04

## 2021-04-29 RX ADMIN — ROCURONIUM BROMIDE 30 MG: 10 INJECTION, SOLUTION INTRAVENOUS at 09:04

## 2021-04-29 RX ADMIN — SUCCINYLCHOLINE CHLORIDE 120 MG: 20 INJECTION, SOLUTION INTRAMUSCULAR; INTRAVENOUS at 08:04

## 2021-04-29 RX ADMIN — NEOSTIGMINE METHYLSULFATE 3 MG: 1 INJECTION INTRAVENOUS at 10:04

## 2021-04-29 RX ADMIN — PIPERACILLIN AND TAZOBACTAM 4.5 G: 4; .5 INJECTION, POWDER, LYOPHILIZED, FOR SOLUTION INTRAVENOUS; PARENTERAL at 05:04

## 2021-04-30 VITALS
HEART RATE: 60 BPM | SYSTOLIC BLOOD PRESSURE: 135 MMHG | DIASTOLIC BLOOD PRESSURE: 74 MMHG | RESPIRATION RATE: 18 BRPM | HEIGHT: 71 IN | TEMPERATURE: 98 F | BODY MASS INDEX: 36.65 KG/M2 | WEIGHT: 261.81 LBS | OXYGEN SATURATION: 92 %

## 2021-04-30 LAB
BASOPHILS # BLD AUTO: 0.02 K/UL (ref 0–0.2)
BASOPHILS NFR BLD: 0.2 % (ref 0–1.9)
DIFFERENTIAL METHOD: ABNORMAL
EOSINOPHIL # BLD AUTO: 0 K/UL (ref 0–0.5)
EOSINOPHIL NFR BLD: 0 % (ref 0–8)
ERYTHROCYTE [DISTWIDTH] IN BLOOD BY AUTOMATED COUNT: 14.3 % (ref 11.5–14.5)
HCT VFR BLD AUTO: 43.5 % (ref 40–54)
HGB BLD-MCNC: 13.9 G/DL (ref 14–18)
IMM GRANULOCYTES # BLD AUTO: 0.06 K/UL (ref 0–0.04)
IMM GRANULOCYTES NFR BLD AUTO: 0.5 % (ref 0–0.5)
LYMPHOCYTES # BLD AUTO: 2 K/UL (ref 1–4.8)
LYMPHOCYTES NFR BLD: 16.2 % (ref 18–48)
MCH RBC QN AUTO: 31.2 PG (ref 27–31)
MCHC RBC AUTO-ENTMCNC: 32 G/DL (ref 32–36)
MCV RBC AUTO: 98 FL (ref 82–98)
MONOCYTES # BLD AUTO: 0.6 K/UL (ref 0.3–1)
MONOCYTES NFR BLD: 4.7 % (ref 4–15)
NEUTROPHILS # BLD AUTO: 9.8 K/UL (ref 1.8–7.7)
NEUTROPHILS NFR BLD: 78.4 % (ref 38–73)
NRBC BLD-RTO: 0 /100 WBC
PLATELET # BLD AUTO: 172 K/UL (ref 150–450)
PMV BLD AUTO: 11.3 FL (ref 9.2–12.9)
RBC # BLD AUTO: 4.45 M/UL (ref 4.6–6.2)
WBC # BLD AUTO: 12.43 K/UL (ref 3.9–12.7)

## 2021-04-30 PROCEDURE — 36415 COLL VENOUS BLD VENIPUNCTURE: CPT | Performed by: SURGERY

## 2021-04-30 PROCEDURE — 63600175 PHARM REV CODE 636 W HCPCS: Performed by: SURGERY

## 2021-04-30 PROCEDURE — 25000003 PHARM REV CODE 250: Performed by: SURGERY

## 2021-04-30 PROCEDURE — 85025 COMPLETE CBC W/AUTO DIFF WBC: CPT | Performed by: SURGERY

## 2021-04-30 RX ORDER — ATORVASTATIN CALCIUM 10 MG/1
20 TABLET, FILM COATED ORAL NIGHTLY
Status: DISCONTINUED | OUTPATIENT
Start: 2021-04-30 | End: 2021-04-30 | Stop reason: HOSPADM

## 2021-04-30 RX ORDER — ASPIRIN 81 MG/1
81 TABLET ORAL DAILY
Status: DISCONTINUED | OUTPATIENT
Start: 2021-04-30 | End: 2021-04-30 | Stop reason: HOSPADM

## 2021-04-30 RX ORDER — TETRAHYDROZOLINE HCL 0.05 %
2 DROPS OPHTHALMIC (EYE) 3 TIMES DAILY PRN
Status: CANCELLED | OUTPATIENT
Start: 2021-04-30

## 2021-04-30 RX ORDER — HYDROCODONE BITARTRATE AND ACETAMINOPHEN 5; 325 MG/1; MG/1
2 TABLET ORAL EVERY 6 HOURS PRN
Qty: 20 TABLET | Refills: 0 | Status: SHIPPED | OUTPATIENT
Start: 2021-04-30 | End: 2021-06-29 | Stop reason: CLARIF

## 2021-04-30 RX ORDER — AMIODARONE HYDROCHLORIDE 100 MG/1
100 TABLET ORAL DAILY
Status: DISCONTINUED | OUTPATIENT
Start: 2021-04-30 | End: 2021-04-30 | Stop reason: HOSPADM

## 2021-04-30 RX ADMIN — ASPIRIN 81 MG: 81 TABLET, FILM COATED ORAL at 08:04

## 2021-04-30 RX ADMIN — HYDROCODONE BITARTRATE AND ACETAMINOPHEN 2 TABLET: 5; 325 TABLET ORAL at 12:04

## 2021-04-30 RX ADMIN — SODIUM CHLORIDE, SODIUM LACTATE, POTASSIUM CHLORIDE, AND CALCIUM CHLORIDE: .6; .31; .03; .02 INJECTION, SOLUTION INTRAVENOUS at 08:04

## 2021-04-30 RX ADMIN — HYDROCODONE BITARTRATE AND ACETAMINOPHEN 2 TABLET: 5; 325 TABLET ORAL at 05:04

## 2021-04-30 RX ADMIN — AMIODARONE HYDROCHLORIDE 100 MG: 100 TABLET ORAL at 08:04

## 2021-05-03 ENCOUNTER — PATIENT MESSAGE (OUTPATIENT)
Dept: SURGERY | Facility: CLINIC | Age: 76
End: 2021-05-03

## 2021-05-07 LAB
FINAL PATHOLOGIC DIAGNOSIS: NORMAL
GROSS: NORMAL
Lab: NORMAL

## 2021-05-10 ENCOUNTER — PATIENT MESSAGE (OUTPATIENT)
Dept: SURGERY | Facility: HOSPITAL | Age: 76
End: 2021-05-10

## 2021-05-10 ENCOUNTER — OFFICE VISIT (OUTPATIENT)
Dept: SURGERY | Facility: CLINIC | Age: 76
End: 2021-05-10
Payer: MEDICARE

## 2021-05-10 VITALS
HEIGHT: 71 IN | SYSTOLIC BLOOD PRESSURE: 136 MMHG | TEMPERATURE: 98 F | WEIGHT: 262.56 LBS | BODY MASS INDEX: 36.76 KG/M2 | DIASTOLIC BLOOD PRESSURE: 80 MMHG

## 2021-05-10 DIAGNOSIS — K35.80 ACUTE APPENDICITIS, UNSPECIFIED ACUTE APPENDICITIS TYPE: ICD-10-CM

## 2021-05-10 DIAGNOSIS — Z01.818 PRE-OP TESTING: ICD-10-CM

## 2021-05-10 DIAGNOSIS — D36.9 ADENOMATOUS POLYP: Primary | ICD-10-CM

## 2021-05-10 PROCEDURE — 3288F FALL RISK ASSESSMENT DOCD: CPT | Mod: CPTII,S$GLB,, | Performed by: SURGERY

## 2021-05-10 PROCEDURE — 3288F PR FALLS RISK ASSESSMENT DOCUMENTED: ICD-10-PCS | Mod: CPTII,S$GLB,, | Performed by: SURGERY

## 2021-05-10 PROCEDURE — 1101F PR PT FALLS ASSESS DOC 0-1 FALLS W/OUT INJ PAST YR: ICD-10-PCS | Mod: CPTII,S$GLB,, | Performed by: SURGERY

## 2021-05-10 PROCEDURE — 99024 PR POST-OP FOLLOW-UP VISIT: ICD-10-PCS | Mod: S$GLB,,, | Performed by: SURGERY

## 2021-05-10 PROCEDURE — 1126F PR PAIN SEVERITY QUANTIFIED, NO PAIN PRESENT: ICD-10-PCS | Mod: S$GLB,,, | Performed by: SURGERY

## 2021-05-10 PROCEDURE — 99024 POSTOP FOLLOW-UP VISIT: CPT | Mod: S$GLB,,, | Performed by: SURGERY

## 2021-05-10 PROCEDURE — 1126F AMNT PAIN NOTED NONE PRSNT: CPT | Mod: S$GLB,,, | Performed by: SURGERY

## 2021-05-10 PROCEDURE — 1101F PT FALLS ASSESS-DOCD LE1/YR: CPT | Mod: CPTII,S$GLB,, | Performed by: SURGERY

## 2021-05-10 PROCEDURE — 99999 PR PBB SHADOW E&M-EST. PATIENT-LVL III: CPT | Mod: PBBFAC,,, | Performed by: SURGERY

## 2021-05-10 PROCEDURE — 99999 PR PBB SHADOW E&M-EST. PATIENT-LVL III: ICD-10-PCS | Mod: PBBFAC,,, | Performed by: SURGERY

## 2021-05-10 RX ORDER — SODIUM, POTASSIUM,MAG SULFATES 17.5-3.13G
1 SOLUTION, RECONSTITUTED, ORAL ORAL DAILY
Qty: 1 KIT | Refills: 0 | Status: SHIPPED | OUTPATIENT
Start: 2021-05-10 | End: 2021-05-12

## 2021-05-11 PROBLEM — K35.80 ACUTE APPENDICITIS: Status: RESOLVED | Noted: 2021-04-29 | Resolved: 2021-05-11

## 2021-05-12 ENCOUNTER — PATIENT MESSAGE (OUTPATIENT)
Dept: RESEARCH | Facility: HOSPITAL | Age: 76
End: 2021-05-12

## 2021-06-28 ENCOUNTER — LAB VISIT (OUTPATIENT)
Dept: URGENT CARE | Facility: CLINIC | Age: 76
End: 2021-06-28
Payer: MEDICARE

## 2021-06-28 DIAGNOSIS — Z01.818 PRE-OP TESTING: ICD-10-CM

## 2021-06-28 PROCEDURE — U0005 INFEC AGEN DETEC AMPLI PROBE: HCPCS | Performed by: SURGERY

## 2021-06-28 PROCEDURE — U0003 INFECTIOUS AGENT DETECTION BY NUCLEIC ACID (DNA OR RNA); SEVERE ACUTE RESPIRATORY SYNDROME CORONAVIRUS 2 (SARS-COV-2) (CORONAVIRUS DISEASE [COVID-19]), AMPLIFIED PROBE TECHNIQUE, MAKING USE OF HIGH THROUGHPUT TECHNOLOGIES AS DESCRIBED BY CMS-2020-01-R: HCPCS | Performed by: SURGERY

## 2021-06-29 ENCOUNTER — TELEPHONE (OUTPATIENT)
Dept: PREADMISSION TESTING | Facility: HOSPITAL | Age: 76
End: 2021-06-29

## 2021-06-29 VITALS — WEIGHT: 262 LBS | HEIGHT: 71 IN | BODY MASS INDEX: 36.68 KG/M2

## 2021-06-29 LAB — SARS-COV-2 RNA RESP QL NAA+PROBE: NOT DETECTED

## 2021-06-30 ENCOUNTER — ANESTHESIA EVENT (OUTPATIENT)
Dept: ENDOSCOPY | Facility: HOSPITAL | Age: 76
End: 2021-06-30
Payer: MEDICARE

## 2021-07-01 ENCOUNTER — HOSPITAL ENCOUNTER (OUTPATIENT)
Facility: HOSPITAL | Age: 76
Discharge: HOME | End: 2021-07-01
Attending: SURGERY | Admitting: SURGERY
Payer: MEDICARE

## 2021-07-01 ENCOUNTER — ANESTHESIA (OUTPATIENT)
Dept: ENDOSCOPY | Facility: HOSPITAL | Age: 76
End: 2021-07-01
Payer: MEDICARE

## 2021-07-01 VITALS
HEIGHT: 71 IN | RESPIRATION RATE: 15 BRPM | OXYGEN SATURATION: 97 % | BODY MASS INDEX: 35.81 KG/M2 | WEIGHT: 255.75 LBS | TEMPERATURE: 98 F | SYSTOLIC BLOOD PRESSURE: 120 MMHG | HEART RATE: 67 BPM | DIASTOLIC BLOOD PRESSURE: 75 MMHG

## 2021-07-01 DIAGNOSIS — Z12.11 COLON CANCER SCREENING: Primary | ICD-10-CM

## 2021-07-01 PROCEDURE — 63600175 PHARM REV CODE 636 W HCPCS: Performed by: NURSE ANESTHETIST, CERTIFIED REGISTERED

## 2021-07-01 PROCEDURE — G0105 COLORECTAL SCRN; HI RISK IND: HCPCS | Mod: 79,,, | Performed by: SURGERY

## 2021-07-01 PROCEDURE — 63600175 PHARM REV CODE 636 W HCPCS: Performed by: SURGERY

## 2021-07-01 PROCEDURE — D9220A PRA ANESTHESIA: Mod: CRNA,,, | Performed by: NURSE ANESTHETIST, CERTIFIED REGISTERED

## 2021-07-01 PROCEDURE — D9220A PRA ANESTHESIA: ICD-10-PCS | Mod: CRNA,,, | Performed by: NURSE ANESTHETIST, CERTIFIED REGISTERED

## 2021-07-01 PROCEDURE — G0105 COLORECTAL SCRN; HI RISK IND: HCPCS | Performed by: SURGERY

## 2021-07-01 PROCEDURE — G0105 COLORECTAL SCRN; HI RISK IND: ICD-10-PCS | Mod: 79,,, | Performed by: SURGERY

## 2021-07-01 PROCEDURE — D9220A PRA ANESTHESIA: Mod: ANES,,, | Performed by: ANESTHESIOLOGY

## 2021-07-01 PROCEDURE — D9220A PRA ANESTHESIA: ICD-10-PCS | Mod: ANES,,, | Performed by: ANESTHESIOLOGY

## 2021-07-01 PROCEDURE — 25000003 PHARM REV CODE 250: Performed by: NURSE ANESTHETIST, CERTIFIED REGISTERED

## 2021-07-01 PROCEDURE — 37000009 HC ANESTHESIA EA ADD 15 MINS: Performed by: SURGERY

## 2021-07-01 PROCEDURE — 37000008 HC ANESTHESIA 1ST 15 MINUTES: Performed by: SURGERY

## 2021-07-01 RX ORDER — LIDOCAINE HYDROCHLORIDE 20 MG/ML
INJECTION, SOLUTION EPIDURAL; INFILTRATION; INTRACAUDAL; PERINEURAL
Status: DISCONTINUED | OUTPATIENT
Start: 2021-07-01 | End: 2021-07-01

## 2021-07-01 RX ORDER — SODIUM CHLORIDE, SODIUM LACTATE, POTASSIUM CHLORIDE, CALCIUM CHLORIDE 600; 310; 30; 20 MG/100ML; MG/100ML; MG/100ML; MG/100ML
INJECTION, SOLUTION INTRAVENOUS CONTINUOUS
Status: DISCONTINUED | OUTPATIENT
Start: 2021-07-01 | End: 2021-07-01 | Stop reason: HOSPADM

## 2021-07-01 RX ORDER — PROPOFOL 10 MG/ML
VIAL (ML) INTRAVENOUS
Status: DISCONTINUED | OUTPATIENT
Start: 2021-07-01 | End: 2021-07-01

## 2021-07-01 RX ADMIN — PROPOFOL 50 MG: 10 INJECTION, EMULSION INTRAVENOUS at 09:07

## 2021-07-01 RX ADMIN — SODIUM CHLORIDE, SODIUM LACTATE, POTASSIUM CHLORIDE, AND CALCIUM CHLORIDE: 600; 310; 30; 20 INJECTION, SOLUTION INTRAVENOUS at 09:07

## 2021-07-01 RX ADMIN — LIDOCAINE HYDROCHLORIDE 80 MG: 20 INJECTION, SOLUTION EPIDURAL; INFILTRATION; INTRACAUDAL; PERINEURAL at 09:07

## 2021-07-01 RX ADMIN — SODIUM CHLORIDE, SODIUM LACTATE, POTASSIUM CHLORIDE, AND CALCIUM CHLORIDE: 600; 310; 30; 20 INJECTION, SOLUTION INTRAVENOUS at 08:07

## 2022-02-01 ENCOUNTER — TELEPHONE (OUTPATIENT)
Dept: ENDOSCOPY | Facility: HOSPITAL | Age: 77
End: 2022-02-01
Payer: MEDICARE

## 2022-02-01 DIAGNOSIS — K22.70 BARRETT'S ESOPHAGUS WITHOUT DYSPLASIA: Primary | ICD-10-CM

## 2022-02-01 NOTE — TELEPHONE ENCOUNTER
----- Message from Shaniqua Willis MA sent at 10/13/2021  8:55 PM CDT -----   Dimitri Leary MD   4/13/2021  7:45 AM CDT Back to Top    Please let the patient know the biopsies of the esophagus did not showed Horner's.  EGD in 1 year

## 2022-02-02 ENCOUNTER — TELEPHONE (OUTPATIENT)
Dept: ENDOSCOPY | Facility: HOSPITAL | Age: 77
End: 2022-02-02
Payer: MEDICARE

## 2022-02-03 ENCOUNTER — PATIENT MESSAGE (OUTPATIENT)
Dept: ENDOSCOPY | Facility: HOSPITAL | Age: 77
End: 2022-02-03
Payer: MEDICARE

## 2022-03-31 ENCOUNTER — TELEPHONE (OUTPATIENT)
Dept: ENDOSCOPY | Facility: HOSPITAL | Age: 77
End: 2022-03-31
Payer: MEDICARE

## 2022-03-31 NOTE — TELEPHONE ENCOUNTER
Spoke with patient about arrival time @ 1000.   EGD  Covid test = Rapid    NPO status reviewed: Patient may eat until 7:00pm.  After 7pm, pt may have CLEAR liquids ONLY until completely NPO at Midnight.    Medications: Do not take Insulin or oral diabetic medications the day of the procedure.    Take as prescribed: heart, seizure and blood pressure medication in the morning with a sip of water (less than an ounce).  Take any breathing medications and bring inhalers to hospital with you.     Leave all valuables and jewelry at home. Wear comfortable clothes to procedure to change into hospital gown.   You cannot drive for 24 hours after your procedure because you will receive sedation for your procedure to make you comfortable.    A ride must be provided at discharge.

## 2022-04-04 ENCOUNTER — ANESTHESIA EVENT (OUTPATIENT)
Dept: ENDOSCOPY | Facility: HOSPITAL | Age: 77
End: 2022-04-04
Payer: MEDICARE

## 2022-04-04 ENCOUNTER — HOSPITAL ENCOUNTER (OUTPATIENT)
Facility: HOSPITAL | Age: 77
Discharge: HOME | End: 2022-04-04
Attending: INTERNAL MEDICINE | Admitting: INTERNAL MEDICINE
Payer: MEDICARE

## 2022-04-04 ENCOUNTER — ANESTHESIA (OUTPATIENT)
Dept: ENDOSCOPY | Facility: HOSPITAL | Age: 77
End: 2022-04-04
Payer: MEDICARE

## 2022-04-04 VITALS
TEMPERATURE: 98 F | RESPIRATION RATE: 16 BRPM | BODY MASS INDEX: 36.4 KG/M2 | WEIGHT: 260 LBS | HEIGHT: 71 IN | OXYGEN SATURATION: 96 % | HEART RATE: 62 BPM | DIASTOLIC BLOOD PRESSURE: 82 MMHG | SYSTOLIC BLOOD PRESSURE: 129 MMHG

## 2022-04-04 DIAGNOSIS — K22.719 BARRETT'S ESOPHAGUS WITH DYSPLASIA: Primary | ICD-10-CM

## 2022-04-04 LAB
CTP QC/QA: YES
SARS-COV-2 AG RESP QL IA.RAPID: NEGATIVE

## 2022-04-04 PROCEDURE — C1886 CATHETER, ABLATION: HCPCS | Performed by: INTERNAL MEDICINE

## 2022-04-04 PROCEDURE — 25000003 PHARM REV CODE 250: Performed by: INTERNAL MEDICINE

## 2022-04-04 PROCEDURE — 63600175 PHARM REV CODE 636 W HCPCS: Performed by: NURSE ANESTHETIST, CERTIFIED REGISTERED

## 2022-04-04 PROCEDURE — 43270 PR EGD, FLEX, W/ABLATION, TUMOR/POLYP/LESION(S), W/ DILATION: ICD-10-PCS | Mod: ,,, | Performed by: INTERNAL MEDICINE

## 2022-04-04 PROCEDURE — 88305 TISSUE EXAM BY PATHOLOGIST: CPT | Mod: 26,,, | Performed by: PATHOLOGY

## 2022-04-04 PROCEDURE — 27201012 HC FORCEPS, HOT/COLD, DISP: Performed by: INTERNAL MEDICINE

## 2022-04-04 PROCEDURE — 25000003 PHARM REV CODE 250: Performed by: NURSE ANESTHETIST, CERTIFIED REGISTERED

## 2022-04-04 PROCEDURE — 43270 EGD LESION ABLATION: CPT | Mod: ,,, | Performed by: INTERNAL MEDICINE

## 2022-04-04 PROCEDURE — 88305 TISSUE EXAM BY PATHOLOGIST: ICD-10-PCS | Mod: 26,,, | Performed by: PATHOLOGY

## 2022-04-04 PROCEDURE — 37000009 HC ANESTHESIA EA ADD 15 MINS: Performed by: INTERNAL MEDICINE

## 2022-04-04 PROCEDURE — 43239 EGD BIOPSY SINGLE/MULTIPLE: CPT | Mod: 59 | Performed by: INTERNAL MEDICINE

## 2022-04-04 PROCEDURE — 43239 PR EGD, FLEX, W/BIOPSY, SGL/MULTI: ICD-10-PCS | Mod: 59,,, | Performed by: INTERNAL MEDICINE

## 2022-04-04 PROCEDURE — 88305 TISSUE EXAM BY PATHOLOGIST: CPT | Mod: 59 | Performed by: PATHOLOGY

## 2022-04-04 PROCEDURE — 37000008 HC ANESTHESIA 1ST 15 MINUTES: Performed by: INTERNAL MEDICINE

## 2022-04-04 PROCEDURE — 43239 EGD BIOPSY SINGLE/MULTIPLE: CPT | Mod: 59,,, | Performed by: INTERNAL MEDICINE

## 2022-04-04 PROCEDURE — 43270 EGD LESION ABLATION: CPT | Performed by: INTERNAL MEDICINE

## 2022-04-04 RX ORDER — DEXMEDETOMIDINE HYDROCHLORIDE 100 UG/ML
INJECTION, SOLUTION INTRAVENOUS
Status: DISCONTINUED | OUTPATIENT
Start: 2022-04-04 | End: 2022-04-04

## 2022-04-04 RX ORDER — SODIUM CHLORIDE, SODIUM LACTATE, POTASSIUM CHLORIDE, CALCIUM CHLORIDE 600; 310; 30; 20 MG/100ML; MG/100ML; MG/100ML; MG/100ML
INJECTION, SOLUTION INTRAVENOUS CONTINUOUS PRN
Status: DISCONTINUED | OUTPATIENT
Start: 2022-04-04 | End: 2022-04-04

## 2022-04-04 RX ORDER — LIDOCAINE HCL/PF 100 MG/5ML
SYRINGE (ML) INTRAVENOUS
Status: DISCONTINUED | OUTPATIENT
Start: 2022-04-04 | End: 2022-04-04

## 2022-04-04 RX ORDER — SODIUM CHLORIDE 9 MG/ML
INJECTION, SOLUTION INTRAVENOUS CONTINUOUS
Status: DISCONTINUED | OUTPATIENT
Start: 2022-04-04 | End: 2022-04-04 | Stop reason: HOSPADM

## 2022-04-04 RX ORDER — SODIUM CHLORIDE 0.9 % (FLUSH) 0.9 %
10 SYRINGE (ML) INJECTION
Status: DISCONTINUED | OUTPATIENT
Start: 2022-04-04 | End: 2022-04-04 | Stop reason: HOSPADM

## 2022-04-04 RX ORDER — PROPOFOL 10 MG/ML
VIAL (ML) INTRAVENOUS CONTINUOUS PRN
Status: DISCONTINUED | OUTPATIENT
Start: 2022-04-04 | End: 2022-04-04

## 2022-04-04 RX ORDER — PROPOFOL 10 MG/ML
VIAL (ML) INTRAVENOUS
Status: DISCONTINUED | OUTPATIENT
Start: 2022-04-04 | End: 2022-04-04

## 2022-04-04 RX ORDER — ETOMIDATE 2 MG/ML
INJECTION INTRAVENOUS
Status: DISCONTINUED | OUTPATIENT
Start: 2022-04-04 | End: 2022-04-04

## 2022-04-04 RX ADMIN — TOPICAL ANESTHETIC 1 EACH: 200 SPRAY DENTAL; PERIODONTAL at 11:04

## 2022-04-04 RX ADMIN — ETOMIDATE 6 MG: 2 INJECTION, SOLUTION INTRAVENOUS at 11:04

## 2022-04-04 RX ADMIN — SODIUM CHLORIDE: 0.9 INJECTION, SOLUTION INTRAVENOUS at 10:04

## 2022-04-04 RX ADMIN — DEXMEDETOMIDINE HCL 12 MCG: 100 INJECTION INTRAVENOUS at 11:04

## 2022-04-04 RX ADMIN — PROPOFOL 150 MCG/KG/MIN: 10 INJECTION, EMULSION INTRAVENOUS at 11:04

## 2022-04-04 RX ADMIN — SODIUM CHLORIDE, SODIUM LACTATE, POTASSIUM CHLORIDE, AND CALCIUM CHLORIDE: .6; .31; .03; .02 INJECTION, SOLUTION INTRAVENOUS at 11:04

## 2022-04-04 RX ADMIN — LIDOCAINE HYDROCHLORIDE 50 MG: 20 INJECTION, SOLUTION INTRAVENOUS at 11:04

## 2022-04-04 RX ADMIN — PROPOFOL 20 MG: 10 INJECTION, EMULSION INTRAVENOUS at 11:04

## 2022-04-04 RX ADMIN — GLYCOPYRROLATE 0.2 MG: 0.2 INJECTION, SOLUTION INTRAMUSCULAR; INTRAVITREAL at 11:04

## 2022-04-04 NOTE — TRANSFER OF CARE
"Anesthesia Transfer of Care Note    Patient: Devon Berry    Procedure(s) Performed: Procedure(s) (LRB):  EGD (ESOPHAGOGASTRODUODENOSCOPY) (N/A)    Patient location: GI    Anesthesia Type: MAC    Transport from OR: Transported from OR on 2-3 L/min O2 by NC with adequate spontaneous ventilation    Post pain: adequate analgesia    Post assessment: no apparent anesthetic complications and tolerated procedure well    Post vital signs: stable    Level of consciousness: awake    Nausea/Vomiting: no nausea/vomiting    Complications: none    Transfer of care protocol was followed      Last vitals:   Visit Vitals  /86 (Patient Position: Lying)   Pulse 62   Temp 36.9 °C (98.4 °F) (Temporal)   Resp 18   Ht 5' 11" (1.803 m)   Wt 117.9 kg (260 lb)   SpO2 95%   BMI 36.26 kg/m²     "

## 2022-04-04 NOTE — BRIEF OP NOTE
Discharge Summary/Instructions after an Endoscopic Procedure    Patient Name: Devon Berry  Patient MRN: [id number]  Patient YOB: 1945 Monday, April 4, 2022  Dimitri Leary MD  Dear patient,  As a result of recent federal legislation (The Federal Cures Act), you may receive lab or pathology results from your procedure in your [facility] account before your physician is able to contact you. Your physician or their representative will relay the results to you with their recommendations at their soonest availability.  Thank you,    Your health is very important to us during the Covid Crisis. Following your procedure today, you will receive a daily text for 2 weeks asking about signs or symptoms of Covid 19.  Please respond to this text when you receive it so we can follow up and keep you as safe as possible.     RESTRICTIONS:  During your procedure today, you received medications for sedation.  These medications may affect your judgment, balance and coordination.  Therefore, for 24 hours, you have the following restrictions:     - DO NOT drive a car, operate machinery, make legal/financial decisions, sign important papers or drink alcohol.      ACTIVITY:  Today: no heavy lifting, straining or running due to procedural sedation/anesthesia.  The following day: return to full activity including work.    DIET:  Eat and drink normally unless instructed otherwise.     TREATMENT FOR COMMON SIDE EFFECTS:  - Mild abdominal pain, nausea, belching, bloating or excessive gas:  rest, eat lightly and use a heating pad.  - Sore Throat: treat with throat lozenges and/or gargle with warm salt water.  - Because air was used during the procedure, expelling large amounts of air from your rectum or belching is normal.  - If a bowel prep was taken, you may not have a bowel movement for 1-3 days.  This is normal.      SYMPTOMS TO WATCH FOR AND REPORT TO YOUR PHYSICIAN:  1. Abdominal pain or bloating, other than gas  cramps.  2. Chest pain.  3. Back pain.  4. Signs of infection such as: chills or fever occurring within 24 hours after the procedure.  5. Rectal bleeding, which would show as bright red, maroon, or black stools. (A tablespoon of blood from the rectum is not serious, especially if hemorrhoids are present.)  6. Vomiting.  7. Weakness or dizziness.      GO DIRECTLY TO THE NEAREST EMERGENCY ROOM IF YOU HAVE ANY OF THE FOLLOWING:     Difficulty breathing              Chills and/or fever over 101 F   Persistent vomiting and/or vomiting blood   Severe abdominal pain   Severe chest pain   Black, tarry stools   Bleeding- more than one tablespoon   Any other symptom or condition that you feel may need urgent attention    Your doctor recommends these additional instructions:  If any biopsies were taken, your doctors clinic will contact you in 1 to 2 weeks with any results.    - Discharge patient to home (ambulatory).   - Resume previous diet.   - Await pathology results.   - Repeat upper endoscopy in 6 months for follow-up of Horner's ablation and for surveillance based on pathology results.   - Use Prilosec (omeprazole) 40 mg PO BID.    For questions, problems or results please call your physician - Dimitri Leary MD.    EMERGENCY PHONE NUMBER: [phone],  LAB RESULTS: [phone]    IF A COMPLICATION OR EMERGENCY SITUATION ARISES AND YOU ARE UNABLE TO REACH YOUR PHYSICIAN - GO DIRECTLY TO THE EMERGENCY ROOM.

## 2022-04-04 NOTE — H&P
History & Physical - Short Stay  Gastroenterology      SUBJECTIVE:     Procedure: EGD    Chief Complaint/Indication for Procedure: Horner's Esophagus    History of Present Illness:  Patient is a 76 y.o. male presents with Horner's esophagus S/P eradication here for surveillance.     PTA Medications   Medication Sig    amiodarone (PACERONE) 200 MG Tab Take 100 mg by mouth once daily.    aspirin (ECOTRIN) 81 MG EC tablet Take 81 mg by mouth once daily.    atorvastatin (LIPITOR) 20 MG tablet Take 20 mg by mouth every evening.     ferrous sulfate (FEOSOL) 325 mg (65 mg iron) Tab tablet Take 325 mg by mouth 2 (two) times daily.     omeprazole (PRILOSEC) 20 MG capsule TAKE TWO CAPSULES BY MOUTH TWICE DAILY **THANK YOU**       Review of patient's allergies indicates:  No Known Allergies     Past Medical History:   Diagnosis Date    Horner's esophagus     CAD (coronary artery disease)     Colon cancer     colorectal cancer    Colon polyps     Diverticulosis of colon     GERD (gastroesophageal reflux disease)     Heartburn     Hemorrhoids     HTN (hypertension)     Hyperlipidemia     Pneumonia      Past Surgical History:   Procedure Laterality Date    CARDIAC SURGERY      COLONOSCOPY      COLONOSCOPY N/A 7/1/2021    Procedure: COLONOSCOPY;  Surgeon: Osmel Styles MD;  Location: [facility];  Service: Endoscopy;  Laterality: N/A;    CORONARY ARTERY BYPASS GRAFT  03/04/2016    3 vessels    CORONARY ARTERY BYPASS GRAFT      ESOPHAGOGASTRODUODENOSCOPY      ESOPHAGOGASTRODUODENOSCOPY N/A 1/17/2019    Procedure: EGD (ESOPHAGOGASTRODUODENOSCOPY);  Surgeon: Dimitri Leary MD;  Location: [address]);  Service: Endoscopy;  Laterality: N/A;  Outside cardiology records under media tab.    ESOPHAGOGASTRODUODENOSCOPY N/A 4/16/2019    Procedure: EGD (ESOPHAGOGASTRODUODENOSCOPY), Horner's;  Surgeon: Dimitri Leary MD;  Location: [facility];  Service: Endoscopy;  Laterality: N/A;     ESOPHAGOGASTRODUODENOSCOPY N/A 7/22/2019    Procedure: EGD (ESOPHAGOGASTRODUODENOSCOPY);  Surgeon: Dimitri Leary MD;  Location: [facility];  Service: Endoscopy;  Laterality: N/A;    ESOPHAGOGASTRODUODENOSCOPY N/A 11/8/2019    Procedure: EGD (ESOPHAGOGASTRODUODENOSCOPY);  Surgeon: Dimitri Leary MD;  Location: [facility] ENDO;  Service: Endoscopy;  Laterality: N/A;    ESOPHAGOGASTRODUODENOSCOPY N/A 3/9/2020    Procedure: EGD (ESOPHAGOGASTRODUODENOSCOPY);  Surgeon: Dimitri Leary MD;  Location: [facility] ENDO;  Service: Endoscopy;  Laterality: N/A;    ESOPHAGOGASTRODUODENOSCOPY N/A 8/21/2020    Procedure: EGD (ESOPHAGOGASTRODUODENOSCOPY);  Surgeon: Dimitri Leary MD;  Location: [facility] ENDO;  Service: Endoscopy;  Laterality: N/A;  Horner's esophagus with dysplasia [K22.719]    ESOPHAGOGASTRODUODENOSCOPY N/A 4/9/2021    Procedure: EGD (ESOPHAGOGASTRODUODENOSCOPY);  Surgeon: Dimitri Leary MD;  Location: [facility];  Service: Endoscopy;  Laterality: N/A;  Needs Rapid MP    HEMORRHOID SURGERY      LAPAROSCOPIC APPENDECTOMY N/A 4/29/2021    Procedure: APPENDECTOMY, LAPAROSCOPIC;  Surgeon: Osmel Styles MD;  Location: [facility] OR;  Service: General;  Laterality: N/A;    LAPAROSCOPIC LEFT COLON RESECTION      LAPAROSCOPIC LYSIS OF ADHESIONS N/A 4/29/2021    Procedure: LYSIS, ADHESIONS, LAPAROSCOPIC;  Surgeon: Osmel Styles MD;  Location: [facility] OR;  Service: General;  Laterality: N/A;    SIGMOIDECTOMY  2015    TONSILLECTOMY, ADENOIDECTOMY       Family History   Problem Relation Age of Onset    Heart disease Father      Social History     Tobacco Use    Smoking status: Current Every Day Smoker     Packs/day: 1.00     Types: Cigarettes    Smokeless tobacco: Never Used   Substance Use Topics    Alcohol use: Yes     Comment: 3.6 oz/week    Drug use: No       Review of Systems:  Constitutional: no fever or chills  Respiratory: no cough or shortness of breath  Cardiovascular: no chest pain or  palpitations    OBJECTIVE:     Vital Signs (Most Recent)       Physical Exam:  General: well developed, well nourished  Lungs:  normal respiratory effort  Heart: regular rate, S1, S2 normal    Laboratory  CBC: No results for input(s): WBC, RBC, HGB, HCT, PLT, MCV, MCH, MCHC in the last 168 hours.  CMP: No results for input(s): GLU, CALCIUM, ALBUMIN, PROT, NA, K, CO2, CL, BUN, CREATININE, ALKPHOS, ALT, AST, BILITOT in the last 168 hours.  Coagulation: No results for input(s): LABPROT, INR, APTT in the last 168 hours.      Diagnostic Results:      ASSESSMENT/PLAN:     Horner's esophagus eradication follow up    Plan: EGD    Anesthesia Plan: MAC    ASA Grade: ASA 3 - Patient with moderate systemic disease with functional limitations     The impression and plan was discussed in detail with the patient and family. All questions have been answered and the patient voices understanding of our plan at this point. The risk of the procedure was discussed in detail which includes but not limited to bleeding, infection, perforation in some cases requiring surgery with its spectrum of complications.

## 2022-04-04 NOTE — [employer]OVATION PATIENT INSTRUCTIONS
Discharge Summary/Instructions after an Endoscopic Procedure  Patient Name: Dveon Berry  Patient MRN: [id number]  Patient YOB: 1945 Monday, April 4, 2022  Dimitri Leary MD  Dear patient,  As a result of recent federal legislation (The Federal Cures Act), you may   receive lab or pathology results from your procedure in your [facility]   account before your physician is able to contact you. Your physician or   their representative will relay the results to you with their   recommendations at their soonest availability.  Thank you,  Your health is very important to us during the Covid Crisis. Following your   procedure today, you will receive a daily text for 2 weeks asking about   signs or symptoms of Covid 19.  Please respond to this text when you   receive it so we can follow up and keep you as safe as possible.   RESTRICTIONS:  During your procedure today, you received medications for sedation.  These   medications may affect your judgment, balance and coordination.  Therefore,   for 24 hours, you have the following restrictions:   - DO NOT drive a car, operate machinery, make legal/financial decisions,   sign important papers or drink alcohol.    ACTIVITY:  Today: no heavy lifting, straining or running due to procedural   sedation/anesthesia.  The following day: return to full activity including work.  DIET:  Eat and drink normally unless instructed otherwise.     TREATMENT FOR COMMON SIDE EFFECTS:  - Mild abdominal pain, nausea, belching, bloating or excessive gas:  rest,   eat lightly and use a heating pad.  - Sore Throat: treat with throat lozenges and/or gargle with warm salt   water.  - Because air was used during the procedure, expelling large amounts of air   from your rectum or belching is normal.  - If a bowel prep was taken, you may not have a bowel movement for 1-3 days.    This is normal.  SYMPTOMS TO WATCH FOR AND REPORT TO YOUR PHYSICIAN:  1. Abdominal pain or bloating, other than  gas cramps.  2. Chest pain.  3. Back pain.  4. Signs of infection such as: chills or fever occurring within 24 hours   after the procedure.  5. Rectal bleeding, which would show as bright red, maroon, or black stools.   (A tablespoon of blood from the rectum is not serious, especially if   hemorrhoids are present.)  6. Vomiting.  7. Weakness or dizziness.  GO DIRECTLY TO THE NEAREST EMERGENCY ROOM IF YOU HAVE ANY OF THE FOLLOWING:      Difficulty breathing              Chills and/or fever over 101 F   Persistent vomiting and/or vomiting blood   Severe abdominal pain   Severe chest pain   Black, tarry stools   Bleeding- more than one tablespoon   Any other symptom or condition that you feel may need urgent attention  Your doctor recommends these additional instructions:  If any biopsies were taken, your doctors clinic will contact you in 1 to 2   weeks with any results.  - Discharge patient to home (ambulatory).   - Resume previous diet.   - Await pathology results.   - Repeat upper endoscopy in 6 months for follow-up of Horner's ablation and   for surveillance based on pathology results.   - Use Prilosec (omeprazole) 40 mg PO BID.  For questions, problems or results please call your physician - Dimitri Leary MD.  EMERGENCY PHONE NUMBER: [phone],  LAB RESULTS: [phone]  IF A COMPLICATION OR EMERGENCY SITUATION ARISES AND YOU ARE UNABLE TO REACH   YOUR PHYSICIAN - GO DIRECTLY TO THE EMERGENCY ROOM.  Dimitri Leary MD  4/4/2022 11:59:39 AM  This report has been verified and signed electronically.  Dear patient,  As a result of recent federal legislation (The Federal Cures Act), you may   receive lab or pathology results from your procedure in your [facility]   account before your physician is able to contact you. Your physician or   their representative will relay the results to you with their   recommendations at their soonest availability.  Thank you,  PROVATION

## 2022-04-04 NOTE — ANESTHESIA PREPROCEDURE EVALUATION
04/04/2022  Devon Berry is a 76 y.o., male for EGD under MAC    Past Medical History:   Diagnosis Date    Horner's esophagus     CAD (coronary artery disease)     Colon cancer     colorectal cancer    Colon polyps     Diverticulosis of colon     GERD (gastroesophageal reflux disease)     Heartburn     Hemorrhoids     HTN (hypertension)     Hyperlipidemia     Pneumonia        Past Surgical History:   Procedure Laterality Date    CARDIAC SURGERY      COLONOSCOPY      COLONOSCOPY N/A 7/1/2021    Procedure: COLONOSCOPY;  Surgeon: Osmel Styles MD;  Location: [facility];  Service: Endoscopy;  Laterality: N/A;    CORONARY ARTERY BYPASS GRAFT  03/04/2016    3 vessels    CORONARY ARTERY BYPASS GRAFT      ESOPHAGOGASTRODUODENOSCOPY      ESOPHAGOGASTRODUODENOSCOPY N/A 1/17/2019    Procedure: EGD (ESOPHAGOGASTRODUODENOSCOPY);  Surgeon: Dimitri Leary MD;  Location: [facility] ([address] [place] [address]);  Service: Endoscopy;  Laterality: N/A;  Outside cardiology records under media tab.    ESOPHAGOGASTRODUODENOSCOPY N/A 4/16/2019    Procedure: EGD (ESOPHAGOGASTRODUODENOSCOPY), Horner's;  Surgeon: Dimitri Leary MD;  Location: [facility];  Service: Endoscopy;  Laterality: N/A;    ESOPHAGOGASTRODUODENOSCOPY N/A 7/22/2019    Procedure: EGD (ESOPHAGOGASTRODUODENOSCOPY);  Surgeon: Dimitri Leary MD;  Location: [facility];  Service: Endoscopy;  Laterality: N/A;    ESOPHAGOGASTRODUODENOSCOPY N/A 11/8/2019    Procedure: EGD (ESOPHAGOGASTRODUODENOSCOPY);  Surgeon: Dimitri Leary MD;  Location: [facility];  Service: Endoscopy;  Laterality: N/A;    ESOPHAGOGASTRODUODENOSCOPY N/A 3/9/2020    Procedure: EGD (ESOPHAGOGASTRODUODENOSCOPY);  Surgeon: Dimitri Leary MD;  Location: [facility];  Service: Endoscopy;  Laterality: N/A;    ESOPHAGOGASTRODUODENOSCOPY N/A 8/21/2020    Procedure: EGD  (ESOPHAGOGASTRODUODENOSCOPY);  Surgeon: Dimitri Leary MD;  Location: [facility] ENDO;  Service: Endoscopy;  Laterality: N/A;  Horner's esophagus with dysplasia [K22.719]    ESOPHAGOGASTRODUODENOSCOPY N/A 4/9/2021    Procedure: EGD (ESOPHAGOGASTRODUODENOSCOPY);  Surgeon: Dmiitri Leary MD;  Location: [facility] ENDO;  Service: Endoscopy;  Laterality: N/A;  Needs Rapid MP    HEMORRHOID SURGERY      LAPAROSCOPIC APPENDECTOMY N/A 4/29/2021    Procedure: APPENDECTOMY, LAPAROSCOPIC;  Surgeon: Osmel Styles MD;  Location: [facility];  Service: General;  Laterality: N/A;    LAPAROSCOPIC LEFT COLON RESECTION      LAPAROSCOPIC LYSIS OF ADHESIONS N/A 4/29/2021    Procedure: LYSIS, ADHESIONS, LAPAROSCOPIC;  Surgeon: Osmel Styles MD;  Location: [facility];  Service: General;  Laterality: N/A;    SIGMOIDECTOMY  2015    TONSILLECTOMY, ADENOIDECTOMY       Past Medical History:   Diagnosis Date    Horner's esophagus     CAD (coronary artery disease)     Colon cancer     colorectal cancer    Colon polyps     Diverticulosis of colon     GERD (gastroesophageal reflux disease)     Heartburn     Hemorrhoids     HTN (hypertension)     Hyperlipidemia     Pneumonia    Sinus rhythm with 1st degree A-V block   Right bundle branch block   Abnormal ECG   No previous ECGs available   Confirmed by GURPREET SOTO MD ([id number]) on 5/2/2021 8:11:55 PM     Pre-op Assessment    I have reviewed the Patient Summary Reports.    I have reviewed the Nursing Notes. I have reviewed the NPO Status.   I have reviewed the Medications.     Review of Systems  Anesthesia Hx:  No problems with previous Anesthesia No airway issues noted on previous GA. History of prior surgery of interest to airway management or planning: heart surgery. Previous anesthesia: General  04/29/2021 Appendectomy with general anesthesia.  Procedure performed at an [facility]. Denies Family Hx of Anesthesia complications.   Denies Personal Hx of Anesthesia complications.    Social:  Smoker, Social Alcohol Use    Hematology/Oncology:  Hematology Normal        Cardiovascular:   Hypertension CAD  Dysrhythmias atrial fibrillation ECG has been reviewed. CAD, s/p CABG 2016, H/O parox A fib, on Amiodarone, controlled.   Pulmonary:  Pulmonary Normal    Renal/:  Renal/ Normal     Hepatic/GI:   GERD Horner's esophagus   Neurological:  Neurology Normal    Endocrine:  Endocrine Normal        Physical Exam  General:  Obesity      Airway/Jaw/Neck:  Airway Findings: Mouth Opening: Normal   Tongue: Normal   General Airway Assessment: Adult Mallampati: II  TM Distance: Normal, at least 6 cm   Jaw/Neck Findings:     Neck ROM: Normal ROM      Eyes/Ears/Nose:  EYES/EARS/NOSE FINDINGS: Normal   Dental:  Dental Findings: In tact     Chest/Lungs:  Chest/Lungs Clear    Heart/Vascular:  Heart Findings: Normal Heart murmur: negative Vascular Findings: Normal       Mental Status:  Mental Status Findings:  Cooperative, Alert and Oriented         Anesthesia Plan  Type of Anesthesia, risks & benefits discussed:  Anesthesia Type:  MAC    Patient's Preference:   Plan Factors:          Intra-op Monitoring Plan: standard ASA monitors  Intra-op Monitoring Plan Comments:   Post Op Pain Control Plan: per primary service following discharge from PACU  Post Op Pain Control Plan Comments:     Induction:   IV  Beta Blocker:  Patient is not currently on a Beta-Blocker (No further documentation required).       Informed Consent: Informed consent signed with the Patient and all parties understand the risks and agree with anesthesia plan.  All questions answered.  Anesthesia consent signed with patient.  ASA Score: 3     Day of Surgery Review of History & Physical:              Ready For Surgery From Anesthesia Perspective.           Physical Exam  General: Obesity    Airway:  Mallampati: II   Mouth Opening: Normal  TM Distance: Normal, at least 6 cm  Tongue: Normal  Neck ROM: Normal ROM    Dental:  In  tact          Anesthesia Plan  Type of Anesthesia, risks & benefits discussed:    Anesthesia Type: MAC  Intra-op Monitoring Plan: standard ASA monitors  Post Op Pain Control Plan: per primary service following discharge from PACU  Induction:  IV  Informed Consent: Informed consent signed with the Patient and all parties understand the risks and agree with anesthesia plan.  All questions answered.   ASA Score: 3    Ready For Surgery From Anesthesia Perspective.       .

## 2022-04-04 NOTE — ANESTHESIA POSTPROCEDURE EVALUATION
Anesthesia Post Evaluation    Patient: Devon Berry    Procedure(s) Performed: Procedure(s) (LRB):  EGD (ESOPHAGOGASTRODUODENOSCOPY) (N/A)    Final Anesthesia Type: MAC      Patient location during evaluation: GI PACU  Patient participation: Yes- Able to Participate  Level of consciousness: awake and alert and oriented  Post-procedure vital signs: reviewed and stable  Pain management: adequate  Airway patency: patent    PONV status at discharge: No PONV  Anesthetic complications: no      Cardiovascular status: hemodynamically stable  Respiratory status: room air, spontaneous ventilation and unassisted  Hydration status: euvolemic  Follow-up not needed.          Vitals Value Taken Time   /68 04/04/22 1151   Temp 98 04/04/22 1151   Pulse 78 04/04/22 1151   Resp 15 04/04/22 1151   SpO2 95 04/04/22 1151         No case tracking events are documented in the log.      Pain/Angel Score: No data recorded

## 2022-04-05 ENCOUNTER — TELEPHONE (OUTPATIENT)
Dept: ENDOSCOPY | Facility: HOSPITAL | Age: 77
End: 2022-04-05
Payer: MEDICARE

## 2022-04-05 NOTE — TELEPHONE ENCOUNTER
Post procedure f/u call, no answer, message left to call [phone] for any post procedure concerns

## 2022-04-11 LAB
COMMENT: NORMAL
FINAL PATHOLOGIC DIAGNOSIS: NORMAL
GROSS: NORMAL
Lab: NORMAL
MICROSCOPIC EXAM: NORMAL

## 2022-04-12 ENCOUNTER — TELEPHONE (OUTPATIENT)
Dept: ENDOSCOPY | Facility: HOSPITAL | Age: 77
End: 2022-04-12
Payer: MEDICARE

## 2022-04-12 DIAGNOSIS — K22.719 BARRETT'S ESOPHAGUS WITH DYSPLASIA: Primary | ICD-10-CM

## 2022-04-12 NOTE — TELEPHONE ENCOUNTER
----- Message from Dimitri Leary MD sent at 4/11/2022  6:24 PM CDT -----  Please let the patient know the biopsies of the esophagus showed small Horner's with one area at 38 cm that was indefinite for dysplasia. Repeat EGD in 3 months instead of 6 months.  Thanks,  Dimitri Leary MD

## 2022-04-12 NOTE — TELEPHONE ENCOUNTER
----- Message from Ariana Boland sent at 4/12/2022  3:06 PM CDT -----  Contact: [phone]  Pt wife calling to speak with Shaniqua. Pt wife would like a call back,.    [phone]

## 2022-04-12 NOTE — TELEPHONE ENCOUNTER
----- Message from Ariana Boland sent at 4/12/2022  3:06 PM CDT -----  Contact: [phone]  Pt wife calling to speak with Shaniqua. Pt wife would like a call back,.    [phone]

## 2022-05-19 ENCOUNTER — TELEPHONE (OUTPATIENT)
Dept: ENDOSCOPY | Facility: HOSPITAL | Age: 77
End: 2022-05-19
Payer: MEDICARE

## 2022-05-19 NOTE — TELEPHONE ENCOUNTER
Received request to schedule patient for EGD. Spoke with Patient's wife, Damari. Reviewed medical history and medications. Instructed on procedure and prep. Pt informed they may be moved to an earlier appointment time closer to day of procedure. Damari verbalized understanding of instructions. Copy of instructions sent to patient via portal and e-mail- [email or web address]

## 2022-07-12 ENCOUNTER — TELEPHONE (OUTPATIENT)
Dept: ENDOSCOPY | Facility: HOSPITAL | Age: 77
End: 2022-07-12
Payer: MEDICARE

## 2022-07-12 NOTE — TELEPHONE ENCOUNTER
Received message pt would like a call regarding upcoming procedure.  Telephoned pt with no answer.  Left voicemail message with my direct contact number for patient to return phone call.

## 2022-07-12 NOTE — TELEPHONE ENCOUNTER
----- Message from Vic Miller sent at 7/12/2022  7:58 AM CDT -----  Contact: [phone]  Who Called: PT  Regarding:  upcoming procedure   Would the patient rather a call back or a response via [facility]? Call back  Best Call Back Number: [phone]  Additional Information: n/a

## 2022-07-13 ENCOUNTER — TELEPHONE (OUTPATIENT)
Dept: ENDOSCOPY | Facility: HOSPITAL | Age: 77
End: 2022-07-13
Payer: MEDICARE

## 2022-07-13 NOTE — TELEPHONE ENCOUNTER
Pt telephoned to cancel upcoming EGD.  Pt recently underwent a stress test and is now scheduled for an angiogram next week in [place].  Pt removed from schedule.  Will follow up to reschedule once cleared by his cardiologist.

## 2022-07-15 ENCOUNTER — TELEPHONE (OUTPATIENT)
Dept: ENDOSCOPY | Facility: HOSPITAL | Age: 77
End: 2022-07-15
Payer: MEDICARE

## 2022-07-15 NOTE — TELEPHONE ENCOUNTER
Patient's wife called and cancelled EGD on Tuesday, 07/19/22, patient is having his heart angiogram next week and needs to recover before having this procedure. She will call back and reschedule once he is cleared from a cardiac standpoint. Message sent to [facility] scheduling team.

## 2022-07-15 NOTE — TELEPHONE ENCOUNTER
Left message instructing patient to call dept @ [phone] or [phone] between 8am-3pm.    Need to find out what is the plan for cardiac issues.  Arrival time to be given @ 0591  Covid - vacc

## 2022-10-06 ENCOUNTER — TELEPHONE (OUTPATIENT)
Dept: ENDOSCOPY | Facility: HOSPITAL | Age: 77
End: 2022-10-06
Payer: MEDICARE

## 2023-02-23 ENCOUNTER — TELEPHONE (OUTPATIENT)
Dept: ENDOSCOPY | Facility: HOSPITAL | Age: 78
End: 2023-02-23

## 2023-02-23 NOTE — TELEPHONE ENCOUNTER
----- Message from Giana Coker sent at 2023  9:03 AM CST -----  Regardinnd call  Contact: @[phone]  Pt is calling in to get an appt, please call to discuss further.

## 2023-02-28 ENCOUNTER — TELEPHONE (OUTPATIENT)
Dept: ENDOSCOPY | Facility: HOSPITAL | Age: 78
End: 2023-02-28

## 2023-03-17 ENCOUNTER — TELEPHONE (OUTPATIENT)
Dept: ENDOSCOPY | Facility: HOSPITAL | Age: 78
End: 2023-03-17

## 2023-03-17 NOTE — TELEPHONE ENCOUNTER
----- Message from Maximus Cortez sent at 3/17/2023  1:05 PM CDT -----  Contact: PT  Type:  Sooner Apoointment Request    Caller is requesting a sooner appointment.  Caller declined first available appointment listed below.  Caller will not accept being placed on the waitlist and is requesting a message be sent to doctor.  Name of Caller:PT   When is the first available appointment? N/A  Symptoms:READY TO SCHEDULE PROCEDURE   Would the patient rather a call back or a response via [facility]? CALL   Best Call Back Number:[phone]  Additional Information:

## 2023-04-24 ENCOUNTER — TELEPHONE (OUTPATIENT)
Dept: ENDOSCOPY | Facility: HOSPITAL | Age: 78
End: 2023-04-24
Payer: MEDICARE

## 2023-04-24 ENCOUNTER — PATIENT MESSAGE (OUTPATIENT)
Dept: ENDOSCOPY | Facility: HOSPITAL | Age: 78
End: 2023-04-24
Payer: MEDICARE

## 2023-04-24 NOTE — TELEPHONE ENCOUNTER
Telephoned pt to schedule EGD.  Spoke with pt and procedure scheduled for 5/24/23 at 10:15am.  Reviewed history and medications.  Instructed on procedure and preparation.  Pt verbalized understanding.  Instructions sent via patient portal.  Instructed pt on how to locate prep instructions within the portal.  Pt verbalized understanding.

## 2023-05-10 ENCOUNTER — PATIENT MESSAGE (OUTPATIENT)
Dept: ENDOSCOPY | Facility: HOSPITAL | Age: 78
End: 2023-05-10
Payer: MEDICARE

## 2023-05-24 ENCOUNTER — TELEPHONE (OUTPATIENT)
Dept: ENDOSCOPY | Facility: HOSPITAL | Age: 78
End: 2023-05-24
Payer: MEDICARE

## 2023-05-25 ENCOUNTER — TELEPHONE (OUTPATIENT)
Dept: ENDOSCOPY | Facility: HOSPITAL | Age: 78
End: 2023-05-25
Payer: MEDICARE

## 2023-05-25 NOTE — TELEPHONE ENCOUNTER
----- Message from Kamron Villarreal sent at 5/25/2023  9:20 AM CDT -----  Pt Requesting Call Back    Who called: pt  Who called for pt:  Best call back #: [phone]  Add notes: pt said he wants to know the time of his procedure and the time he have to be there because he is coming from out to [facility]

## 2023-05-26 ENCOUNTER — ANESTHESIA EVENT (OUTPATIENT)
Dept: ENDOSCOPY | Facility: HOSPITAL | Age: 78
End: 2023-05-26
Payer: MEDICARE

## 2023-05-26 ENCOUNTER — HOSPITAL ENCOUNTER (OUTPATIENT)
Facility: HOSPITAL | Age: 78
Discharge: HOME | End: 2023-05-26
Attending: INTERNAL MEDICINE | Admitting: INTERNAL MEDICINE
Payer: MEDICARE

## 2023-05-26 ENCOUNTER — ANESTHESIA (OUTPATIENT)
Dept: ENDOSCOPY | Facility: HOSPITAL | Age: 78
End: 2023-05-26
Payer: MEDICARE

## 2023-05-26 VITALS
HEIGHT: 71 IN | DIASTOLIC BLOOD PRESSURE: 68 MMHG | BODY MASS INDEX: 33.18 KG/M2 | WEIGHT: 237 LBS | SYSTOLIC BLOOD PRESSURE: 109 MMHG | RESPIRATION RATE: 16 BRPM | OXYGEN SATURATION: 99 % | TEMPERATURE: 98 F | HEART RATE: 71 BPM

## 2023-05-26 DIAGNOSIS — K22.719 BARRETT'S ESOPHAGUS WITH DYSPLASIA: Primary | ICD-10-CM

## 2023-05-26 PROCEDURE — D9220A PRA ANESTHESIA: ICD-10-PCS | Mod: CRNA,,, | Performed by: NURSE ANESTHETIST, CERTIFIED REGISTERED

## 2023-05-26 PROCEDURE — D9220A PRA ANESTHESIA: ICD-10-PCS | Mod: ANES,,, | Performed by: STUDENT IN AN ORGANIZED HEALTH CARE EDUCATION/TRAINING PROGRAM

## 2023-05-26 PROCEDURE — 37000008 HC ANESTHESIA 1ST 15 MINUTES: Performed by: INTERNAL MEDICINE

## 2023-05-26 PROCEDURE — 43270 EGD LESION ABLATION: CPT | Mod: ,,, | Performed by: INTERNAL MEDICINE

## 2023-05-26 PROCEDURE — 43270 PR EGD, FLEX, W/ABLATION, TUMOR/POLYP/LESION(S), W/ DILATION: ICD-10-PCS | Mod: ,,, | Performed by: INTERNAL MEDICINE

## 2023-05-26 PROCEDURE — 27202087 HC PROBE, APC: Performed by: INTERNAL MEDICINE

## 2023-05-26 PROCEDURE — D9220A PRA ANESTHESIA: Mod: ANES,,, | Performed by: STUDENT IN AN ORGANIZED HEALTH CARE EDUCATION/TRAINING PROGRAM

## 2023-05-26 PROCEDURE — 37000009 HC ANESTHESIA EA ADD 15 MINS: Performed by: INTERNAL MEDICINE

## 2023-05-26 PROCEDURE — D9220A PRA ANESTHESIA: Mod: CRNA,,, | Performed by: NURSE ANESTHETIST, CERTIFIED REGISTERED

## 2023-05-26 PROCEDURE — 25000003 PHARM REV CODE 250: Performed by: NURSE ANESTHETIST, CERTIFIED REGISTERED

## 2023-05-26 PROCEDURE — 43270 EGD LESION ABLATION: CPT | Performed by: INTERNAL MEDICINE

## 2023-05-26 PROCEDURE — 25000003 PHARM REV CODE 250: Performed by: INTERNAL MEDICINE

## 2023-05-26 PROCEDURE — 63600175 PHARM REV CODE 636 W HCPCS: Performed by: NURSE ANESTHETIST, CERTIFIED REGISTERED

## 2023-05-26 RX ORDER — SODIUM CHLORIDE 0.9 % (FLUSH) 0.9 %
10 SYRINGE (ML) INJECTION
Status: DISCONTINUED | OUTPATIENT
Start: 2023-05-26 | End: 2023-05-26 | Stop reason: HOSPADM

## 2023-05-26 RX ORDER — SODIUM CHLORIDE 9 MG/ML
INJECTION, SOLUTION INTRAVENOUS CONTINUOUS
Status: DISCONTINUED | OUTPATIENT
Start: 2023-05-26 | End: 2023-05-26 | Stop reason: HOSPADM

## 2023-05-26 RX ORDER — PROPOFOL 10 MG/ML
VIAL (ML) INTRAVENOUS CONTINUOUS PRN
Status: DISCONTINUED | OUTPATIENT
Start: 2023-05-26 | End: 2023-05-26

## 2023-05-26 RX ORDER — OMEPRAZOLE 20 MG/1
40 CAPSULE, DELAYED RELEASE ORAL
Qty: 120 CAPSULE | Refills: 2 | Status: SHIPPED | OUTPATIENT
Start: 2023-05-26 | End: 2023-06-09 | Stop reason: SDUPTHER

## 2023-05-26 RX ORDER — PROPOFOL 10 MG/ML
VIAL (ML) INTRAVENOUS
Status: DISCONTINUED | OUTPATIENT
Start: 2023-05-26 | End: 2023-05-26

## 2023-05-26 RX ORDER — ENALAPRIL MALEATE 5 MG/1
5 TABLET ORAL DAILY
COMMUNITY

## 2023-05-26 RX ORDER — LIDOCAINE HYDROCHLORIDE 20 MG/ML
INJECTION INTRAVENOUS
Status: DISCONTINUED | OUTPATIENT
Start: 2023-05-26 | End: 2023-05-26

## 2023-05-26 RX ORDER — SUCRALFATE 1 G/1
TABLET ORAL
Qty: 28 TABLET | Refills: 0 | Status: ON HOLD | OUTPATIENT
Start: 2023-05-26 | End: 2023-12-01 | Stop reason: SDUPTHER

## 2023-05-26 RX ADMIN — PROPOFOL 60 MG: 10 INJECTION, EMULSION INTRAVENOUS at 01:05

## 2023-05-26 RX ADMIN — PROPOFOL 20 MG: 10 INJECTION, EMULSION INTRAVENOUS at 01:05

## 2023-05-26 RX ADMIN — LIDOCAINE HYDROCHLORIDE 75 MG: 20 INJECTION, SOLUTION INTRAVENOUS at 01:05

## 2023-05-26 RX ADMIN — PROPOFOL 150 MCG/KG/MIN: 10 INJECTION, EMULSION INTRAVENOUS at 01:05

## 2023-05-26 RX ADMIN — SODIUM CHLORIDE: 0.9 INJECTION, SOLUTION INTRAVENOUS at 01:05

## 2023-05-26 NOTE — ANESTHESIA POSTPROCEDURE EVALUATION
Anesthesia Post Evaluation    Patient: Devon Berry    Procedure(s) Performed: Procedure(s) (LRB):  EGD (ESOPHAGOGASTRODUODENOSCOPY) (N/A)    Final Anesthesia Type: general      Patient location during evaluation: GI PACU  Patient participation: Yes- Able to Participate  Level of consciousness: awake and alert and oriented  Post-procedure vital signs: reviewed and stable  Pain management: adequate  Airway patency: patent    PONV status at discharge: No PONV  Anesthetic complications: no      Cardiovascular status: blood pressure returned to baseline  Respiratory status: unassisted, spontaneous ventilation and room air  Hydration status: euvolemic  Follow-up not needed.          Vitals Value Taken Time   /68 05/26/23 1430   Temp 36.7 °C (98.1 °F) 05/26/23 1400   Pulse 71 05/26/23 1430   Resp 16 05/26/23 1430   SpO2 99 % 05/26/23 1430         Event Time   Out of Recovery 14:30:00         Pain/Angel Score: Angel Score: 10 (5/26/2023  2:30 PM)

## 2023-05-26 NOTE — TRANSFER OF CARE
"Anesthesia Transfer of Care Note    Patient: Devon Berry    Procedure(s) Performed: Procedure(s) (LRB):  EGD (ESOPHAGOGASTRODUODENOSCOPY) (N/A)    Patient location: GI    Anesthesia Type: general    Transport from OR: Transported from OR on room air with adequate spontaneous ventilation    Post pain: adequate analgesia    Post assessment: no apparent anesthetic complications and tolerated procedure well    Post vital signs: stable    Level of consciousness: responds to stimulation and sedated    Nausea/Vomiting: no nausea/vomiting    Complications: none    Transfer of care protocol was followed      Last vitals:   Visit Vitals  /69 (BP Location: Left arm, Patient Position: Lying)   Pulse (!) 50   Temp 37.2 °C (99 °F) (Skin)   Resp 17   Ht 5' 11" (1.803 m)   Wt 107.5 kg (237 lb)   SpO2 (!) 94%   BMI 33.05 kg/m²     "

## 2023-05-26 NOTE — BRIEF OP NOTE
Discharge Summary/Instructions after an Endoscopic Procedure    Patient Name: Devon Berry  Patient MRN: [id number]  Patient YOB: 1945    Friday, May 26, 2023  Dimitri Leary MD  Dear patient,  As a result of recent federal legislation (The Federal Cures Act), you may receive lab or pathology results from your procedure in your [facility] account before your physician is able to contact you. Your physician or their representative will relay the results to you with their recommendations at their soonest availability.  Thank you,    Your health is very important to us during the Covid Crisis. Following your procedure today, you will receive a daily text for 2 weeks asking about signs or symptoms of Covid 19.  Please respond to this text when you receive it so we can follow up and keep you as safe as possible.     RESTRICTIONS:  During your procedure today, you received medications for sedation.  These medications may affect your judgment, balance and coordination.  Therefore, for 24 hours, you have the following restrictions:     - DO NOT drive a car, operate machinery, make legal/financial decisions, sign important papers or drink alcohol.      ACTIVITY:  Today: no heavy lifting, straining or running due to procedural sedation/anesthesia.  The following day: return to full activity including work.    DIET:  Eat and drink normally unless instructed otherwise.     TREATMENT FOR COMMON SIDE EFFECTS:  - Mild abdominal pain, nausea, belching, bloating or excessive gas:  rest, eat lightly and use a heating pad.  - Sore Throat: treat with throat lozenges and/or gargle with warm salt water.  - Because air was used during the procedure, expelling large amounts of air from your rectum or belching is normal.  - If a bowel prep was taken, you may not have a bowel movement for 1-3 days.  This is normal.      SYMPTOMS TO WATCH FOR AND REPORT TO YOUR PHYSICIAN:  1. Abdominal pain or bloating, other than gas  cramps.  2. Chest pain.  3. Back pain.  4. Signs of infection such as: chills or fever occurring within 24 hours after the procedure.  5. Rectal bleeding, which would show as bright red, maroon, or black stools. (A tablespoon of blood from the rectum is not serious, especially if hemorrhoids are present.)  6. Vomiting.  7. Weakness or dizziness.      GO DIRECTLY TO THE NEAREST EMERGENCY ROOM IF YOU HAVE ANY OF THE FOLLOWING:     Difficulty breathing              Chills and/or fever over 101 F   Persistent vomiting and/or vomiting blood   Severe abdominal pain   Severe chest pain   Black, tarry stools   Bleeding- more than one tablespoon   Any other symptom or condition that you feel may need urgent attention    Your doctor recommends these additional instructions:  If any biopsies were taken, your doctors clinic will contact you in 1 to 2 weeks with any results.    - Discharge patient to home (ambulatory).   - Resume previous diet.   - Use Prilosec (omeprazole) 40 mg PO BID.   - Use sucralfate suspension 1 gram PO BID for 2 weeks.   - Repeat upper endoscopy in 6 months for follow-up of Horner's ablation.    For questions, problems or results please call your physician - Dimitri Leary MD.    EMERGENCY PHONE NUMBER: [phone],  LAB RESULTS: [phone]    IF A COMPLICATION OR EMERGENCY SITUATION ARISES AND YOU ARE UNABLE TO REACH YOUR PHYSICIAN - GO DIRECTLY TO THE EMERGENCY ROOM.

## 2023-05-26 NOTE — H&P
History & Physical - Short Stay  Gastroenterology      SUBJECTIVE:     Procedure: EGD    Chief Complaint/Indication for Procedure: Horner's Esophagus    History of Present Illness:  Patient is a 77 y.o. male presents with Horner's with dysplasia S/P RFA here for follow up.     PTA Medications   Medication Sig    amiodarone (PACERONE) 200 MG Tab Take 100 mg by mouth once daily.    aspirin (ECOTRIN) 81 MG EC tablet Take 81 mg by mouth once daily.    atorvastatin (LIPITOR) 20 MG tablet Take 20 mg by mouth every evening.     enalapril (VASOTEC) 5 MG tablet Take 5 mg by mouth once daily.    ferrous sulfate (FEOSOL) 325 mg (65 mg iron) Tab tablet Take 325 mg by mouth 2 (two) times daily.     omeprazole (PRILOSEC) 20 MG capsule TAKE TWO CAPSULES BY MOUTH TWICE DAILY **THANK YOU**       Review of patient's allergies indicates:  No Known Allergies     Past Medical History:   Diagnosis Date    Horner's esophagus     CAD (coronary artery disease)     Colon cancer     colorectal cancer    Colon polyps     Diverticulosis of colon     GERD (gastroesophageal reflux disease)     Heartburn     Hemorrhoids     HTN (hypertension)     Hyperlipidemia     Pneumonia      Past Surgical History:   Procedure Laterality Date    CARDIAC SURGERY      COLONOSCOPY      COLONOSCOPY N/A 7/1/2021    Procedure: COLONOSCOPY;  Surgeon: Osmel Styles MD;  Location: [facility];  Service: Endoscopy;  Laterality: N/A;    CORONARY ARTERY BYPASS GRAFT  03/04/2016    3 vessels    CORONARY ARTERY BYPASS GRAFT      ESOPHAGOGASTRODUODENOSCOPY      ESOPHAGOGASTRODUODENOSCOPY N/A 1/17/2019    Procedure: EGD (ESOPHAGOGASTRODUODENOSCOPY);  Surgeon: Dimitri Leary MD;  Location: [address]);  Service: Endoscopy;  Laterality: N/A;  Outside cardiology records under media tab.    ESOPHAGOGASTRODUODENOSCOPY N/A 4/16/2019    Procedure: EGD (ESOPHAGOGASTRODUODENOSCOPY), Horner's;  Surgeon: Dimitri Leary MD;  Location: [facility];  Service: Endoscopy;   Laterality: N/A;    ESOPHAGOGASTRODUODENOSCOPY N/A 7/22/2019    Procedure: EGD (ESOPHAGOGASTRODUODENOSCOPY);  Surgeon: Dimitri Leary MD;  Location: [facility];  Service: Endoscopy;  Laterality: N/A;    ESOPHAGOGASTRODUODENOSCOPY N/A 11/8/2019    Procedure: EGD (ESOPHAGOGASTRODUODENOSCOPY);  Surgeon: Dimitri Leary MD;  Location: [facility];  Service: Endoscopy;  Laterality: N/A;    ESOPHAGOGASTRODUODENOSCOPY N/A 3/9/2020    Procedure: EGD (ESOPHAGOGASTRODUODENOSCOPY);  Surgeon: Dimitri Leary MD;  Location: [facility];  Service: Endoscopy;  Laterality: N/A;    ESOPHAGOGASTRODUODENOSCOPY N/A 8/21/2020    Procedure: EGD (ESOPHAGOGASTRODUODENOSCOPY);  Surgeon: Dimitri Leary MD;  Location: [facility];  Service: Endoscopy;  Laterality: N/A;  Horner's esophagus with dysplasia [K22.719]    ESOPHAGOGASTRODUODENOSCOPY N/A 4/9/2021    Procedure: EGD (ESOPHAGOGASTRODUODENOSCOPY);  Surgeon: Dimitri Laery MD;  Location: [facility];  Service: Endoscopy;  Laterality: N/A;  Needs Rapid MP    ESOPHAGOGASTRODUODENOSCOPY N/A 4/4/2022    Procedure: EGD (ESOPHAGOGASTRODUODENOSCOPY);  Surgeon: Dimitri Leary MD;  Location: [facility];  Service: Endoscopy;  Laterality: N/A;    HEMORRHOID SURGERY      LAPAROSCOPIC APPENDECTOMY N/A 4/29/2021    Procedure: APPENDECTOMY, LAPAROSCOPIC;  Surgeon: Osmel Styles MD;  Location: [facility] OR;  Service: General;  Laterality: N/A;    LAPAROSCOPIC LEFT COLON RESECTION      LAPAROSCOPIC LYSIS OF ADHESIONS N/A 4/29/2021    Procedure: LYSIS, ADHESIONS, LAPAROSCOPIC;  Surgeon: Osmel Styles MD;  Location: [facility] OR;  Service: General;  Laterality: N/A;    SIGMOIDECTOMY  2015    TONSILLECTOMY, ADENOIDECTOMY       Family History   Problem Relation Age of Onset    Heart disease Father      Social History     Tobacco Use    Smoking status: Every Day     Packs/day: 0.50     Types: Cigarettes    Smokeless tobacco: Never   Substance Use Topics    Alcohol use: Not Currently    Drug use: No        Review of Systems:  Constitutional: no fever or chills  Respiratory: no cough or shortness of breath  Cardiovascular: no chest pain or palpitations    OBJECTIVE:     Vital Signs (Most Recent)       Physical Exam:  General: well developed, well nourished  Lungs:  normal respiratory effort  Heart: regular rate, S1, S2 normal    Laboratory  CBC: No results for input(s): WBC, RBC, HGB, HCT, PLT, MCV, MCH, MCHC in the last 168 hours.  CMP: No results for input(s): GLU, CALCIUM, ALBUMIN, PROT, NA, K, CO2, CL, BUN, CREATININE, ALKPHOS, ALT, AST, BILITOT in the last 168 hours.  Coagulation: No results for input(s): LABPROT, INR, APTT in the last 168 hours.    Diagnostic Results:      ASSESSMENT/PLAN:     Horner's with dyplasia S/P RFA    Plan: EGD    Anesthesia Plan: MAC    ASA Grade: ASA 3 - Patient with moderate systemic disease with functional limitations      The impression and plan was discussed in detail with the patient. All questions have been answered and the patient voices understanding of our plan at this point. The risk of the procedure was discussed in detail which includes but not limited to bleeding, infection, perforation in some cases requiring surgery with its spectrum of complications.

## 2023-05-26 NOTE — ANESTHESIA PREPROCEDURE EVALUATION
05/26/2023  Devon Berry is a 77 y.o., male for EGD under MAC    Past Medical History:   Diagnosis Date    Horner's esophagus     CAD (coronary artery disease)     Colon cancer     colorectal cancer    Colon polyps     Diverticulosis of colon     GERD (gastroesophageal reflux disease)     Heartburn     Hemorrhoids     HTN (hypertension)     Hyperlipidemia     Pneumonia        Past Surgical History:   Procedure Laterality Date    CARDIAC SURGERY      COLONOSCOPY      COLONOSCOPY N/A 7/1/2021    Procedure: COLONOSCOPY;  Surgeon: Osmel Styles MD;  Location: [facility];  Service: Endoscopy;  Laterality: N/A;    CORONARY ARTERY BYPASS GRAFT  03/04/2016    3 vessels    CORONARY ARTERY BYPASS GRAFT      ESOPHAGOGASTRODUODENOSCOPY      ESOPHAGOGASTRODUODENOSCOPY N/A 1/17/2019    Procedure: EGD (ESOPHAGOGASTRODUODENOSCOPY);  Surgeon: Dimitri Leary MD;  Location: [facility] ([address] [place] [address]);  Service: Endoscopy;  Laterality: N/A;  Outside cardiology records under media tab.    ESOPHAGOGASTRODUODENOSCOPY N/A 4/16/2019    Procedure: EGD (ESOPHAGOGASTRODUODENOSCOPY), Horner's;  Surgeon: Dimitri Leary MD;  Location: [facility];  Service: Endoscopy;  Laterality: N/A;    ESOPHAGOGASTRODUODENOSCOPY N/A 7/22/2019    Procedure: EGD (ESOPHAGOGASTRODUODENOSCOPY);  Surgeon: Dimitri Leary MD;  Location: [facility];  Service: Endoscopy;  Laterality: N/A;    ESOPHAGOGASTRODUODENOSCOPY N/A 11/8/2019    Procedure: EGD (ESOPHAGOGASTRODUODENOSCOPY);  Surgeon: Dimitri Leary MD;  Location: [facility];  Service: Endoscopy;  Laterality: N/A;    ESOPHAGOGASTRODUODENOSCOPY N/A 3/9/2020    Procedure: EGD (ESOPHAGOGASTRODUODENOSCOPY);  Surgeon: Dimitri Leary MD;  Location: [facility];  Service: Endoscopy;  Laterality: N/A;    ESOPHAGOGASTRODUODENOSCOPY N/A 8/21/2020    Procedure: EGD  (ESOPHAGOGASTRODUODENOSCOPY);  Surgeon: Dimitri Leary MD;  Location: [facility] ENDO;  Service: Endoscopy;  Laterality: N/A;  Horner's esophagus with dysplasia [K22.719]    ESOPHAGOGASTRODUODENOSCOPY N/A 4/9/2021    Procedure: EGD (ESOPHAGOGASTRODUODENOSCOPY);  Surgeon: Dimitri Leary MD;  Location: [facility] ENDO;  Service: Endoscopy;  Laterality: N/A;  Needs Rapid MP    ESOPHAGOGASTRODUODENOSCOPY N/A 4/4/2022    Procedure: EGD (ESOPHAGOGASTRODUODENOSCOPY);  Surgeon: Dimitri Leary MD;  Location: [facility] ENDO;  Service: Endoscopy;  Laterality: N/A;    HEMORRHOID SURGERY      LAPAROSCOPIC APPENDECTOMY N/A 4/29/2021    Procedure: APPENDECTOMY, LAPAROSCOPIC;  Surgeon: Osmel Styles MD;  Location: [facility] OR;  Service: General;  Laterality: N/A;    LAPAROSCOPIC LEFT COLON RESECTION      LAPAROSCOPIC LYSIS OF ADHESIONS N/A 4/29/2021    Procedure: LYSIS, ADHESIONS, LAPAROSCOPIC;  Surgeon: Osmel Styles MD;  Location: [facility] OR;  Service: General;  Laterality: N/A;    SIGMOIDECTOMY  2015    TONSILLECTOMY, ADENOIDECTOMY       Past Medical History:   Diagnosis Date    Horner's esophagus     CAD (coronary artery disease)     Colon cancer     colorectal cancer    Colon polyps     Diverticulosis of colon     GERD (gastroesophageal reflux disease)     Heartburn     Hemorrhoids     HTN (hypertension)     Hyperlipidemia     Pneumonia    Sinus rhythm with 1st degree A-V block   Right bundle branch block   Abnormal ECG   No previous ECGs available   Confirmed by GURPREET SOTO MD ([id number]) on 5/2/2021 8:11:55 PM     Pre-op Assessment    I have reviewed the Patient Summary Reports.    I have reviewed the Nursing Notes. I have reviewed the NPO Status.   I have reviewed the Medications.     Review of Systems  Anesthesia Hx:  No problems with previous Anesthesia No airway issues noted on previous GA. History of prior surgery of interest to airway management or planning: heart surgery. Previous anesthesia: General   04/29/2021 Appendectomy with general anesthesia.  Procedure performed at an [facility]. Denies Family Hx of Anesthesia complications.   Denies Personal Hx of Anesthesia complications.   Social:  Smoker, Social Alcohol Use    Hematology/Oncology:  Hematology Normal        Cardiovascular:   Hypertension CAD  Dysrhythmias atrial fibrillation ECG has been reviewed. CAD, s/p CABG 2016, H/O parox A fib, on Amiodarone, controlled.   Pulmonary:  Pulmonary Normal    Renal/:  Renal/ Normal     Hepatic/GI:   GERD Horner's esophagus   Neurological:  Neurology Normal    Endocrine:  Endocrine Normal        Physical Exam  General:  Obesity      Airway/Jaw/Neck:  Airway Findings: Mouth Opening: Normal   Tongue: Normal   General Airway Assessment: Adult Mallampati: II  TM Distance: Normal, at least 6 cm   Jaw/Neck Findings:     Neck ROM: Normal ROM      Eyes/Ears/Nose:  EYES/EARS/NOSE FINDINGS: Normal   Dental:  Dental Findings: In tact     Chest/Lungs:  Chest/Lungs Clear    Heart/Vascular:  Heart Findings: Normal Heart murmur: negative Vascular Findings: Normal       Mental Status:  Mental Status Findings:  Cooperative, Alert and Oriented         Anesthesia Plan  Type of Anesthesia, risks & benefits discussed:  Anesthesia Type:  Gen Natural Airway    Patient's Preference:   Plan Factors:          Intra-op Monitoring Plan: standard ASA monitors  Intra-op Monitoring Plan Comments:   Post Op Pain Control Plan: per primary service following discharge from PACU  Post Op Pain Control Plan Comments:     Induction:   IV  Beta Blocker:  Patient is not currently on a Beta-Blocker (No further documentation required).       Informed Consent: Informed consent signed with the Patient and all parties understand the risks and agree with anesthesia plan.  All questions answered.  Anesthesia consent signed with patient.  ASA Score: 3     Day of Surgery Review of History & Physical:              Ready For Surgery From Anesthesia  Perspective.           Physical Exam  General: Obesity    Airway:  Mallampati: II   Mouth Opening: Normal  TM Distance: Normal, at least 6 cm  Tongue: Normal  Neck ROM: Normal ROM    Dental:  In tact          Anesthesia Plan  Type of Anesthesia, risks & benefits discussed:    Anesthesia Type: Gen Natural Airway  Intra-op Monitoring Plan: standard ASA monitors  Post Op Pain Control Plan: per primary service following discharge from PACU  Induction:  IV  Informed Consent: Informed consent signed with the Patient and all parties understand the risks and agree with anesthesia plan.  All questions answered.   ASA Score: 3    Ready For Surgery From Anesthesia Perspective.       .

## 2023-05-26 NOTE — [employer] PATIENT INSTRUCTIONS
Discharge Summary/Instructions after an Endoscopic Procedure  Patient Name: Devon Berry  Patient MRN: [id number]  Patient YOB: 1945  Friday, May 26, 2023  Dimitri Leary MD  Dear patient,  As a result of recent federal legislation (The Federal Cures Act), you may   receive lab or pathology results from your procedure in your [facility]   account before your physician is able to contact you. Your physician or   their representative will relay the results to you with their   recommendations at their soonest availability.  Thank you,  Your health is very important to us during the Covid Crisis. Following your   procedure today, you will receive a daily text for 2 weeks asking about   signs or symptoms of Covid 19.  Please respond to this text when you   receive it so we can follow up and keep you as safe as possible.   RESTRICTIONS:  During your procedure today, you received medications for sedation.  These   medications may affect your judgment, balance and coordination.  Therefore,   for 24 hours, you have the following restrictions:   - DO NOT drive a car, operate machinery, make legal/financial decisions,   sign important papers or drink alcohol.    ACTIVITY:  Today: no heavy lifting, straining or running due to procedural   sedation/anesthesia.  The following day: return to full activity including work.  DIET:  Eat and drink normally unless instructed otherwise.     TREATMENT FOR COMMON SIDE EFFECTS:  - Mild abdominal pain, nausea, belching, bloating or excessive gas:  rest,   eat lightly and use a heating pad.  - Sore Throat: treat with throat lozenges and/or gargle with warm salt   water.  - Because air was used during the procedure, expelling large amounts of air   from your rectum or belching is normal.  - If a bowel prep was taken, you may not have a bowel movement for 1-3 days.    This is normal.  SYMPTOMS TO WATCH FOR AND REPORT TO YOUR PHYSICIAN:  1. Abdominal pain or bloating, other than  gas cramps.  2. Chest pain.  3. Back pain.  4. Signs of infection such as: chills or fever occurring within 24 hours   after the procedure.  5. Rectal bleeding, which would show as bright red, maroon, or black stools.   (A tablespoon of blood from the rectum is not serious, especially if   hemorrhoids are present.)  6. Vomiting.  7. Weakness or dizziness.  GO DIRECTLY TO THE NEAREST EMERGENCY ROOM IF YOU HAVE ANY OF THE FOLLOWING:      Difficulty breathing              Chills and/or fever over 101 F   Persistent vomiting and/or vomiting blood   Severe abdominal pain   Severe chest pain   Black, tarry stools   Bleeding- more than one tablespoon   Any other symptom or condition that you feel may need urgent attention  Your doctor recommends these additional instructions:  If any biopsies were taken, your doctors clinic will contact you in 1 to 2   weeks with any results.  - Discharge patient to home (ambulatory).   - Resume previous diet.   - Use Prilosec (omeprazole) 40 mg PO BID.   - Use sucralfate suspension 1 gram PO BID for 2 weeks.   - Repeat upper endoscopy in 6 months for follow-up of Horner's ablation.  For questions, problems or results please call your physician - Dimitri Leary MD.  EMERGENCY PHONE NUMBER: [phone],  LAB RESULTS: [phone]  IF A COMPLICATION OR EMERGENCY SITUATION ARISES AND YOU ARE UNABLE TO REACH   YOUR PHYSICIAN - GO DIRECTLY TO THE EMERGENCY ROOM.  Dimitri Leary MD  5/26/2023 2:04:12 PM  This report has been verified and signed electronically.  Dear patient,  As a result of recent federal legislation (The Federal Cures Act), you may   receive lab or pathology results from your procedure in your [facility]   account before your physician is able to contact you. Your physician or   their representative will relay the results to you with their   recommendations at their soonest availability.  Thank you,  [employer]ATION

## 2023-05-27 ENCOUNTER — PATIENT MESSAGE (OUTPATIENT)
Dept: GASTROENTEROLOGY | Facility: CLINIC | Age: 78
End: 2023-05-27
Payer: MEDICARE

## 2023-06-09 ENCOUNTER — TELEPHONE (OUTPATIENT)
Dept: ENDOSCOPY | Facility: HOSPITAL | Age: 78
End: 2023-06-09
Payer: MEDICARE

## 2023-06-09 NOTE — TELEPHONE ENCOUNTER
----- Message from Kusum Lamb sent at 6/9/2023  9:36 AM CDT -----  Regarding: refill  Please refill the medication(s) listed below. Please call the patient when the prescription(s) is ready for  at this phone number             Medication #1 omeprazole (PRILOSEC) 20 MG capsule        Medication #2          Preferred Pharmacy:     [place] [address],   [phone] option 2   [phone] fax

## 2023-06-11 RX ORDER — OMEPRAZOLE 20 MG/1
40 CAPSULE, DELAYED RELEASE ORAL
Qty: 120 CAPSULE | Refills: 2 | Status: ON HOLD | OUTPATIENT
Start: 2023-06-11 | End: 2023-12-01 | Stop reason: SDUPTHER

## 2023-06-13 ENCOUNTER — TELEPHONE (OUTPATIENT)
Dept: ENDOSCOPY | Facility: HOSPITAL | Age: 78
End: 2023-06-13
Payer: MEDICARE

## 2023-06-13 NOTE — TELEPHONE ENCOUNTER
----- Message from Joanie Pierson sent at 6/13/2023  2:56 PM CDT -----  [facility]  calling regarding Refills for the omeprazole (PRILOSEC) 20 MG capsule, she stated that they called in several times but still have not received the prescription... The PT stated to the Pharmacy to request 40 mg twice a day instead of the 20 MG twice a day that the prescription is for, call back to Pharmacy if needed to clarify           [facility] - [place] - [address]  [address]  [place] [address]  Phone: [phone] Fax: [phone]

## 2023-06-15 ENCOUNTER — TELEPHONE (OUTPATIENT)
Dept: ENDOSCOPY | Facility: HOSPITAL | Age: 78
End: 2023-06-15
Payer: MEDICARE

## 2023-06-15 NOTE — TELEPHONE ENCOUNTER
----- Message from Ebony Arguello MD sent at 6/14/2023  5:50 PM CDT -----  Regarding: RE: Refill  Contact: [facility] [phone]  Yeah the 40mg tab twice daily is fine, as long as it's the same dose Dr. Leary wants. Just doesn't need to be two 20mg tabs - sounds like that is the issue.      ----- Message -----  From: Shaniqua Willis MA  Sent: 6/14/2023   3:52 PM CDT  To: Ebony Arguello MD  Subject: FW: Refill                                       You ok'd this last week. Is it ok to authorize the change?  [place]  ----- Message -----  From: Keily Gordon  Sent: 6/14/2023   1:55 PM CDT  To: Gibran [facility]ley V Staff, Ebony Arguello Staff  Subject: Refill                                           [facility] is calling about prescription Rx: omeprazole (PRILOSEC) 20 MG capsule 120 capsule states insurance wont pay for 2 pills a day but states rx comes in 40 mg please call       [facility] - [place] - [address]  [address]  [place] [address]  Phone: [phone] Fax: [phone]

## 2023-09-05 DIAGNOSIS — K22.70 BARRETT'S ESOPHAGUS WITHOUT DYSPLASIA: Primary | ICD-10-CM

## 2023-10-17 ENCOUNTER — PATIENT MESSAGE (OUTPATIENT)
Dept: ENDOSCOPY | Facility: HOSPITAL | Age: 78
End: 2023-10-17
Payer: MEDICARE

## 2023-10-17 ENCOUNTER — TELEPHONE (OUTPATIENT)
Dept: ENDOSCOPY | Facility: HOSPITAL | Age: 78
End: 2023-10-17
Payer: MEDICARE

## 2023-10-17 NOTE — TELEPHONE ENCOUNTER
Spoke to patient wife to schedule procedure(s) Upper Endoscopy (EGD)       Physician to perform procedure(s) Dr. SALMA Leary  Date of Procedure (s) 12/01/23  Arrival Time 10:15 AM  Time of Procedure(s) 11:15 AM   Location of Procedure(s) [facility] [address] [facility]  Type of Rx Prep sent to patient: Other  Instructions provided to patient via [facility]    Patient was informed on the following information and verbalized understanding. Screening questionnaire reviewed with patient and complete. If procedure requires anesthesia, a responsible adult needs to be present to accompany the patient home, patient cannot drive after receiving anesthesia. Appointment details are tentative, especially check-in time. Patient will receive a prep-op call 4 days prior to confirm check-in time for procedure. If applicable the patient should contact their pharmacy to verify Rx for procedure prep is ready for pick-up. Patient was advised to call the scheduling department at [phone] if pharmacy states no Rx is available. Patient was advised to call the endoscopy scheduling department if any questions or concerns arise.      [facility] Scheduling Department

## 2023-11-29 ENCOUNTER — TELEPHONE (OUTPATIENT)
Dept: ENDOSCOPY | Facility: HOSPITAL | Age: 78
End: 2023-11-29
Payer: MEDICARE

## 2023-11-29 NOTE — TELEPHONE ENCOUNTER
Spoke with patient about arrival time @ 1015.   EGD    Day Before Procedure 11/30/23      You may have a light evening meal.   No solid food after 7:00 pm.   Continue drinking clear liquids.        Day of the Procedure 12/01/23      You may have water/clear liquids until 4 hours before your procedure or as directed by the scheduling nurse 7:15 AM.   See below for list.     What You CANNOT do:   Do not drink milk or anything colored red.  Do not drink alcohol.  No gum chewing or candy morning of procedure     Liquids That Are OK to Drink:   Water  Sports drinks (Gatorade, Power-Aid)  Coffee or tea (no cream or nondairy creamer)  Clear juices without pulp (apple, white grape)  Gelatin desserts (no fruit or toppings)  Clear soda (sprite, coke, ginger ale)  Chicken broth (until 12 midnight the night before procedure)      Medications: Do not take Insulin or oral diabetic medications the day of the procedure.    Take as prescribed: heart, seizure and blood pressure medication in the morning with a sip of water (less than an ounce).  Take any breathing medications and bring inhalers to hospital with you.     Leave all valuables and jewelry at home. Wear comfortable clothes to procedure to change into hospital gown.   You cannot drive for 24 hours after your procedure because you will receive sedation for your procedure to make you comfortable.    A ride must be provided at discharge.

## 2023-11-30 ENCOUNTER — ANESTHESIA EVENT (OUTPATIENT)
Dept: ENDOSCOPY | Facility: HOSPITAL | Age: 78
End: 2023-11-30
Payer: MEDICARE

## 2023-11-30 NOTE — ANESTHESIA PREPROCEDURE EVALUATION
11/30/2023  Devon Berry is a 77 y.o., male.      Pre-op Assessment    I have reviewed the Patient Summary Reports.          Review of Systems  Anesthesia Hx:   History of prior surgery of interest to airway management or planning:             Cardiovascular:     Hypertension   CAD                  Coronary Artery Disease:                            Hypertension         Pulmonary:  Pneumonia                  Pulmonary Infection:  Pneumonia.     Hepatic/GI:     GERD      Gerd              Physical Exam  General: Well nourished, Cooperative, Alert and Oriented    Airway:  Mallampati: II   Mouth Opening: Normal  TM Distance: Normal  Tongue: Normal  Neck ROM: Normal ROM        Anesthesia Plan  Type of Anesthesia, risks & benefits discussed:    Anesthesia Type: Gen Natural Airway  Intra-op Monitoring Plan: Standard ASA Monitors  Post Op Pain Control Plan: multimodal analgesia  Induction:  IV  Informed Consent: Informed consent signed with the Patient and all parties understand the risks and agree with anesthesia plan.  All questions answered.   ASA Score: 3  Day of Surgery Review of History & Physical: H&P Update referred to the surgeon/provider.    Ready For Surgery From Anesthesia Perspective.     .

## 2023-12-01 ENCOUNTER — ANESTHESIA (OUTPATIENT)
Dept: ENDOSCOPY | Facility: HOSPITAL | Age: 78
End: 2023-12-01
Payer: MEDICARE

## 2023-12-01 ENCOUNTER — HOSPITAL ENCOUNTER (OUTPATIENT)
Facility: HOSPITAL | Age: 78
Discharge: HOME | End: 2023-12-01
Attending: INTERNAL MEDICINE | Admitting: INTERNAL MEDICINE
Payer: MEDICARE

## 2023-12-01 VITALS
BODY MASS INDEX: 31.15 KG/M2 | HEIGHT: 72 IN | SYSTOLIC BLOOD PRESSURE: 115 MMHG | WEIGHT: 230 LBS | RESPIRATION RATE: 13 BRPM | TEMPERATURE: 98 F | HEART RATE: 55 BPM | OXYGEN SATURATION: 95 % | DIASTOLIC BLOOD PRESSURE: 68 MMHG

## 2023-12-01 DIAGNOSIS — K22.710 BARRETT'S ESOPHAGUS WITH LOW GRADE DYSPLASIA: Primary | ICD-10-CM

## 2023-12-01 DIAGNOSIS — K22.719 BARRETT'S ESOPHAGUS WITH DYSPLASIA: Primary | ICD-10-CM

## 2023-12-01 PROCEDURE — C1886 CATHETER, ABLATION: HCPCS | Performed by: INTERNAL MEDICINE

## 2023-12-01 PROCEDURE — 43239 EGD BIOPSY SINGLE/MULTIPLE: CPT | Mod: 59,,, | Performed by: INTERNAL MEDICINE

## 2023-12-01 PROCEDURE — 88312 SPECIAL STAINS GROUP 1: CPT | Mod: 26,,, | Performed by: STUDENT IN AN ORGANIZED HEALTH CARE EDUCATION/TRAINING PROGRAM

## 2023-12-01 PROCEDURE — 88305 TISSUE EXAM BY PATHOLOGIST: CPT | Mod: 59 | Performed by: STUDENT IN AN ORGANIZED HEALTH CARE EDUCATION/TRAINING PROGRAM

## 2023-12-01 PROCEDURE — 88312 SPECIAL STAINS GROUP 1: CPT | Performed by: STUDENT IN AN ORGANIZED HEALTH CARE EDUCATION/TRAINING PROGRAM

## 2023-12-01 PROCEDURE — 88305 TISSUE EXAM BY PATHOLOGIST: CPT | Mod: 26,,, | Performed by: STUDENT IN AN ORGANIZED HEALTH CARE EDUCATION/TRAINING PROGRAM

## 2023-12-01 PROCEDURE — 25000003 PHARM REV CODE 250: Performed by: NURSE ANESTHETIST, CERTIFIED REGISTERED

## 2023-12-01 PROCEDURE — 27201012 HC FORCEPS, HOT/COLD, DISP: Performed by: INTERNAL MEDICINE

## 2023-12-01 PROCEDURE — 37000008 HC ANESTHESIA 1ST 15 MINUTES: Performed by: INTERNAL MEDICINE

## 2023-12-01 PROCEDURE — 43239 EGD BIOPSY SINGLE/MULTIPLE: CPT | Mod: 59 | Performed by: INTERNAL MEDICINE

## 2023-12-01 PROCEDURE — 43270 EGD LESION ABLATION: CPT | Mod: ,,, | Performed by: INTERNAL MEDICINE

## 2023-12-01 PROCEDURE — 43239 PR EGD, FLEX, W/BIOPSY, SGL/MULTI: ICD-10-PCS | Mod: 59,,, | Performed by: INTERNAL MEDICINE

## 2023-12-01 PROCEDURE — 63600175 PHARM REV CODE 636 W HCPCS: Performed by: NURSE ANESTHETIST, CERTIFIED REGISTERED

## 2023-12-01 PROCEDURE — D9220A PRA ANESTHESIA: ICD-10-PCS | Mod: ANES,,, | Performed by: ANESTHESIOLOGY

## 2023-12-01 PROCEDURE — 25000003 PHARM REV CODE 250: Performed by: INTERNAL MEDICINE

## 2023-12-01 PROCEDURE — D9220A PRA ANESTHESIA: Mod: CRNA,,, | Performed by: NURSE ANESTHETIST, CERTIFIED REGISTERED

## 2023-12-01 PROCEDURE — D9220A PRA ANESTHESIA: ICD-10-PCS | Mod: CRNA,,, | Performed by: NURSE ANESTHETIST, CERTIFIED REGISTERED

## 2023-12-01 PROCEDURE — 88305 TISSUE EXAM BY PATHOLOGIST: ICD-10-PCS | Mod: 26,,, | Performed by: STUDENT IN AN ORGANIZED HEALTH CARE EDUCATION/TRAINING PROGRAM

## 2023-12-01 PROCEDURE — 43270 EGD LESION ABLATION: CPT | Performed by: INTERNAL MEDICINE

## 2023-12-01 PROCEDURE — 88312 PR  SPECIAL STAINS,GROUP I: ICD-10-PCS | Mod: 26,,, | Performed by: STUDENT IN AN ORGANIZED HEALTH CARE EDUCATION/TRAINING PROGRAM

## 2023-12-01 PROCEDURE — D9220A PRA ANESTHESIA: Mod: ANES,,, | Performed by: ANESTHESIOLOGY

## 2023-12-01 PROCEDURE — 43270 PR EGD, FLEX, W/ABLATION, TUMOR/POLYP/LESION(S), W/ DILATION: ICD-10-PCS | Mod: ,,, | Performed by: INTERNAL MEDICINE

## 2023-12-01 PROCEDURE — 37000009 HC ANESTHESIA EA ADD 15 MINS: Performed by: INTERNAL MEDICINE

## 2023-12-01 RX ORDER — SODIUM CHLORIDE 0.9 % (FLUSH) 0.9 %
10 SYRINGE (ML) INJECTION
Status: DISCONTINUED | OUTPATIENT
Start: 2023-12-01 | End: 2023-12-01 | Stop reason: HOSPADM

## 2023-12-01 RX ORDER — DEXMEDETOMIDINE HYDROCHLORIDE 100 UG/ML
INJECTION, SOLUTION INTRAVENOUS
Status: DISCONTINUED | OUTPATIENT
Start: 2023-12-01 | End: 2023-12-01

## 2023-12-01 RX ORDER — OMEPRAZOLE 20 MG/1
40 CAPSULE, DELAYED RELEASE ORAL
Qty: 120 CAPSULE | Refills: 2 | Status: SHIPPED | OUTPATIENT
Start: 2023-12-01 | End: 2023-12-07 | Stop reason: SDUPTHER

## 2023-12-01 RX ORDER — PROPOFOL 10 MG/ML
VIAL (ML) INTRAVENOUS
Status: DISCONTINUED | OUTPATIENT
Start: 2023-12-01 | End: 2023-12-01

## 2023-12-01 RX ORDER — SODIUM CHLORIDE 9 MG/ML
INJECTION, SOLUTION INTRAVENOUS CONTINUOUS
Status: DISCONTINUED | OUTPATIENT
Start: 2023-12-01 | End: 2023-12-01 | Stop reason: HOSPADM

## 2023-12-01 RX ORDER — PROPOFOL 10 MG/ML
VIAL (ML) INTRAVENOUS CONTINUOUS PRN
Status: DISCONTINUED | OUTPATIENT
Start: 2023-12-01 | End: 2023-12-01

## 2023-12-01 RX ORDER — LIDOCAINE HYDROCHLORIDE 20 MG/ML
INJECTION INTRAVENOUS
Status: DISCONTINUED | OUTPATIENT
Start: 2023-12-01 | End: 2023-12-01

## 2023-12-01 RX ORDER — SUCRALFATE 1 G/1
TABLET ORAL
Qty: 28 TABLET | Refills: 0 | Status: SHIPPED | OUTPATIENT
Start: 2023-12-01 | End: 2023-12-07 | Stop reason: SDUPTHER

## 2023-12-01 RX ADMIN — LIDOCAINE HYDROCHLORIDE 100 MG: 20 INJECTION, SOLUTION INTRAVENOUS at 11:12

## 2023-12-01 RX ADMIN — SODIUM CHLORIDE: 9 INJECTION, SOLUTION INTRAVENOUS at 10:12

## 2023-12-01 RX ADMIN — GLYCOPYRROLATE 0.2 MG: 0.2 INJECTION, SOLUTION INTRAMUSCULAR; INTRAVITREAL at 11:12

## 2023-12-01 RX ADMIN — DEXMEDETOMIDINE HYDROCHLORIDE 8 MCG: 100 INJECTION, SOLUTION, CONCENTRATE INTRAVENOUS at 11:12

## 2023-12-01 RX ADMIN — PROPOFOL 150 MCG/KG/MIN: 10 INJECTION, EMULSION INTRAVENOUS at 11:12

## 2023-12-01 RX ADMIN — PROPOFOL 70 MG: 10 INJECTION, EMULSION INTRAVENOUS at 11:12

## 2023-12-01 NOTE — H&P
History & Physical - Short Stay  Gastroenterology      SUBJECTIVE:     Procedure: EGD    Chief Complaint/Indication for Procedure: Horner's Esophagus    History of Present Illness:  Patient is a 77 y.o. male presents with Horner's with LGD S/P RFA here for follow up.   PTA Medications   Medication Sig    amiodarone (PACERONE) 200 MG Tab Take 100 mg by mouth once daily.    aspirin (ECOTRIN) 81 MG EC tablet Take 81 mg by mouth once daily.    atorvastatin (LIPITOR) 80 MG tablet Take 80 mg by mouth every evening.    enalapril (VASOTEC) 5 MG tablet Take 5 mg by mouth once daily.    ferrous sulfate (FEOSOL) 325 mg (65 mg iron) Tab tablet Take 325 mg by mouth 2 (two) times daily.     omeprazole (PRILOSEC) 20 MG capsule Take 2 capsules (40 mg total) by mouth 2 (two) times daily before meals.    sucralfate (CARAFATE) 1 gram tablet 1 tablet dissolved in 10 ml of water orally twice a day.       Review of patient's allergies indicates:  No Known Allergies     Past Medical History:   Diagnosis Date    Horner's esophagus     CAD (coronary artery disease)     Colon cancer     colorectal cancer    Colon polyps     Diverticulosis of colon     GERD (gastroesophageal reflux disease)     Heartburn     Hemorrhoids     HTN (hypertension)     Hyperlipidemia     Pneumonia      Past Surgical History:   Procedure Laterality Date    CARDIAC SURGERY      COLONOSCOPY      COLONOSCOPY N/A 7/1/2021    Procedure: COLONOSCOPY;  Surgeon: Osmel Styles MD;  Location: [facility];  Service: Endoscopy;  Laterality: N/A;    CORONARY ARTERY BYPASS GRAFT  03/04/2016    3 vessels    CORONARY ARTERY BYPASS GRAFT      ESOPHAGOGASTRODUODENOSCOPY      ESOPHAGOGASTRODUODENOSCOPY N/A 1/17/2019    Procedure: EGD (ESOPHAGOGASTRODUODENOSCOPY);  Surgeon: Dimitri Leary MD;  Location: [address]);  Service: Endoscopy;  Laterality: N/A;  Outside cardiology records under media tab.    ESOPHAGOGASTRODUODENOSCOPY N/A 4/16/2019    Procedure: EGD  (ESOPHAGOGASTRODUODENOSCOPY), Horner's;  Surgeon: Dimitri Leary MD;  Location: [facility];  Service: Endoscopy;  Laterality: N/A;    ESOPHAGOGASTRODUODENOSCOPY N/A 7/22/2019    Procedure: EGD (ESOPHAGOGASTRODUODENOSCOPY);  Surgeon: Dimitri Leary MD;  Location: [facility];  Service: Endoscopy;  Laterality: N/A;    ESOPHAGOGASTRODUODENOSCOPY N/A 11/8/2019    Procedure: EGD (ESOPHAGOGASTRODUODENOSCOPY);  Surgeon: Dimitri Leary MD;  Location: [facility];  Service: Endoscopy;  Laterality: N/A;    ESOPHAGOGASTRODUODENOSCOPY N/A 3/9/2020    Procedure: EGD (ESOPHAGOGASTRODUODENOSCOPY);  Surgeon: Dimitri Leary MD;  Location: [facility];  Service: Endoscopy;  Laterality: N/A;    ESOPHAGOGASTRODUODENOSCOPY N/A 8/21/2020    Procedure: EGD (ESOPHAGOGASTRODUODENOSCOPY);  Surgeon: Dimitri Leary MD;  Location: [facility];  Service: Endoscopy;  Laterality: N/A;  Horner's esophagus with dysplasia [K22.719]    ESOPHAGOGASTRODUODENOSCOPY N/A 4/9/2021    Procedure: EGD (ESOPHAGOGASTRODUODENOSCOPY);  Surgeon: Dimitri Leary MD;  Location: [facility];  Service: Endoscopy;  Laterality: N/A;  Needs Rapid MP    ESOPHAGOGASTRODUODENOSCOPY N/A 4/4/2022    Procedure: EGD (ESOPHAGOGASTRODUODENOSCOPY);  Surgeon: Dimitri Leary MD;  Location: [facility];  Service: Endoscopy;  Laterality: N/A;    ESOPHAGOGASTRODUODENOSCOPY N/A 5/26/2023    Procedure: EGD (ESOPHAGOGASTRODUODENOSCOPY);  Surgeon: Dimitri Leary MD;  Location: [facility];  Service: Endoscopy;  Laterality: N/A;  4/24/23-Instructions via portal-DS  5/10/23-Moved to 5/26/23 due to Dr. Leary's urgent ESD, updated instructions via portal-DS    HEMORRHOID SURGERY      LAPAROSCOPIC APPENDECTOMY N/A 4/29/2021    Procedure: APPENDECTOMY, LAPAROSCOPIC;  Surgeon: Osmel Styles MD;  Location: [facility];  Service: General;  Laterality: N/A;    LAPAROSCOPIC LEFT COLON RESECTION      LAPAROSCOPIC LYSIS OF ADHESIONS N/A 4/29/2021    Procedure: LYSIS, ADHESIONS,  "LAPAROSCOPIC;  Surgeon: Osmel Styles MD;  Location: [facility];  Service: General;  Laterality: N/A;    SIGMOIDECTOMY  2015    TONSILLECTOMY, ADENOIDECTOMY       Family History   Problem Relation Age of Onset    Heart disease Father      Social History     Tobacco Use    Smoking status: Every Day     Current packs/day: 0.50     Types: Cigarettes    Smokeless tobacco: Never   Substance Use Topics    Alcohol use: Not Currently     Comment: 3-4 per month    Drug use: No       Review of Systems:  Constitutional: no fever or chills  Respiratory: no cough or shortness of breath  Cardiovascular: no chest pain or palpitations    OBJECTIVE:     Vital Signs (Most Recent)  Temp: 98.2 °F (36.8 °C) (12/01/23 1043)  Pulse: (!) 59 (12/01/23 1043)  Resp: 17 (12/01/23 1043)  BP: 106/60 (12/01/23 1043)  SpO2: (!) 94 % (12/01/23 1043)    Physical Exam:  General: well developed, well nourished  Lungs:  normal respiratory effort  Heart: regular rate, S1, S2 normal    Laboratory  CBC: No results for input(s): "WBC", "RBC", "HGB", "HCT", "PLT", "MCV", "MCH", "MCHC" in the last 168 hours.  CMP: No results for input(s): "GLU", "CALCIUM", "ALBUMIN", "PROT", "NA", "K", "CO2", "CL", "BUN", "CREATININE", "ALKPHOS", "ALT", "AST", "BILITOT" in the last 168 hours.  Coagulation: No results for input(s): "LABPROT", "INR", "APTT" in the last 168 hours.    Diagnostic Results:      ASSESSMENT/PLAN:     Horner's with LGD    Plan: EGD    Anesthesia Plan: MAC    ASA Grade: ASA 3 - Patient with moderate systemic disease with functional limitations    The impression and plan was discussed in detail with the patient and family. All questions have been answered and the patient voices understanding of our plan at this point. The risk of the procedure was discussed in detail which includes but not limited to bleeding, infection, perforation in some cases requiring surgery with its spectrum of complications.   "

## 2023-12-01 NOTE — BRIEF OP NOTE
Discharge Summary/Instructions after an Endoscopic Procedure    Patient Name: Devon Berry  Patient MRN: [id number]  Patient YOB: 1945 Friday, December 1, 2023  Dimitri Leary MD  Dear patient,  As a result of recent federal legislation (The Federal Cures Act), you may receive lab or pathology results from your procedure in your [facility] account before your physician is able to contact you. Your physician or their representative will relay the results to you with their recommendations at their soonest availability.  Thank you,    Your health is very important to us during the Covid Crisis. Following your procedure today, you will receive a daily text for 2 weeks asking about signs or symptoms of Covid 19.  Please respond to this text when you receive it so we can follow up and keep you as safe as possible.     RESTRICTIONS:  During your procedure today, you received medications for sedation.  These medications may affect your judgment, balance and coordination.  Therefore, for 24 hours, you have the following restrictions:     - DO NOT drive a car, operate machinery, make legal/financial decisions, sign important papers or drink alcohol.      ACTIVITY:  Today: no heavy lifting, straining or running due to procedural sedation/anesthesia.  The following day: return to full activity including work.    DIET:  Eat and drink normally unless instructed otherwise.     TREATMENT FOR COMMON SIDE EFFECTS:  - Mild abdominal pain, nausea, belching, bloating or excessive gas:  rest, eat lightly and use a heating pad.  - Sore Throat: treat with throat lozenges and/or gargle with warm salt water.  - Because air was used during the procedure, expelling large amounts of air from your rectum or belching is normal.  - If a bowel prep was taken, you may not have a bowel movement for 1-3 days.  This is normal.      SYMPTOMS TO WATCH FOR AND REPORT TO YOUR PHYSICIAN:  1. Abdominal pain or bloating, other than gas  cramps.  2. Chest pain.  3. Back pain.  4. Signs of infection such as: chills or fever occurring within 24 hours after the procedure.  5. Rectal bleeding, which would show as bright red, maroon, or black stools. (A tablespoon of blood from the rectum is not serious, especially if hemorrhoids are present.)  6. Vomiting.  7. Weakness or dizziness.      GO DIRECTLY TO THE NEAREST EMERGENCY ROOM IF YOU HAVE ANY OF THE FOLLOWING:     Difficulty breathing              Chills and/or fever over 101 F   Persistent vomiting and/or vomiting blood   Severe abdominal pain   Severe chest pain   Black, tarry stools   Bleeding- more than one tablespoon   Any other symptom or condition that you feel may need urgent attention    Your doctor recommends these additional instructions:  If any biopsies were taken, your doctors clinic will contact you in 1 to 2 weeks with any results.    - Discharge patient to home (ambulatory).   - Resume previous diet.   - Await pathology results.   - Use Prilosec (omeprazole) 40 mg PO BID.   - Use sucralfate suspension 1 gram PO BID.   - Repeat upper endoscopy in 6 months for follow-up of Horner's ablation.    For questions, problems or results please call your physician - Dimitri Leary MD.    EMERGENCY PHONE NUMBER: [phone],  LAB RESULTS: [phone]    IF A COMPLICATION OR EMERGENCY SITUATION ARISES AND YOU ARE UNABLE TO REACH YOUR PHYSICIAN - GO DIRECTLY TO THE EMERGENCY ROOM.

## 2023-12-01 NOTE — ANESTHESIA POSTPROCEDURE EVALUATION
Anesthesia Post Evaluation    Patient: Devon Berry    Procedure(s) Performed: Procedure(s) (LRB):  EGD (ESOPHAGOGASTRODUODENOSCOPY) (N/A)    Final Anesthesia Type: general      Patient location during evaluation: GI PACU  Patient participation: Yes- Able to Participate  Level of consciousness: awake and alert and oriented  Post-procedure vital signs: reviewed and stable  Pain management: adequate  Airway patency: patent    PONV status at discharge: No PONV  Anesthetic complications: no      Cardiovascular status: blood pressure returned to baseline  Respiratory status: unassisted and spontaneous ventilation  Hydration status: euvolemic  Follow-up not needed.              Vitals Value Taken Time   /68 12/01/23 1157   Temp 36.8 °C (98.2 °F) 12/01/23 1127   Pulse 55 12/01/23 1157   Resp 13 12/01/23 1157   SpO2 95 % 12/01/23 1157         Event Time   Out of Recovery 11:58:03         Pain/Angel Score: Angel Score: 10 (12/1/2023 11:57 AM)

## 2023-12-01 NOTE — TRANSFER OF CARE
"Anesthesia Transfer of Care Note    Patient: Devon Berry    Procedure(s) Performed: Procedure(s) (LRB):  EGD (ESOPHAGOGASTRODUODENOSCOPY) (N/A)    Patient location: GI    Anesthesia Type: general    Transport from OR: Transported from OR on room air with adequate spontaneous ventilation    Post pain: adequate analgesia    Post assessment: no apparent anesthetic complications    Post vital signs: stable    Level of consciousness: awake, alert and oriented    Nausea/Vomiting: no nausea/vomiting    Complications: none    Transfer of care protocol was followed      Last vitals: Visit Vitals  /60 (Patient Position: Lying)   Pulse (!) 59   Temp 36.8 °C (98.2 °F) (Temporal)   Resp 17   Ht 5' 11.75" (1.822 m)   Wt 104.3 kg (230 lb)   SpO2 (!) 94%   BMI 31.41 kg/m²     "

## 2023-12-01 NOTE — [employer] PATIENT INSTRUCTIONS
Discharge Summary/Instructions after an Endoscopic Procedure  Patient Name: Devon Berry  Patient MRN: [id number]  Patient YOB: 1945 Friday, December 1, 2023  Dimitri Leary MD  Dear patient,  As a result of recent federal legislation (The Federal Cures Act), you may   receive lab or pathology results from your procedure in your [facility]   account before your physician is able to contact you. Your physician or   their representative will relay the results to you with their   recommendations at their soonest availability.  Thank you,  Your health is very important to us during the Covid Crisis. Following your   procedure today, you will receive a daily text for 2 weeks asking about   signs or symptoms of Covid 19.  Please respond to this text when you   receive it so we can follow up and keep you as safe as possible.   RESTRICTIONS:  During your procedure today, you received medications for sedation.  These   medications may affect your judgment, balance and coordination.  Therefore,   for 24 hours, you have the following restrictions:   - DO NOT drive a car, operate machinery, make legal/financial decisions,   sign important papers or drink alcohol.    ACTIVITY:  Today: no heavy lifting, straining or running due to procedural   sedation/anesthesia.  The following day: return to full activity including work.  DIET:  Eat and drink normally unless instructed otherwise.     TREATMENT FOR COMMON SIDE EFFECTS:  - Mild abdominal pain, nausea, belching, bloating or excessive gas:  rest,   eat lightly and use a heating pad.  - Sore Throat: treat with throat lozenges and/or gargle with warm salt   water.  - Because air was used during the procedure, expelling large amounts of air   from your rectum or belching is normal.  - If a bowel prep was taken, you may not have a bowel movement for 1-3 days.    This is normal.  SYMPTOMS TO WATCH FOR AND REPORT TO YOUR PHYSICIAN:  1. Abdominal pain or bloating, other  than gas cramps.  2. Chest pain.  3. Back pain.  4. Signs of infection such as: chills or fever occurring within 24 hours   after the procedure.  5. Rectal bleeding, which would show as bright red, maroon, or black stools.   (A tablespoon of blood from the rectum is not serious, especially if   hemorrhoids are present.)  6. Vomiting.  7. Weakness or dizziness.  GO DIRECTLY TO THE NEAREST EMERGENCY ROOM IF YOU HAVE ANY OF THE FOLLOWING:      Difficulty breathing              Chills and/or fever over 101 F   Persistent vomiting and/or vomiting blood   Severe abdominal pain   Severe chest pain   Black, tarry stools   Bleeding- more than one tablespoon   Any other symptom or condition that you feel may need urgent attention  Your doctor recommends these additional instructions:  If any biopsies were taken, your doctors clinic will contact you in 1 to 2   weeks with any results.  - Discharge patient to home (ambulatory).   - Resume previous diet.   - Await pathology results.   - Use Prilosec (omeprazole) 40 mg PO BID.   - Use sucralfate suspension 1 gram PO BID.   - Repeat upper endoscopy in 6 months for follow-up of Horner's ablation.  For questions, problems or results please call your physician - Dimitri Leary MD.  EMERGENCY PHONE NUMBER: [phone],  LAB RESULTS: [phone]  IF A COMPLICATION OR EMERGENCY SITUATION ARISES AND YOU ARE UNABLE TO REACH   YOUR PHYSICIAN - GO DIRECTLY TO THE EMERGENCY ROOM.  Dimitri Leary MD  12/1/2023 11:33:48 AM  This report has been verified and signed electronically.  Dear patient,  As a result of recent federal legislation (The Federal Cures Act), you may   receive lab or pathology results from your procedure in your [facility]   account before your physician is able to contact you. Your physician or   their representative will relay the results to you with their   recommendations at their soonest availability.  Thank you,  [employer]

## 2023-12-06 ENCOUNTER — TELEPHONE (OUTPATIENT)
Dept: GASTROENTEROLOGY | Facility: CLINIC | Age: 78
End: 2023-12-06
Payer: MEDICARE

## 2023-12-06 NOTE — TELEPHONE ENCOUNTER
----- Message from Ray Leavitt sent at 12/6/2023 12:04 PM CST -----  Regarding: Send Over RX order  Hi Pt's mail order Pharmacy called to ask for the   omeprazole (PRILOSEC) 20 MG capsule (pt says it is 40mg twice daily) Pls update the order, if needed  Pls send the order for future refill to :      [facility] (Mail Order) - [place] - [address]  [address]  [place] [address]  Phone: [phone] Fax: [phone]   Thank you.

## 2023-12-07 ENCOUNTER — TELEPHONE (OUTPATIENT)
Dept: ENDOSCOPY | Facility: HOSPITAL | Age: 78
End: 2023-12-07
Payer: MEDICARE

## 2023-12-07 ENCOUNTER — PATIENT MESSAGE (OUTPATIENT)
Dept: ENDOSCOPY | Facility: HOSPITAL | Age: 78
End: 2023-12-07
Payer: MEDICARE

## 2023-12-07 DIAGNOSIS — K22.719 BARRETT'S ESOPHAGUS WITH DYSPLASIA: Primary | ICD-10-CM

## 2023-12-07 DIAGNOSIS — K22.710 BARRETT'S ESOPHAGUS WITH LOW GRADE DYSPLASIA: ICD-10-CM

## 2023-12-07 RX ORDER — OMEPRAZOLE 20 MG/1
40 CAPSULE, DELAYED RELEASE ORAL
Qty: 120 CAPSULE | Refills: 2 | OUTPATIENT
Start: 2023-12-07

## 2023-12-07 RX ORDER — SUCRALFATE 1 G/1
TABLET ORAL
Qty: 28 TABLET | Refills: 0 | OUTPATIENT
Start: 2023-12-07 | End: 2024-03-13 | Stop reason: SDUPTHER

## 2023-12-08 LAB
FINAL PATHOLOGIC DIAGNOSIS: NORMAL
GROSS: NORMAL
Lab: NORMAL

## 2024-03-07 ENCOUNTER — TELEPHONE (OUTPATIENT)
Dept: GASTROENTEROLOGY | Facility: CLINIC | Age: 79
End: 2024-03-07
Payer: MEDICARE

## 2024-03-07 NOTE — TELEPHONE ENCOUNTER
----- Message from Ray Leavitt sent at 3/7/2024 12:09 PM CST -----  Regarding: Appt requested  Contact: [phone]  Devon Berry calling regarding Appointment Access  (message) for # Hi, pt called to request a call back to schedule an appt for same issue he has been having and seeing Dr. Leary for. PLs call the pt at  [phone] to discuss.  Thank you.

## 2024-03-11 ENCOUNTER — TELEPHONE (OUTPATIENT)
Dept: ENDOSCOPY | Facility: HOSPITAL | Age: 79
End: 2024-03-11
Payer: MEDICARE

## 2024-03-13 ENCOUNTER — TELEPHONE (OUTPATIENT)
Dept: ENDOSCOPY | Facility: HOSPITAL | Age: 79
End: 2024-03-13
Payer: MEDICARE

## 2024-03-13 DIAGNOSIS — K22.710 BARRETT'S ESOPHAGUS WITH LOW GRADE DYSPLASIA: ICD-10-CM

## 2024-03-13 RX ORDER — SUCRALFATE 1 G/1
TABLET ORAL
Qty: 90 TABLET | Refills: 2 | OUTPATIENT
Start: 2024-03-13 | End: 2024-03-13

## 2024-03-13 NOTE — TELEPHONE ENCOUNTER
Incoming call from pt c/o burning sensation in esophagus. Per Dr. Leary last note repeat EGD 6mths (June 2024). Message sent to Mary Tomlinson regarding RX.

## 2024-03-14 DIAGNOSIS — K22.710 BARRETT'S ESOPHAGUS WITH LOW GRADE DYSPLASIA: Primary | ICD-10-CM

## 2024-03-14 RX ORDER — SUCRALFATE 1 G/1
1 TABLET ORAL 2 TIMES DAILY
Qty: 60 TABLET | Refills: 0 | Status: SHIPPED | OUTPATIENT
Start: 2024-03-14 | End: 2024-03-20

## 2024-03-18 ENCOUNTER — TELEPHONE (OUTPATIENT)
Dept: GASTROENTEROLOGY | Facility: CLINIC | Age: 79
End: 2024-03-18
Payer: MEDICARE

## 2024-03-18 NOTE — TELEPHONE ENCOUNTER
----- Message from Lesa Conner sent at 3/18/2024  8:19 AM CDT -----  Contact: [phone]  KEVIN PRETTY calling regarding Patient Advice (message) Pt asking for a call back to verity how he is suppose to be taking his meds sucralfate (CARAFATE) 1 gram tablet

## 2024-03-20 DIAGNOSIS — K22.710 BARRETT'S ESOPHAGUS WITH LOW GRADE DYSPLASIA: Primary | ICD-10-CM

## 2024-04-11 ENCOUNTER — TELEPHONE (OUTPATIENT)
Dept: ENDOSCOPY | Facility: HOSPITAL | Age: 79
End: 2024-04-11
Payer: MEDICARE

## 2024-04-11 ENCOUNTER — PATIENT MESSAGE (OUTPATIENT)
Dept: ENDOSCOPY | Facility: HOSPITAL | Age: 79
End: 2024-04-11
Payer: MEDICARE

## 2024-04-11 DIAGNOSIS — K22.710 BARRETT'S ESOPHAGUS WITH LOW GRADE DYSPLASIA: Primary | ICD-10-CM

## 2024-04-11 NOTE — TELEPHONE ENCOUNTER
Spoke to patient spouse Damari to schedule procedure(s) Upper Endoscopy (EGD)       Physician to perform procedure(s) Dr. SALMA Leary  Date of Procedure (s) 06/07/2024  Arrival Time 10:00 AM  Time of Procedure(s) 11:00 AM   Location of Procedure(s) [facility] 2nd Floor  Type of Rx Prep sent to patient: Other  Instructions provided to patient via [facility]    Patient was informed on the following information and verbalized understanding. Screening questionnaire reviewed with patient and complete. If procedure requires anesthesia, a responsible adult needs to be present to accompany the patient home, patient cannot drive after receiving anesthesia. Appointment details are tentative, especially check-in time. Patient will receive a prep-op call 7 days prior to confirm check-in time for procedure. If applicable the patient should contact their pharmacy to verify Rx for procedure prep is ready for pick-up. Patient was advised to call the scheduling department at [phone] if pharmacy states no Rx is available. Patient was advised to call the endoscopy scheduling department if any questions or concerns arise.       Endoscopy Scheduling Department

## 2024-05-06 ENCOUNTER — TELEPHONE (OUTPATIENT)
Dept: ENDOSCOPY | Facility: HOSPITAL | Age: 79
End: 2024-05-06
Payer: MEDICARE

## 2024-05-06 ENCOUNTER — PATIENT MESSAGE (OUTPATIENT)
Dept: ENDOSCOPY | Facility: HOSPITAL | Age: 79
End: 2024-05-06
Payer: MEDICARE

## 2024-05-06 NOTE — TELEPHONE ENCOUNTER
Spoke to patient to reschedule procedure(s) Upper Endoscopy (EGD)       Physician to perform procedure(s) Dr. SALMA Leary  Date of Procedure (s) 05/28/2024  Arrival Time 10:00 AM  Time of Procedure(s) 11:00 AM   Location of Procedure(s) [facility] 2nd Floor  Type of Rx Prep sent to patient: Other  Instructions provided to patient via [facility]    Patient was informed on the following information and verbalized understanding. Screening questionnaire reviewed with patient and complete. If procedure requires anesthesia, a responsible adult needs to be present to accompany the patient home, patient cannot drive after receiving anesthesia. Appointment details are tentative, especially check-in time. Patient will receive a prep-op call 7 days prior to confirm check-in time for procedure. If applicable the patient should contact their pharmacy to verify Rx for procedure prep is ready for pick-up. Patient was advised to call the scheduling department at [phone] if pharmacy states no Rx is available. Patient was advised to call the endoscopy scheduling department if any questions or concerns arise.       Endoscopy Scheduling Department

## 2024-05-21 ENCOUNTER — TELEPHONE (OUTPATIENT)
Dept: ENDOSCOPY | Facility: HOSPITAL | Age: 79
End: 2024-05-21
Payer: MEDICARE

## 2024-05-21 NOTE — TELEPHONE ENCOUNTER
Contacted Pt for endoscopy pre-call for upcoming procedure.  Pre-call complete, Pt does not have any further questions. Arrival time: 10:00AM

## 2024-05-28 ENCOUNTER — HOSPITAL ENCOUNTER (OUTPATIENT)
Facility: HOSPITAL | Age: 79
Discharge: HOME | End: 2024-05-28
Attending: INTERNAL MEDICINE | Admitting: INTERNAL MEDICINE
Payer: MEDICARE

## 2024-05-28 ENCOUNTER — ANESTHESIA (OUTPATIENT)
Dept: ENDOSCOPY | Facility: HOSPITAL | Age: 79
End: 2024-05-28
Payer: MEDICARE

## 2024-05-28 ENCOUNTER — ANESTHESIA EVENT (OUTPATIENT)
Dept: ENDOSCOPY | Facility: HOSPITAL | Age: 79
End: 2024-05-28
Payer: MEDICARE

## 2024-05-28 VITALS
HEIGHT: 72 IN | RESPIRATION RATE: 20 BRPM | OXYGEN SATURATION: 92 % | BODY MASS INDEX: 30.48 KG/M2 | DIASTOLIC BLOOD PRESSURE: 74 MMHG | SYSTOLIC BLOOD PRESSURE: 118 MMHG | WEIGHT: 225 LBS | HEART RATE: 63 BPM | TEMPERATURE: 98 F

## 2024-05-28 DIAGNOSIS — K22.719 BARRETT'S ESOPHAGUS WITH DYSPLASIA: Primary | ICD-10-CM

## 2024-05-28 PROCEDURE — D9220A PRA ANESTHESIA: Mod: CRNA,,, | Performed by: NURSE ANESTHETIST, CERTIFIED REGISTERED

## 2024-05-28 PROCEDURE — 88312 SPECIAL STAINS GROUP 1: CPT | Mod: 26,,, | Performed by: PATHOLOGY

## 2024-05-28 PROCEDURE — 88341 IMHCHEM/IMCYTCHM EA ADD ANTB: CPT | Mod: 26,,, | Performed by: PATHOLOGY

## 2024-05-28 PROCEDURE — 88341 IMHCHEM/IMCYTCHM EA ADD ANTB: CPT | Performed by: PATHOLOGY

## 2024-05-28 PROCEDURE — D9220A PRA ANESTHESIA: Mod: ANES,,, | Performed by: ANESTHESIOLOGY

## 2024-05-28 PROCEDURE — 88305 TISSUE EXAM BY PATHOLOGIST: CPT | Mod: 26,,, | Performed by: PATHOLOGY

## 2024-05-28 PROCEDURE — 37000008 HC ANESTHESIA 1ST 15 MINUTES: Performed by: INTERNAL MEDICINE

## 2024-05-28 PROCEDURE — 43270 EGD LESION ABLATION: CPT | Mod: ,,, | Performed by: INTERNAL MEDICINE

## 2024-05-28 PROCEDURE — 27201012 HC FORCEPS, HOT/COLD, DISP: Performed by: INTERNAL MEDICINE

## 2024-05-28 PROCEDURE — 63600175 PHARM REV CODE 636 W HCPCS: Performed by: NURSE ANESTHETIST, CERTIFIED REGISTERED

## 2024-05-28 PROCEDURE — 25000003 PHARM REV CODE 250: Performed by: INTERNAL MEDICINE

## 2024-05-28 PROCEDURE — 43239 EGD BIOPSY SINGLE/MULTIPLE: CPT | Mod: 59,,, | Performed by: INTERNAL MEDICINE

## 2024-05-28 PROCEDURE — 88305 TISSUE EXAM BY PATHOLOGIST: CPT | Performed by: PATHOLOGY

## 2024-05-28 PROCEDURE — 88342 IMHCHEM/IMCYTCHM 1ST ANTB: CPT | Performed by: PATHOLOGY

## 2024-05-28 PROCEDURE — 37000009 HC ANESTHESIA EA ADD 15 MINS: Performed by: INTERNAL MEDICINE

## 2024-05-28 PROCEDURE — 88312 SPECIAL STAINS GROUP 1: CPT | Performed by: PATHOLOGY

## 2024-05-28 PROCEDURE — 43270 EGD LESION ABLATION: CPT | Performed by: INTERNAL MEDICINE

## 2024-05-28 PROCEDURE — 88342 IMHCHEM/IMCYTCHM 1ST ANTB: CPT | Mod: 26,,, | Performed by: PATHOLOGY

## 2024-05-28 PROCEDURE — 43239 EGD BIOPSY SINGLE/MULTIPLE: CPT | Mod: 59 | Performed by: INTERNAL MEDICINE

## 2024-05-28 PROCEDURE — 25000003 PHARM REV CODE 250: Performed by: NURSE ANESTHETIST, CERTIFIED REGISTERED

## 2024-05-28 RX ORDER — LIDOCAINE HYDROCHLORIDE 20 MG/ML
INJECTION, SOLUTION EPIDURAL; INFILTRATION; INTRACAUDAL; PERINEURAL
Status: DISCONTINUED | OUTPATIENT
Start: 2024-05-28 | End: 2024-05-28

## 2024-05-28 RX ORDER — PROPOFOL 10 MG/ML
VIAL (ML) INTRAVENOUS
Status: DISCONTINUED | OUTPATIENT
Start: 2024-05-28 | End: 2024-05-28

## 2024-05-28 RX ORDER — PROPOFOL 10 MG/ML
VIAL (ML) INTRAVENOUS CONTINUOUS PRN
Status: DISCONTINUED | OUTPATIENT
Start: 2024-05-28 | End: 2024-05-28

## 2024-05-28 RX ORDER — SODIUM CHLORIDE 9 MG/ML
INJECTION, SOLUTION INTRAVENOUS CONTINUOUS
Status: DISCONTINUED | OUTPATIENT
Start: 2024-05-28 | End: 2024-05-28 | Stop reason: HOSPADM

## 2024-05-28 RX ORDER — SODIUM CHLORIDE 0.9 % (FLUSH) 0.9 %
10 SYRINGE (ML) INJECTION
Status: DISCONTINUED | OUTPATIENT
Start: 2024-05-28 | End: 2024-05-28 | Stop reason: HOSPADM

## 2024-05-28 RX ADMIN — PROPOFOL 150 MCG/KG/MIN: 10 INJECTION, EMULSION INTRAVENOUS at 11:05

## 2024-05-28 RX ADMIN — PROPOFOL 70 MG: 10 INJECTION, EMULSION INTRAVENOUS at 11:05

## 2024-05-28 RX ADMIN — TOPICAL ANESTHETIC 1 EACH: 200 SPRAY DENTAL; PERIODONTAL at 11:05

## 2024-05-28 RX ADMIN — LIDOCAINE HYDROCHLORIDE 50 MG: 20 INJECTION, SOLUTION EPIDURAL; INFILTRATION; INTRACAUDAL; PERINEURAL at 11:05

## 2024-05-28 RX ADMIN — GLYCOPYRROLATE 0.2 MG: 0.2 INJECTION, SOLUTION INTRAMUSCULAR; INTRAVITREAL at 11:05

## 2024-05-28 RX ADMIN — SODIUM CHLORIDE: 9 INJECTION, SOLUTION INTRAVENOUS at 10:05

## 2024-05-28 NOTE — BRIEF OP NOTE
Discharge Summary/Instructions after an Endoscopic Procedure    Patient Name: Devon Berry  Patient MRN: [id number]  Patient YOB: 1945    Tuesday, May 28, 2024  Dimitri Leary MD  Dear patient,  As a result of recent federal legislation (The Federal Cures Act), you may receive lab or pathology results from your procedure in your [facility] account before your physician is able to contact you. Your physician or their representative will relay the results to you with their recommendations at their soonest availability.  Thank you,    Your health is very important to us during the Covid Crisis. Following your procedure today, you will receive a daily text for 2 weeks asking about signs or symptoms of Covid 19.  Please respond to this text when you receive it so we can follow up and keep you as safe as possible.     RESTRICTIONS:  During your procedure today, you received medications for sedation.  These medications may affect your judgment, balance and coordination.  Therefore, for 24 hours, you have the following restrictions:     - DO NOT drive a car, operate machinery, make legal/financial decisions, sign important papers or drink alcohol.      ACTIVITY:  Today: no heavy lifting, straining or running due to procedural sedation/anesthesia.  The following day: return to full activity including work.    DIET:  Eat and drink normally unless instructed otherwise.     TREATMENT FOR COMMON SIDE EFFECTS:  - Mild abdominal pain, nausea, belching, bloating or excessive gas:  rest, eat lightly and use a heating pad.  - Sore Throat: treat with throat lozenges and/or gargle with warm salt water.  - Because air was used during the procedure, expelling large amounts of air from your rectum or belching is normal.  - If a bowel prep was taken, you may not have a bowel movement for 1-3 days.  This is normal.      SYMPTOMS TO WATCH FOR AND REPORT TO YOUR PHYSICIAN:  1. Abdominal pain or bloating, other than gas  cramps.  2. Chest pain.  3. Back pain.  4. Signs of infection such as: chills or fever occurring within 24 hours after the procedure.  5. Rectal bleeding, which would show as bright red, maroon, or black stools. (A tablespoon of blood from the rectum is not serious, especially if hemorrhoids are present.)  6. Vomiting.  7. Weakness or dizziness.      GO DIRECTLY TO THE NEAREST EMERGENCY ROOM IF YOU HAVE ANY OF THE FOLLOWING:     Difficulty breathing              Chills and/or fever over 101 F   Persistent vomiting and/or vomiting blood   Severe abdominal pain   Severe chest pain   Black, tarry stools   Bleeding- more than one tablespoon   Any other symptom or condition that you feel may need urgent attention    Your doctor recommends these additional instructions:  If any biopsies were taken, your doctors clinic will contact you in 1 to 2 weeks with any results.    - Discharge patient to home (ambulatory).   - Resume previous diet.   - Await pathology results.   - Repeat upper endoscopy in 1 year for follow-up of Horner's ablation and for surveillance based on pathology results.   - Use Prilosec (omeprazole) 40 mg PO daily.    For questions, problems or results please call your physician - Dimitri Leary MD.    EMERGENCY PHONE NUMBER: [phone],  LAB RESULTS: [phone]    IF A COMPLICATION OR EMERGENCY SITUATION ARISES AND YOU ARE UNABLE TO REACH YOUR PHYSICIAN - GO DIRECTLY TO THE EMERGENCY ROOM.

## 2024-05-28 NOTE — TRANSFER OF CARE
Anesthesia Transfer of Care Note    Patient: Devon Berry    Procedure(s) Performed: Procedure(s) (LRB):  EGD (ESOPHAGOGASTRODUODENOSCOPY) (N/A)    Patient location: GI    Anesthesia Type: general    Transport from OR: Transported from OR on room air with adequate spontaneous ventilation    Post pain: adequate analgesia    Post assessment: no apparent anesthetic complications and tolerated procedure well    Post vital signs: stable    Level of consciousness: awake and alert    Nausea/Vomiting: no nausea/vomiting    Complications: none    Transfer of care protocol was followed      Last vitals: Visit Vitals  /77 (BP Location: Left arm, Patient Position: Lying)   Pulse (!) 56   Temp 36.5 °C (97.7 °F) (Skin)   Resp 20   Ht 6' (1.829 m)   Wt 102.1 kg (225 lb)   SpO2 96%   BMI 30.52 kg/m²

## 2024-05-28 NOTE — PROVATION PATIENT INSTRUCTIONS
Discharge Summary/Instructions after an Endoscopic Procedure  Patient Name: Devon Berry  Patient MRN: [id number]  Patient YOB: 1945  Tuesday, May 28, 2024  Dimitri Leary MD  Dear patient,  As a result of recent federal legislation (The Federal Cures Act), you may   receive lab or pathology results from your procedure in your [facility]   account before your physician is able to contact you. Your physician or   their representative will relay the results to you with their   recommendations at their soonest availability.  Thank you,  Your health is very important to us during the Covid Crisis. Following your   procedure today, you will receive a daily text for 2 weeks asking about   signs or symptoms of Covid 19.  Please respond to this text when you   receive it so we can follow up and keep you as safe as possible.   RESTRICTIONS:  During your procedure today, you received medications for sedation.  These   medications may affect your judgment, balance and coordination.  Therefore,   for 24 hours, you have the following restrictions:   - DO NOT drive a car, operate machinery, make legal/financial decisions,   sign important papers or drink alcohol.    ACTIVITY:  Today: no heavy lifting, straining or running due to procedural   sedation/anesthesia.  The following day: return to full activity including work.  DIET:  Eat and drink normally unless instructed otherwise.     TREATMENT FOR COMMON SIDE EFFECTS:  - Mild abdominal pain, nausea, belching, bloating or excessive gas:  rest,   eat lightly and use a heating pad.  - Sore Throat: treat with throat lozenges and/or gargle with warm salt   water.  - Because air was used during the procedure, expelling large amounts of air   from your rectum or belching is normal.  - If a bowel prep was taken, you may not have a bowel movement for 1-3 days.    This is normal.  SYMPTOMS TO WATCH FOR AND REPORT TO YOUR PHYSICIAN:  1. Abdominal pain or bloating, other than  gas cramps.  2. Chest pain.  3. Back pain.  4. Signs of infection such as: chills or fever occurring within 24 hours   after the procedure.  5. Rectal bleeding, which would show as bright red, maroon, or black stools.   (A tablespoon of blood from the rectum is not serious, especially if   hemorrhoids are present.)  6. Vomiting.  7. Weakness or dizziness.  GO DIRECTLY TO THE NEAREST EMERGENCY ROOM IF YOU HAVE ANY OF THE FOLLOWING:      Difficulty breathing              Chills and/or fever over 101 F   Persistent vomiting and/or vomiting blood   Severe abdominal pain   Severe chest pain   Black, tarry stools   Bleeding- more than one tablespoon   Any other symptom or condition that you feel may need urgent attention  Your doctor recommends these additional instructions:  If any biopsies were taken, your doctors clinic will contact you in 1 to 2   weeks with any results.  - Discharge patient to home (ambulatory).   - Resume previous diet.   - Await pathology results.   - Repeat upper endoscopy in 1 year for follow-up of Horner's ablation and   for surveillance based on pathology results.   - Use Prilosec (omeprazole) 40 mg PO daily.  For questions, problems or results please call your physician - Dimitri Leary MD.  EMERGENCY PHONE NUMBER: [phone],  LAB RESULTS: [phone]  IF A COMPLICATION OR EMERGENCY SITUATION ARISES AND YOU ARE UNABLE TO REACH   YOUR PHYSICIAN - GO DIRECTLY TO THE EMERGENCY ROOM.  Dimitri Leary MD  5/28/2024 11:27:01 AM  This report has been verified and signed electronically.  Dear patient,  As a result of recent federal legislation (The Federal Cures Act), you may   receive lab or pathology results from your procedure in your [facility]   account before your physician is able to contact you. Your physician or   their representative will relay the results to you with their   recommendations at their soonest availability.  Thank you,  [employer]OVATION

## 2024-05-28 NOTE — ANESTHESIA PREPROCEDURE EVALUATION
05/28/2024  Devon Berry is a 78 y.o., male here for egd and possible rfa.       Pre-op Assessment    I have reviewed the Patient Summary Reports.    I have reviewed the NPO Status.   I have reviewed the Medications.     Review of Systems  Anesthesia Hx:  No problems with previous Anesthesia               Denies Personal Hx of Anesthesia complications.                    Social:  Smoker, No Alcohol Use       Cardiovascular:     Hypertension   CAD   CABG/stent            Developed a fib following his triple vessel cabg 6 years ago. No issues since then on amio. No new cp, sob                         Hepatic/GI:     GERD             Endocrine:        Obesity / BMI > 30      Physical Exam  General: Well nourished, Cooperative, Alert and Oriented    Airway:  Mallampati: II   Mouth Opening: Normal  TM Distance: Normal  Tongue: Normal  Neck ROM: Normal ROM    Dental:  Dentures, Caps / Implants  States his dentures are implanted and do not come out      Anesthesia Plan  Type of Anesthesia, risks & benefits discussed:    Anesthesia Type: Gen Natural Airway  Intra-op Monitoring Plan: Standard ASA Monitors  Post Op Pain Control Plan: multimodal analgesia  Induction:  IV  Informed Consent: Informed consent signed with the Patient and all parties understand the risks and agree with anesthesia plan.  All questions answered.   ASA Score: 3    Ready For Surgery From Anesthesia Perspective.     .

## 2024-05-28 NOTE — H&P
History & Physical - Short Stay  Gastroenterology      SUBJECTIVE:     Procedure: EGD    Chief Complaint/Indication for Procedure: Horner's Esophagus    History of Present Illness:  Patient is a 78 y.o. male presents with Horner's with LGD S/P RFA.   PTA Medications   Medication Sig    amiodarone (PACERONE) 200 MG Tab Take 100 mg by mouth once daily.    aspirin (ECOTRIN) 81 MG EC tablet Take 81 mg by mouth once daily.    atorvastatin (LIPITOR) 80 MG tablet Take 80 mg by mouth every evening.    enalapril (VASOTEC) 5 MG tablet Take 5 mg by mouth once daily.    ferrous sulfate (FEOSOL) 325 mg (65 mg iron) Tab tablet Take 325 mg by mouth 2 (two) times daily.     omeprazole (PRILOSEC) 20 MG capsule Take 2 capsules (40 mg total) by mouth 2 (two) times daily before meals.       Review of patient's allergies indicates:  No Known Allergies     Past Medical History:   Diagnosis Date    Horner's esophagus     CAD (coronary artery disease)     Colon cancer     colorectal cancer    Colon polyps     Diverticulosis of colon     GERD (gastroesophageal reflux disease)     Heartburn     Hemorrhoids     HTN (hypertension)     Hyperlipidemia     Pneumonia      Past Surgical History:   Procedure Laterality Date    CARDIAC SURGERY      COLONOSCOPY      COLONOSCOPY N/A 7/1/2021    Procedure: COLONOSCOPY;  Surgeon: Osmel Styles MD;  Location: [facility];  Service: Endoscopy;  Laterality: N/A;    CORONARY ARTERY BYPASS GRAFT  03/04/2016    3 vessels    CORONARY ARTERY BYPASS GRAFT      ESOPHAGOGASTRODUODENOSCOPY      ESOPHAGOGASTRODUODENOSCOPY N/A 1/17/2019    Procedure: EGD (ESOPHAGOGASTRODUODENOSCOPY);  Surgeon: Dimitri Leary MD;  Location: [address]);  Service: Endoscopy;  Laterality: N/A;  Outside cardiology records under media tab.    ESOPHAGOGASTRODUODENOSCOPY N/A 4/16/2019    Procedure: EGD (ESOPHAGOGASTRODUODENOSCOPY), Horner's;  Surgeon: Dimitri Leary MD;  Location: [facility];  Service: Endoscopy;   Laterality: N/A;    ESOPHAGOGASTRODUODENOSCOPY N/A 7/22/2019    Procedure: EGD (ESOPHAGOGASTRODUODENOSCOPY);  Surgeon: Dimitri Leary MD;  Location: [facility];  Service: Endoscopy;  Laterality: N/A;    ESOPHAGOGASTRODUODENOSCOPY N/A 11/8/2019    Procedure: EGD (ESOPHAGOGASTRODUODENOSCOPY);  Surgeon: Dimitri Leary MD;  Location: [facility];  Service: Endoscopy;  Laterality: N/A;    ESOPHAGOGASTRODUODENOSCOPY N/A 3/9/2020    Procedure: EGD (ESOPHAGOGASTRODUODENOSCOPY);  Surgeon: Dimitri Leary MD;  Location: [facility];  Service: Endoscopy;  Laterality: N/A;    ESOPHAGOGASTRODUODENOSCOPY N/A 8/21/2020    Procedure: EGD (ESOPHAGOGASTRODUODENOSCOPY);  Surgeon: Dimitri Leary MD;  Location: [facility];  Service: Endoscopy;  Laterality: N/A;  Horner's esophagus with dysplasia [K22.719]    ESOPHAGOGASTRODUODENOSCOPY N/A 4/9/2021    Procedure: EGD (ESOPHAGOGASTRODUODENOSCOPY);  Surgeon: Dimitri Leary MD;  Location: [facility];  Service: Endoscopy;  Laterality: N/A;  Needs Rapid MP    ESOPHAGOGASTRODUODENOSCOPY N/A 4/4/2022    Procedure: EGD (ESOPHAGOGASTRODUODENOSCOPY);  Surgeon: Dimitri Leary MD;  Location: [facility];  Service: Endoscopy;  Laterality: N/A;    ESOPHAGOGASTRODUODENOSCOPY N/A 5/26/2023    Procedure: EGD (ESOPHAGOGASTRODUODENOSCOPY);  Surgeon: Dimitri Leary MD;  Location: [facility];  Service: Endoscopy;  Laterality: N/A;  4/24/23-Instructions via portal-DS  5/10/23-Moved to 5/26/23 due to Dr. Leary's urgent ESD, updated instructions via portal-DS    ESOPHAGOGASTRODUODENOSCOPY N/A 12/1/2023    Procedure: EGD (ESOPHAGOGASTRODUODENOSCOPY);  Surgeon: Dimitri Leary MD;  [facility] ENDO;  Service: Endoscopy;  Laterality: N/A;  10/17/23 portal instr TT    HEMORRHOID SURGERY      LAPAROSCOPIC APPENDECTOMY N/A 4/29/2021    Procedure: APPENDECTOMY, LAPAROSCOPIC;  Surgeon: Osmel Styles MD;  Location: [facility] OR;  Service: General;  Laterality: N/A;    LAPAROSCOPIC LEFT COLON  "RESECTION      LAPAROSCOPIC LYSIS OF ADHESIONS N/A 4/29/2021    Procedure: LYSIS, ADHESIONS, LAPAROSCOPIC;  Surgeon: Osmel Styles MD;  Location: [facility];  Service: General;  Laterality: N/A;    SIGMOIDECTOMY  2015    TONSILLECTOMY, ADENOIDECTOMY       Family History   Problem Relation Name Age of Onset    Heart disease Father       Social History     Tobacco Use    Smoking status: Every Day     Current packs/day: 0.50     Types: Cigarettes    Smokeless tobacco: Never   Substance Use Topics    Alcohol use: Not Currently     Comment: 3-4 per month    Drug use: No       Review of Systems:  Constitutional: no fever or chills  Respiratory: no cough or shortness of breath  Cardiovascular: no chest pain or palpitations    OBJECTIVE:     Vital Signs (Most Recent)       Physical Exam:  General: well developed, well nourished  Lungs:  normal respiratory effort  Heart: regular rate, S1, S2 normal    Laboratory  CBC: No results for input(s): "WBC", "RBC", "HGB", "HCT", "PLT", "MCV", "MCH", "MCHC" in the last 168 hours.  CMP: No results for input(s): "GLU", "CALCIUM", "ALBUMIN", "PROT", "NA", "K", "CO2", "CL", "BUN", "CREATININE", "ALKPHOS", "ALT", "AST", "BILITOT" in the last 168 hours.  Coagulation: No results for input(s): "LABPROT", "INR", "APTT" in the last 168 hours.    Diagnostic Results:      ASSESSMENT/PLAN:     Horner's with LGD    Plan: EGD    Anesthesia Plan: MAC    ASA Grade: ASA 3 - Patient with moderate systemic disease with functional limitations    The impression and plan was discussed in detail with the patient and family. All questions have been answered and the patient voices understanding of our plan at this point. The risk of the procedure was discussed in detail which includes but not limited to bleeding, infection, perforation in some cases requiring surgery with its spectrum of complications.   "

## 2024-05-29 NOTE — ANESTHESIA POSTPROCEDURE EVALUATION
Anesthesia Post Evaluation    Patient: Devon Berry    Procedure(s) Performed: Procedure(s) (LRB):  EGD (ESOPHAGOGASTRODUODENOSCOPY) (N/A)    Final Anesthesia Type: general      Patient location during evaluation: GI PACU  Patient participation: Yes- Able to Participate  Level of consciousness: awake and alert, oriented and awake  Post-procedure vital signs: reviewed and stable  Pain management: adequate  Airway patency: patent    PONV status at discharge: No PONV  Anesthetic complications: no      Cardiovascular status: stable  Respiratory status: unassisted and room air  Hydration status: euvolemic  Follow-up not needed.              Vitals Value Taken Time   /74 05/28/24 1157   Temp 36.4 °C (97.5 °F) 05/28/24 1125   Pulse 63 05/28/24 1157   Resp 20 05/28/24 1157   SpO2 92 % 05/28/24 1157         Event Time   Out of Recovery 11:58:26         Pain/Angel Score: Angel Score: 10 (5/28/2024 11:57 AM)

## 2024-06-03 LAB
FINAL PATHOLOGIC DIAGNOSIS: NORMAL
GROSS: NORMAL
Lab: NORMAL

## 2024-09-04 ENCOUNTER — HOSPITAL ENCOUNTER (EMERGENCY)
Facility: HOSPITAL | Age: 79
Discharge: HOME | End: 2024-09-04
Attending: EMERGENCY MEDICINE
Payer: MEDICARE

## 2024-09-04 VITALS
HEART RATE: 72 BPM | DIASTOLIC BLOOD PRESSURE: 70 MMHG | RESPIRATION RATE: 18 BRPM | OXYGEN SATURATION: 100 % | WEIGHT: 228 LBS | TEMPERATURE: 97 F | BODY MASS INDEX: 30.92 KG/M2 | SYSTOLIC BLOOD PRESSURE: 125 MMHG

## 2024-09-04 DIAGNOSIS — R07.9 CHEST PAIN: ICD-10-CM

## 2024-09-04 DIAGNOSIS — R05.9 COUGHING: ICD-10-CM

## 2024-09-04 LAB
ALBUMIN SERPL BCP-MCNC: 3.8 G/DL (ref 3.5–5.2)
ALP SERPL-CCNC: 54 U/L (ref 55–135)
ALT SERPL W/O P-5'-P-CCNC: 19 U/L (ref 10–44)
ANION GAP SERPL CALC-SCNC: 10 MMOL/L (ref 8–16)
AST SERPL-CCNC: 17 U/L (ref 10–40)
BASOPHILS # BLD AUTO: 0.05 K/UL (ref 0–0.2)
BASOPHILS NFR BLD: 0.4 % (ref 0–1.9)
BILIRUB SERPL-MCNC: 0.9 MG/DL (ref 0.1–1)
BNP SERPL-MCNC: 59 PG/ML (ref 0–99)
BUN SERPL-MCNC: 14 MG/DL (ref 8–23)
CALCIUM SERPL-MCNC: 9.5 MG/DL (ref 8.7–10.5)
CHLORIDE SERPL-SCNC: 106 MMOL/L (ref 95–110)
CO2 SERPL-SCNC: 25 MMOL/L (ref 23–29)
CREAT SERPL-MCNC: 1.1 MG/DL (ref 0.5–1.4)
DIFFERENTIAL METHOD BLD: ABNORMAL
EOSINOPHIL # BLD AUTO: 0.3 K/UL (ref 0–0.5)
EOSINOPHIL NFR BLD: 2 % (ref 0–8)
ERYTHROCYTE [DISTWIDTH] IN BLOOD BY AUTOMATED COUNT: 14.6 % (ref 11.5–14.5)
EST. GFR  (NO RACE VARIABLE): >60 ML/MIN/1.73 M^2
GLUCOSE SERPL-MCNC: 116 MG/DL (ref 70–110)
HCT VFR BLD AUTO: 48.3 % (ref 40–54)
HGB BLD-MCNC: 15.8 G/DL (ref 14–18)
IMM GRANULOCYTES # BLD AUTO: 0.04 K/UL (ref 0–0.04)
IMM GRANULOCYTES NFR BLD AUTO: 0.3 % (ref 0–0.5)
LYMPHOCYTES # BLD AUTO: 2.4 K/UL (ref 1–4.8)
LYMPHOCYTES NFR BLD: 18.2 % (ref 18–48)
MCH RBC QN AUTO: 31.1 PG (ref 27–31)
MCHC RBC AUTO-ENTMCNC: 32.7 G/DL (ref 32–36)
MCV RBC AUTO: 95 FL (ref 82–98)
MONOCYTES # BLD AUTO: 0.8 K/UL (ref 0.3–1)
MONOCYTES NFR BLD: 6.1 % (ref 4–15)
NEUTROPHILS # BLD AUTO: 9.6 K/UL (ref 1.8–7.7)
NEUTROPHILS NFR BLD: 73 % (ref 38–73)
NRBC BLD-RTO: 0 /100 WBC
PLATELET # BLD AUTO: 211 K/UL (ref 150–450)
PMV BLD AUTO: 11 FL (ref 9.2–12.9)
POTASSIUM SERPL-SCNC: 4.3 MMOL/L (ref 3.5–5.1)
PROT SERPL-MCNC: 7 G/DL (ref 6–8.4)
RBC # BLD AUTO: 5.08 M/UL (ref 4.6–6.2)
SARS-COV-2 RDRP RESP QL NAA+PROBE: NEGATIVE
SODIUM SERPL-SCNC: 141 MMOL/L (ref 136–145)
WBC # BLD AUTO: 13.11 K/UL (ref 3.9–12.7)

## 2024-09-04 PROCEDURE — 83880 ASSAY OF NATRIURETIC PEPTIDE: CPT | Performed by: NURSE PRACTITIONER

## 2024-09-04 PROCEDURE — 85025 COMPLETE CBC W/AUTO DIFF WBC: CPT | Performed by: NURSE PRACTITIONER

## 2024-09-04 PROCEDURE — U0002 COVID-19 LAB TEST NON-CDC: HCPCS | Performed by: NURSE PRACTITIONER

## 2024-09-04 PROCEDURE — 80053 COMPREHEN METABOLIC PANEL: CPT | Performed by: NURSE PRACTITIONER

## 2024-09-04 PROCEDURE — 99285 EMERGENCY DEPT VISIT HI MDM: CPT | Mod: 25

## 2024-09-04 RX ORDER — PROMETHAZINE HYDROCHLORIDE AND DEXTROMETHORPHAN HYDROBROMIDE 6.25; 15 MG/5ML; MG/5ML
5 SYRUP ORAL EVERY 6 HOURS PRN
Qty: 120 ML | Refills: 0 | Status: SHIPPED | OUTPATIENT
Start: 2024-09-04 | End: 2024-09-14

## 2024-09-05 LAB
OHS QRS DURATION: 140 MS
OHS QTC CALCULATION: 486 MS

## 2024-09-05 NOTE — ED PROVIDER NOTES
Encounter Date: 9/4/2024       History     Chief Complaint   Patient presents with    Shortness of Breath     Reports SOB, chest congestion, and cough x 2 weeks. Seen at US and sent to ED for further eval from crx findings     Patient complains of cough for 2 weeks.  Sent by urgent care today for an x-ray that was reported to have either infiltrates or atelectasis.        Review of patient's allergies indicates:  No Known Allergies  Past Medical History:   Diagnosis Date    Horner's esophagus     CAD (coronary artery disease)     Colon cancer     colorectal cancer    Colon polyps     Diverticulosis of colon     GERD (gastroesophageal reflux disease)     Heartburn     Hemorrhoids     HTN (hypertension)     Hyperlipidemia     Pneumonia      Past Surgical History:   Procedure Laterality Date    CARDIAC SURGERY      COLONOSCOPY      COLONOSCOPY N/A 7/1/2021    Procedure: COLONOSCOPY;  Surgeon: Osmel Styles MD;  Location: [facility];  Service: Endoscopy;  Laterality: N/A;    CORONARY ARTERY BYPASS GRAFT  03/04/2016    3 vessels    CORONARY ARTERY BYPASS GRAFT      ESOPHAGOGASTRODUODENOSCOPY      ESOPHAGOGASTRODUODENOSCOPY N/A 1/17/2019    Procedure: EGD (ESOPHAGOGASTRODUODENOSCOPY);  Surgeon: Dimitri Leary MD;  Location: [address]);  Service: Endoscopy;  Laterality: N/A;  Outside cardiology records under media tab.    ESOPHAGOGASTRODUODENOSCOPY N/A 4/16/2019    Procedure: EGD (ESOPHAGOGASTRODUODENOSCOPY), Horner's;  Surgeon: Dimitri Leary MD;  Location: [facility];  Service: Endoscopy;  Laterality: N/A;    ESOPHAGOGASTRODUODENOSCOPY N/A 7/22/2019    Procedure: EGD (ESOPHAGOGASTRODUODENOSCOPY);  Surgeon: Dimitri Leary MD;  Location: [facility];  Service: Endoscopy;  Laterality: N/A;    ESOPHAGOGASTRODUODENOSCOPY N/A 11/8/2019    Procedure: EGD (ESOPHAGOGASTRODUODENOSCOPY);  Surgeon: Dimitri Leary MD;  Location: [facility];  Service: Endoscopy;  Laterality: N/A;    ESOPHAGOGASTRODUODENOSCOPY N/A  3/9/2020    Procedure: EGD (ESOPHAGOGASTRODUODENOSCOPY);  Surgeon: Dimitri Leary MD;  Location: [facility] END[facility];  Service: Endoscopy;  Laterality: N/A;    ESOPHAGOGASTRODUODENOSCOPY N/A 8/21/2020    Procedure: EGD (ESOPHAGOGASTRODUODENOSCOPY);  Surgeon: Dimitri Leary MD;  Location: [facility] ENDO;  Service: Endoscopy;  Laterality: N/A;  Horner's esophagus with dysplasia [K22.719]    ESOPHAGOGASTRODUODENOSCOPY N/A 4/9/2021    Procedure: EGD (ESOPHAGOGASTRODUODENOSCOPY);  Surgeon: Dimitri Leary MD;  Location: [facility] END[facility];  Service: Endoscopy;  Laterality: N/A;  Needs Rapid MP    ESOPHAGOGASTRODUODENOSCOPY N/A 4/4/2022    Procedure: EGD (ESOPHAGOGASTRODUODENOSCOPY);  Surgeon: Dimitri Leary MD;  Location: [facility] ENDO;  Service: Endoscopy;  Laterality: N/A;    ESOPHAGOGASTRODUODENOSCOPY N/A 5/26/2023    Procedure: EGD (ESOPHAGOGASTRODUODENOSCOPY);  Surgeon: Dimitri Leary MD;  Location: [facility];  Service: Endoscopy;  Laterality: N/A;  4/24/23-Instructions via portal-DS  5/10/23-Moved to 5/26/23 due to Dr. Leary's urgent ESD, updated instructions via portal-DS    ESOPHAGOGASTRODUODENOSCOPY N/A 12/1/2023    Procedure: EGD (ESOPHAGOGASTRODUODENOSCOPY);  Surgeon: Dimitri Leary MD;  Location: [facility] END[facility];  Service: Endoscopy;  Laterality: N/A;  10/17/23 portal instr TT    ESOPHAGOGASTRODUODENOSCOPY N/A 5/28/2024    Procedure: EGD (ESOPHAGOGASTRODUODENOSCOPY);  Surgeon: Dimitri Leary MD;  Location: [facility] END[facility];  Service: Endoscopy;  Laterality: N/A;  4/11 portal-Repeat upper endoscopy in 6 months for follow-up of Horner's ablation. Leary-tt  5/6 r/s update portal instr-tt  5/21-precall complete-MS    HEMORRHOID SURGERY      LAPAROSCOPIC APPENDECTOMY N/A 4/29/2021    Procedure: APPENDECTOMY, LAPAROSCOPIC;  Surgeon: Osmel Styles MD;  Location: [facility];  Service: General;  Laterality: N/A;    LAPAROSCOPIC LEFT COLON RESECTION      LAPAROSCOPIC LYSIS OF ADHESIONS N/A 4/29/2021    Procedure: LYSIS,  ADHESIONS, LAPAROSCOPIC;  Surgeon: Osmel Styles MD;  Location: [facility] OR;  Service: General;  Laterality: N/A;    SIGMOIDECTOMY  2015    TONSILLECTOMY, ADENOIDECTOMY       Family History   Problem Relation Name Age of Onset    Heart disease Father       Social History     Tobacco Use    Smoking status: Every Day     Current packs/day: 0.50     Types: Cigarettes    Smokeless tobacco: Never   Substance Use Topics    Alcohol use: Yes     Comment: 1-2 per month    Drug use: No     Review of Systems   Constitutional:  Negative for fever.   HENT:  Negative for sore throat.    Respiratory:  Negative for shortness of breath.    Cardiovascular:  Negative for chest pain.   Gastrointestinal:  Negative for nausea.   Genitourinary:  Negative for dysuria.   Musculoskeletal:  Negative for back pain.   Skin:  Negative for rash.   Neurological:  Negative for weakness.   Hematological:  Does not bruise/bleed easily.       Physical Exam     Initial Vitals [09/04/24 1227]   BP Pulse Resp Temp SpO2   124/73 86 20 97.7 °F (36.5 °C) 95 %      MAP       --         Physical Exam    Nursing note and vitals reviewed.  Constitutional: He appears well-developed and well-nourished.   HENT:   Head: Normocephalic and atraumatic.   Eyes: Conjunctivae are normal. Pupils are equal, round, and reactive to light.   Neck: Neck supple.   Normal range of motion.  Cardiovascular:  Normal rate, regular rhythm, normal heart sounds and intact distal pulses.           Pulmonary/Chest: Breath sounds normal. No respiratory distress. He has no wheezes. He has no rhonchi. He has no rales.   Abdominal: Abdomen is soft. There is no rebound and no guarding.   Musculoskeletal:         General: Normal range of motion.      Cervical back: Normal range of motion and neck supple.     Neurological: He is alert.   Skin: Skin is warm and dry.   Psychiatric: He has a normal mood and affect. His behavior is normal. Thought content normal.         ED Course   Procedures  Labs  Reviewed   CBC W/ AUTO DIFFERENTIAL - Abnormal       Result Value    WBC 13.11 (*)     RBC 5.08      Hemoglobin 15.8      Hematocrit 48.3      MCV 95      MCH 31.1 (*)     MCHC 32.7      RDW 14.6 (*)     Platelets 211      MPV 11.0      Immature Granulocytes 0.3      Gran # (ANC) 9.6 (*)     Immature Grans (Abs) 0.04      Lymph # 2.4      Mono # 0.8      Eos # 0.3      Baso # 0.05      nRBC 0      Gran % 73.0      Lymph % 18.2      Mono % 6.1      Eosinophil % 2.0      Basophil % 0.4      Differential Method Automated     COMPREHENSIVE METABOLIC PANEL - Abnormal    Sodium 141      Potassium 4.3      Chloride 106      CO2 25      Glucose 116 (*)     BUN 14      Creatinine 1.1      Calcium 9.5      Total Protein 7.0      Albumin 3.8      Total Bilirubin 0.9      Alkaline Phosphatase 54 (*)     AST 17      ALT 19      eGFR >60      Anion Gap 10     B-TYPE NATRIURETIC PEPTIDE    BNP 59     SARS-COV-2 RNA AMPLIFICATION, QUAL    SARS-CoV-2 RNA, Amplification, Qual Negative            Imaging Results              X-Ray Chest PA And Lateral (Final result)  Result time 09/04/24 14:18:58      Final result by Steven Knutson MD (09/04/24 14:18:58)                   Impression:      No acute abnormality.      Electronically signed by: Steven Knutson  Date:    09/04/2024  Time:    14:18               Narrative:    EXAMINATION:  XR CHEST PA AND LATERAL    CLINICAL HISTORY:  Cough, unspecified    TECHNIQUE:  PA and lateral views of the chest were performed.    COMPARISON:  None    FINDINGS:  The lungs are clear, with normal appearance of pulmonary vasculature and no pleural effusion or pneumothorax.    The cardiac silhouette is normal in size. The hilar and mediastinal contours are unremarkable.    Bones are intact.                                       Medications - No data to display  Medical Decision Making  Amount and/or Complexity of Data Reviewed  Labs: ordered.  Radiology: ordered.    Risk  Prescription drug  management.                                      Clinical Impression:  Final diagnoses:  [R05.9] Coughing  [R07.9] Chest pain          ED Disposition Condition    Discharge Stable          ED Prescriptions       Medication Sig Dispense Start Date End Date Auth. Provider    promethazine-dextromethorphan (PROMETHAZINE-DM) 6.25-15 mg/5 mL Syrp Take 5 mLs by mouth every 6 (six) hours as needed. 120 mL 9/4/2024 9/14/2024 Jose Vail NP          Follow-up Information       Follow up With Specialties Details Why Contact Info    Amador Chaparro MD Family Medicine Schedule an appointment as soon as possible for a visit  As needed [address] [facility]  [address]  [place] [address]               Jose Vail NP  09/05/24 [id number]

## 2024-09-25 NOTE — PROGRESS NOTES
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  Has been smoking for many years and although has the typical sings and symptoms of chronic bronchitis and had been treated at least every year for episodes of acute bronchitis he has never been told he had COPD or any other lung problems.  Currently also has YEAGER that is more marked than before. And the episodes of acute bronchitis are taking longer to recover.    He was recently seen in the hospital for similar complains and is currently completing his second course of PO ABX (Doxycycline)  He comes with his wife who serves as collateral for information       Symptoms Dyspnea (Exertional or at rest), Chronic cough, Sputum production, and Wheezing  Apparent phenotype chronic bronchitis Tobacco use: Current smoker 30 pack year history    Occupational history: Insurance and sales.    Physical examination -   Physical Exam  Vitals and nursing note reviewed.   Constitutional:       Appearance: Normal appearance.   HENT:      Head: Normocephalic and atraumatic.      Nose: Nose normal.      Mouth/Throat:      Mouth: Mucous membranes are moist.   Eyes:      Extraocular Movements: Extraocular movements intact.      Pupils: Pupils are equal, round, and reactive to light.   Cardiovascular:      Rate and Rhythm: Normal rate and regular rhythm.   Pulmonary:      Effort: Pulmonary effort is normal.      Comments: Decreased breath sounds   Abdominal:      General: Abdomen is flat. Bowel sounds are normal.      Palpations: Abdomen is soft.   Musculoskeletal:         General: No swelling.   Skin:     General: Skin is warm.      Capillary Refill: Capillary refill takes less than 2 seconds.   Neurological:      General: No focal deficit present.      Mental Status: He is alert.        Vitals:     "09/26/24 1052   BP: 126/72   Pulse: 70   Resp: 12   SpO2: 95%   Weight: 103.5 kg (228 lb 2.8 oz)   Height: 5' 11.5" (1.816 m)      Review of Systems   Constitutional: Negative.    HENT: Negative.     Eyes: Negative.    Respiratory:  Positive for cough, sputum production, shortness of breath and wheezing.    Cardiovascular: Negative.    Gastrointestinal: Negative.    Musculoskeletal: Negative.    Skin: Negative.    Neurological: Negative.        Relevant data (Independently reviewed by me) -    Prior notes -   PCP and ED notes     Labs -   09/04/2024 BNP wnl  09/04/2024 CMP no hypercapnia  09/04/2024 CBC has absolute eos count of 250    Spirometry -  N/A    Imaging -   09/04/2024 CXR normal except for chronically elevated left hemidiaphragm present for years     Assessment & Plan    Coughing  -     Ambulatory referral/consult to Pulmonology  -     Complete PFT with bronchodilator; Future; Expected date: 09/26/2024  -     CBC Auto Differential; Future; Expected date: 09/26/2024    Mucopurulent chronic bronchitis    Patient has typical history of chronic bronchitis, not entirely sure If emphysema is present I suspect that as he is still currently smoking.  Have to prove fixed airway obstruction to diagnose COPD, then if present will start ICS/LABA/LAMA    He is motivated to stop, we discussed the clear benefits of doing so or at least cutting back    We also discussed the possibility of a LDCT, which he will discuss with his wife and get back to me    RTC in 3 Mo     David Ruiz   09/26/2024    "

## 2024-09-26 ENCOUNTER — OFFICE VISIT (OUTPATIENT)
Dept: PULMONOLOGY | Facility: CLINIC | Age: 79
End: 2024-09-26
Payer: MEDICARE

## 2024-09-26 VITALS
SYSTOLIC BLOOD PRESSURE: 126 MMHG | BODY MASS INDEX: 30.91 KG/M2 | RESPIRATION RATE: 12 BRPM | DIASTOLIC BLOOD PRESSURE: 72 MMHG | OXYGEN SATURATION: 95 % | WEIGHT: 228.19 LBS | HEART RATE: 70 BPM | HEIGHT: 72 IN

## 2024-09-26 DIAGNOSIS — R05.9 COUGHING: Primary | ICD-10-CM

## 2024-09-26 DIAGNOSIS — F17.210 CIGARETTE NICOTINE DEPENDENCE WITHOUT COMPLICATION: ICD-10-CM

## 2024-09-26 DIAGNOSIS — J41.1 MUCOPURULENT CHRONIC BRONCHITIS: ICD-10-CM

## 2024-09-26 PROCEDURE — 99999 PR PBB SHADOW E&M-EST. PATIENT-LVL IV: CPT | Mod: PBBFAC,,, | Performed by: STUDENT IN AN ORGANIZED HEALTH CARE EDUCATION/TRAINING PROGRAM

## 2024-09-26 RX ORDER — LOTILANER OPHTHALMIC SOLUTION 2.5 MG/ML
SOLUTION/ DROPS OPHTHALMIC
COMMUNITY
Start: 2024-09-23

## 2024-09-26 RX ORDER — TRIAMCINOLONE ACETONIDE 1 MG/G
OINTMENT TOPICAL
COMMUNITY
Start: 2024-05-01

## 2024-09-26 RX ORDER — LORATADINE 10 MG/1
1 TABLET ORAL EVERY MORNING
COMMUNITY

## 2024-09-26 RX ORDER — DOXYCYCLINE 100 MG/1
100 CAPSULE ORAL 2 TIMES DAILY
COMMUNITY
Start: 2024-09-06

## 2024-10-10 ENCOUNTER — PATIENT MESSAGE (OUTPATIENT)
Dept: PULMONOLOGY | Facility: CLINIC | Age: 79
End: 2024-10-10
Payer: MEDICARE

## 2024-12-17 ENCOUNTER — PATIENT MESSAGE (OUTPATIENT)
Dept: GASTROENTEROLOGY | Facility: CLINIC | Age: 79
End: 2024-12-17
Payer: MEDICARE

## 2024-12-27 NOTE — DISCHARGE SUMMARY
Discharge Summary/Instructions after an Endoscopic Procedure    Patient Name: Devon Berry  Patient MRN: [id number]  Patient YOB: 1945 Friday, November 08, 2019  Dimitri Leary MD    RESTRICTIONS:  During your procedure today, you received medications for sedation.  These medications may affect your judgment, balance and coordination.  Therefore, for 24 hours, you have the following restrictions:     - DO NOT drive a car, operate machinery, make legal/financial decisions, sign important papers or drink alcohol.      ACTIVITY:  Today: no heavy lifting, straining or running due to procedural sedation/anesthesia.  The following day: return to full activity including work.    DIET:  Eat and drink normally unless instructed otherwise.     TREATMENT FOR COMMON SIDE EFFECTS:  - Mild abdominal pain, nausea, belching, bloating or excessive gas:  rest, eat lightly and use a heating pad.  - Sore Throat: treat with throat lozenges and/or gargle with warm salt water.  - Because air was used during the procedure, expelling large amounts of air from your rectum or belching is normal.  - If a bowel prep was taken, you may not have a bowel movement for 1-3 days.  This is normal.      SYMPTOMS TO WATCH FOR AND REPORT TO YOUR PHYSICIAN:  1. Abdominal pain or bloating, other than gas cramps.  2. Chest pain.  3. Back pain.  4. Signs of infection such as: chills or fever occurring within 24 hours after the procedure.  5. Rectal bleeding, which would show as bright red, maroon, or black stools. (A tablespoon of blood from the rectum is not serious, especially if hemorrhoids are present.)  6. Vomiting.  7. Weakness or dizziness.      GO DIRECTLY TO THE NEAREST EMERGENCY ROOM IF YOU HAVE ANY OF THE FOLLOWING:     Difficulty breathing              Chills and/or fever over 101 F   Persistent vomiting and/or vomiting blood   Severe abdominal pain   Severe chest pain   Black, tarry stools   Bleeding- more than one  tablespoon   Any other symptom or condition that you feel may need urgent attention    Your doctor recommends these additional instructions:  If any biopsies were taken, your doctors clinic will contact you in 1 to 2 weeks with any results.    - Discharge patient to home (ambulatory).   - Resume previous diet.   - Repeat upper endoscopy in 4 months for follow-up of Horner's ablation.   - Use Prilosec (omeprazole) 40 mg PO BID.   - Use sucralfate suspension 1 gram PO BID.    For questions, problems or results please call your physician - Dimitri Leary MD at Work:  [phone].    EMERGENCY PHONE NUMBER: [phone],  LAB RESULTS: [phone]    IF A COMPLICATION OR EMERGENCY SITUATION ARISES AND YOU ARE UNABLE TO REACH YOUR PHYSICIAN - GO DIRECTLY TO THE EMERGENCY ROOM.       Baseline SCr on outpatient labs 5.02  SCr today 5.68  Patient does not need acute dialysis. No change in mentation, electrolytes wnl, and no signs of volume overload    PLAN:  -Trend SCr and lytes  -Monitor for symptoms. If worsening clinical status or change in electrolytes, can consult nephro for urgent HD Baseline SCr on outpatient labs 5.02  SCr today 5.68  Patient does not need acute dialysis. No change in mentation, electrolytes wnl, and no signs of volume overload    PLAN:  -Trend SCr and lytes  -Monitor for symptoms.  -Consult nephro

## 2025-01-06 ENCOUNTER — OFFICE VISIT (OUTPATIENT)
Dept: PULMONOLOGY | Facility: CLINIC | Age: 80
End: 2025-01-06
Payer: MEDICARE

## 2025-01-06 ENCOUNTER — CLINICAL SUPPORT (OUTPATIENT)
Dept: PULMONOLOGY | Facility: CLINIC | Age: 80
End: 2025-01-06
Payer: MEDICARE

## 2025-01-06 VITALS
RESPIRATION RATE: 21 BRPM | SYSTOLIC BLOOD PRESSURE: 122 MMHG | HEIGHT: 72 IN | BODY MASS INDEX: 31.74 KG/M2 | HEART RATE: 64 BPM | DIASTOLIC BLOOD PRESSURE: 82 MMHG | WEIGHT: 234.31 LBS | OXYGEN SATURATION: 97 %

## 2025-01-06 DIAGNOSIS — F17.210 CIGARETTE NICOTINE DEPENDENCE WITHOUT COMPLICATION: ICD-10-CM

## 2025-01-06 DIAGNOSIS — J41.1 MUCOPURULENT CHRONIC BRONCHITIS: Primary | ICD-10-CM

## 2025-01-06 DIAGNOSIS — R05.9 COUGHING: ICD-10-CM

## 2025-01-06 LAB
BRPFT: ABNORMAL
DLCO SINGLE BREATH LLN: 19.75
DLCO SINGLE BREATH PRE REF: 50.2 %
DLCO SINGLE BREATH REF: 26.67
DLCOC SBVA LLN: 2.5
DLCOC SBVA REF: 3.59
DLCOC SINGLE BREATH LLN: 19.75
DLCOC SINGLE BREATH REF: 26.67
DLCOVA LLN: 2.5
DLCOVA PRE REF: 60.6 %
DLCOVA PRE: 2.17 ML/(MIN*MMHG*L) (ref 2.5–4.68)
DLCOVA REF: 3.59
ERV LLN: -16449.02
ERV PRE REF: 180.8 %
ERV REF: 0.98
FEF 25 75 CHG: 3.8 %
FEF 25 75 LLN: 1.12
FEF 25 75 POST REF: 66 %
FEF 25 75 PRE REF: 63.6 %
FEF 25 75 REF: 2.83
FET100 CHG: 20.2 %
FEV1 CHG: 6 %
FEV1 FVC CHG: -1.3 %
FEV1 FVC LLN: 60
FEV1 FVC POST REF: 95 %
FEV1 FVC PRE REF: 96.2 %
FEV1 FVC REF: 75
FEV1 LLN: 2.12
FEV1 POST REF: 96 %
FEV1 PRE REF: 90.6 %
FEV1 REF: 3.05
FRCPLETH LLN: 2.88
FRCPLETH PREREF: 121.2 %
FRCPLETH REF: 3.87
FVC CHG: 7.3 %
FVC LLN: 2.99
FVC POST REF: 100.1 %
FVC PRE REF: 93.3 %
FVC REF: 4.13
IVC PRE: 4.28 L (ref 2.99–5.29)
IVC SINGLE BREATH LLN: 2.99
IVC SINGLE BREATH PRE REF: 103.6 %
IVC SINGLE BREATH REF: 4.13
MVV LLN: 103
MVV PRE REF: 77.4 %
MVV REF: 121
PEF CHG: 9.1 %
PEF LLN: 5.28
PEF POST REF: 102.1 %
PEF PRE REF: 93.5 %
PEF REF: 7.7
POST FEF 25 75: 1.87 L/S (ref 1.12–4.54)
POST FET 100: 7.6 SEC
POST FEV1 FVC: 70.77 % (ref 59.93–87.68)
POST FEV1: 2.92 L (ref 2.12–3.91)
POST FVC: 4.13 L (ref 2.99–5.29)
POST PEF: 7.86 L/S (ref 5.28–10.12)
PRE DLCO: 13.38 ML/(MIN*MMHG) (ref 19.75–33.6)
PRE ERV: 1.78 L (ref -16449.02–16450.98)
PRE FEF 25 75: 1.8 L/S (ref 1.12–4.54)
PRE FET 100: 6.32 SEC
PRE FEV1 FVC: 71.68 % (ref 59.93–87.68)
PRE FEV1: 2.76 L (ref 2.12–3.91)
PRE FRC PL: 4.69 L (ref 2.88–4.86)
PRE FVC: 3.85 L (ref 2.99–5.29)
PRE MVV: 93.78 L/MIN (ref 102.96–139.3)
PRE PEF: 7.2 L/S (ref 5.28–10.12)
PRE RV: 2.91 L (ref 2.21–3.56)
PRE TLC: 6.77 L (ref 6.28–8.58)
RAW LLN: 3.06
RAW PRE REF: 70.3 %
RAW PRE: 2.15 CMH2O*S/L (ref 3.06–3.06)
RAW REF: 3.06
RV LLN: 2.21
RV PRE REF: 100.9 %
RV REF: 2.89
RVTLC LLN: 36
RVTLC PRE REF: 96.2 %
RVTLC PRE: 43.08 % (ref 35.79–53.75)
RVTLC REF: 45
TLC LLN: 6.28
TLC PRE REF: 91 %
TLC REF: 7.43
VA PRE: 6.15 L (ref 7.28–7.28)
VA SINGLE BREATH LLN: 7.28
VA SINGLE BREATH PRE REF: 84.5 %
VA SINGLE BREATH REF: 7.28
VC LLN: 2.99
VC PRE REF: 93.3 %
VC PRE: 3.85 L (ref 2.99–5.29)
VC REF: 4.13

## 2025-01-06 PROCEDURE — 99999 PR PBB SHADOW E&M-EST. PATIENT-LVL IV: CPT | Mod: PBBFAC,,, | Performed by: STUDENT IN AN ORGANIZED HEALTH CARE EDUCATION/TRAINING PROGRAM

## 2025-01-06 PROCEDURE — 1126F AMNT PAIN NOTED NONE PRSNT: CPT | Mod: CPTII,S$GLB,, | Performed by: STUDENT IN AN ORGANIZED HEALTH CARE EDUCATION/TRAINING PROGRAM

## 2025-01-06 PROCEDURE — 3074F SYST BP LT 130 MM HG: CPT | Mod: CPTII,S$GLB,, | Performed by: STUDENT IN AN ORGANIZED HEALTH CARE EDUCATION/TRAINING PROGRAM

## 2025-01-06 PROCEDURE — 1159F MED LIST DOCD IN RCRD: CPT | Mod: CPTII,S$GLB,, | Performed by: STUDENT IN AN ORGANIZED HEALTH CARE EDUCATION/TRAINING PROGRAM

## 2025-01-06 PROCEDURE — 3079F DIAST BP 80-89 MM HG: CPT | Mod: CPTII,S$GLB,, | Performed by: STUDENT IN AN ORGANIZED HEALTH CARE EDUCATION/TRAINING PROGRAM

## 2025-01-06 PROCEDURE — 99213 OFFICE O/P EST LOW 20 MIN: CPT | Mod: 25,S$GLB,, | Performed by: STUDENT IN AN ORGANIZED HEALTH CARE EDUCATION/TRAINING PROGRAM

## 2025-01-06 PROCEDURE — 3288F FALL RISK ASSESSMENT DOCD: CPT | Mod: CPTII,S$GLB,, | Performed by: STUDENT IN AN ORGANIZED HEALTH CARE EDUCATION/TRAINING PROGRAM

## 2025-01-06 PROCEDURE — 1101F PT FALLS ASSESS-DOCD LE1/YR: CPT | Mod: CPTII,S$GLB,, | Performed by: STUDENT IN AN ORGANIZED HEALTH CARE EDUCATION/TRAINING PROGRAM

## 2025-01-06 RX ORDER — IPRATROPIUM BROMIDE AND ALBUTEROL SULFATE 2.5; .5 MG/3ML; MG/3ML
3 SOLUTION RESPIRATORY (INHALATION) EVERY 6 HOURS PRN
Qty: 75 ML | Refills: 0 | Status: SHIPPED | OUTPATIENT
Start: 2025-01-06 | End: 2026-01-06

## 2025-01-06 RX ORDER — ALBUTEROL SULFATE 90 UG/1
2 INHALANT RESPIRATORY (INHALATION) EVERY 6 HOURS PRN
Qty: 1 G | Refills: 11 | Status: SHIPPED | OUTPATIENT
Start: 2025-01-06

## 2025-01-06 RX ORDER — FLUTICASONE FUROATE, UMECLIDINIUM BROMIDE AND VILANTEROL TRIFENATATE 200; 62.5; 25 UG/1; UG/1; UG/1
1 POWDER RESPIRATORY (INHALATION) DAILY
Qty: 1 EACH | Refills: 3 | Status: SHIPPED | OUTPATIENT
Start: 2025-01-06

## 2025-01-06 NOTE — PROGRESS NOTES
"Chief complaint:  Chief Complaint   Patient presents with    Follow-up    COPD        History of present illness -  Follow-up    COPD       Established clinic patient for nicotine dependence, symptoms consistent with chronic bronchitis, dyspnea on exertion cough and recurrent episodes of acute bronchitis.    Interval history   01/06/2025   Comes for follow-up, completed a spirometry, ongoing nicotine use, trying to stop.    Physical examination -   Physical Exam  Vitals and nursing note reviewed.   Constitutional:       Appearance: Normal appearance.   HENT:      Head: Normocephalic and atraumatic.      Nose: Nose normal.      Mouth/Throat:      Mouth: Mucous membranes are moist.   Eyes:      Extraocular Movements: Extraocular movements intact.      Pupils: Pupils are equal, round, and reactive to light.   Cardiovascular:      Rate and Rhythm: Normal rate and regular rhythm.   Pulmonary:      Effort: Pulmonary effort is normal.      Comments: Decreased breath entry in both bases  Abdominal:      General: Abdomen is flat. Bowel sounds are normal.      Palpations: Abdomen is soft.   Musculoskeletal:         General: No swelling.   Skin:     General: Skin is warm.      Capillary Refill: Capillary refill takes less than 2 seconds.   Neurological:      General: No focal deficit present.      Mental Status: He is alert.        Vitals:    01/06/25 1102   BP: 122/82   BP Location: Left arm   Patient Position: Sitting   Pulse: 64   Resp: (!) 21   SpO2: 97%   Weight: 106.3 kg (234 lb 4.8 oz)   Height: 5' 11.5" (1.816 m)      Review of Systems   Constitutional: Negative.    HENT: Negative.     Eyes: Negative.    Respiratory: Negative.     Cardiovascular: Negative.    Gastrointestinal: Negative.    Musculoskeletal: Negative.    Skin: Negative.    Neurological: Negative.        Relevant data (Independently reviewed by me) -    Prior notes -   PCP and ED notes      Labs -   09/04/2024 BNP wnl  09/04/2024 CMP no " hypercapnia  09/04/2024 CBC has absolute eos count of 250     Spirometry -  01/06/2025, spirometry was numerically normal however consistent with mild obstruction     Imaging -   09/04/2024 CXR normal except for chronically elevated left hemidiaphragm present for years     Assessment & Plan    Mucopurulent chronic bronchitis  -     NEBULIZER KIT (SUPPLIES) FOR HOME USE  -     NEBULIZER FOR HOME USE  -     Ambulatory referral/consult to Smoking Cessation Program; Future; Expected date: 01/13/2025  -     CT Chest Without Contrast; Future; Expected date: 01/06/2025    Cigarette nicotine dependence without complication  -     CT Chest Without Contrast; Future; Expected date: 01/06/2025    Other orders  -     albuterol-ipratropium (DUO-NEB) 2.5 mg-0.5 mg/3 mL nebulizer solution; Take 3 mLs by nebulization every 6 (six) hours as needed for Wheezing. Rescue  Dispense: 75 mL; Refill: 0  -     fluticasone-umeclidin-vilanter (TRELEGY ELLIPTA) 200-62.5-25 mcg inhaler; Inhale 1 puff into the lungs once daily.  Dispense: 1 each; Refill: 3  -     albuterol (VENTOLIN HFA) 90 mcg/actuation inhaler; Inhale 2 puffs into the lungs every 6 (six) hours as needed for Wheezing. Rescue  Dispense: 1 g; Refill: 11    Patient's clinical presentation is consistent with obstructive lung disease, we will start the patient on inhaler therapy with Trelegy 1 puff daily, rescue inhaler as needed as well as nebulizer to use at home.    We will pursue CT scan of the chest to ensure lack of other concomitant findings as well as demonstration of pulmonary emphysema.    RTC in 6 months    David Ruiz   01/06/2025

## 2025-01-14 ENCOUNTER — CLINICAL SUPPORT (OUTPATIENT)
Dept: PULMONOLOGY | Facility: CLINIC | Age: 80
End: 2025-01-14
Payer: MEDICARE

## 2025-01-14 ENCOUNTER — HOSPITAL ENCOUNTER (OUTPATIENT)
Dept: RADIOLOGY | Facility: HOSPITAL | Age: 80
Discharge: HOME | End: 2025-01-14
Attending: STUDENT IN AN ORGANIZED HEALTH CARE EDUCATION/TRAINING PROGRAM
Payer: MEDICARE

## 2025-01-14 VITALS — BODY MASS INDEX: 31.22 KG/M2 | HEIGHT: 72 IN | WEIGHT: 230.5 LBS | OXYGEN SATURATION: 95 % | HEART RATE: 82 BPM

## 2025-01-14 DIAGNOSIS — J41.1 MUCOPURULENT CHRONIC BRONCHITIS: ICD-10-CM

## 2025-01-14 DIAGNOSIS — J42 CHRONIC BRONCHITIS: ICD-10-CM

## 2025-01-14 DIAGNOSIS — J42 CHRONIC BRONCHITIS: Primary | ICD-10-CM

## 2025-01-14 DIAGNOSIS — F17.210 CIGARETTE NICOTINE DEPENDENCE WITHOUT COMPLICATION: ICD-10-CM

## 2025-01-14 PROCEDURE — 71250 CT THORAX DX C-: CPT | Mod: 26,,, | Performed by: RADIOLOGY

## 2025-01-14 PROCEDURE — 71250 CT THORAX DX C-: CPT | Mod: TC

## 2025-01-14 PROCEDURE — 99999 PR PBB SHADOW E&M-EST. PATIENT-LVL III: CPT | Mod: PBBFAC,,,

## 2025-01-14 NOTE — PATIENT INSTRUCTIONS
ACTION PLAN    GREEN ZONE  My sputum is clear/white/usual color and easily cleared.  My breathing is no harder than usual.  I can do my usual activities.  I can think clearly.   Take your usual medicines, including oxygen, as you are told to do so by your health care provider.   YELLOW ZONE  My sputum has change (color, thickness, amount).  I am more short of breath than usual.  I cough or wheeze more.  I weigh more and my legs/feet swell.  I cannot do my usual activities without resting.   Call your health care provider. You will probably be told to begin taking an antibiotic and prednisone. Have your pharmacy phone number available.   RED ZONE  I cannot cough out my sputum.  I am much more short of breath than normal.  I need to sit up to breathe  I cannot do my usual activities.  I am unable to speak more than one or two words at a time.  I am confused.   Call your health care provider. You may be asked to come in to be seen, told to go to the emergency room, or told to call 9-1-1.     [id number]  Shortness of Breath: Maximizing Your Energy    Fear of shortness of breath may stop you from being as active as you once were. You don't have to live this way. Managing your time and pacing yourself can help you conserve energy and do more. It's even OK if you're short of breath sometimes. You can learn to work through this without limiting your activities.  Manage your time  Shortness of breath can make everyday tasks take longer. This means there's less time to do the things you enjoy. You can help prevent this by managing your time. Try these tips:  Plan ahead so your tasks are spaced throughout the day. As you plan, keep in mind the times of day you tend to have the most energy.  Do only one thing at a time. Finish one task before starting another.  Gather everything you need before you start a task. This cuts out unneeded steps while you're working.  Think about what you really need to do. Be realistic about what you  can get done in a day.      Balance activity and rest  When you're tired, your activities will take longer. Fatigue also makes you more likely to get an infection. Plan your day so that your tasks are spaced throughout the day. To have more energy:  Stop and rest when you need to. Don't wait until you're overtired.  Switch back and forth between hard tasks and easy ones.  Give yourself plenty of time for each task, so you don't have to hurry.  Take 20- to 30-minute rest breaks after meals and throughout the day.  If an activity takes a lot of energy, break it into smaller parts. For instance, fold the laundry first. Then take a break before putting it away.  Try not to exert yourself in extreme cold or heat.       Find ways to conserve energy  Conserving your energy can help you stay active and breathe better. The way you use your body during a task can help you conserve energy. Think of ways to make things easier, and take your time to ease shortness of breath.  For some tasks, you can also use special aids designed to reduce the amount of energy needed. Here are some tips:  Sit whenever possible, and keep your arms close to your body. Use slow, smooth motions.  Sit to dress and undress, shave, brush your teeth, and comb your hair. Use a long-handled reacher to pull on socks and shoes, and long-handled items like shoe horns.  Sit on a bench to shower. Use warm water, not hot. (Steam can make it harder to breathe.) Dry off by wrapping yourself in a terrycloth robe.  Use energy-saving appliances, such as an electric can opener, a power toothbrush, and a .  Use a cart with wheels to move groceries, laundry, dishes, and other items around the house. Some carts have seats so you can rest when you need to.  Use lightweight, nonstick pots and pans to cook. Soak dirty dishes instead of scrubbing them. Air-dry dishes, or use a .  Mix, cook, serve, and store foods in the same dish.  Keep the things you  use most at waist level, so you can get them without reaching or bending.  Use steps slowly, pausing at each step. If you have steps outside or in your home, think about adding ramps or stair lifts.  Think about ways that others can help you. You might get help from friends, family members, or home health aides.      Remember to breathe  Sometimes people with an illness or condition that affects the lungs try to rush through tasks so they won't get short of breath. This uses more energy and can actually increase shortness of breath. Instead, slow down and pace your breathing. These tips may help:  Move slowly during tasks that take a lot of effort, such as climbing stairs or pushing a shopping cart.  Use pursed-lip and diaphragmatic breathing while you go about a task.  Breathe out (exhale) when you exert effort. For example, breathe out as you lift up a grocery bag. Once you're holding the bag, breathe in. Ask the  at the [employer] to pack your grocery bags so they are light and easy to carry.  Focus on taking slow, deep breaths. If your breathing is shallow, you don't take in as much air.  Remember that it's OK to be short of breath. Just pace yourself and do pursed-lip breathing.      Talk with your healthcare provider about:  If you should use supplemental oxygen  If you need a referral to occupational and physical therapy. Therapists can help you with exercise and daily activities, and how to make things easier.      Pursed-lip breathing  This type of breathing helps you exhale better. Breathing this way during activity will help you reduce shortness of breath:  Relax your neck and shoulder muscles. Breathe in (inhale) slowly through your nose for 2 counts or more.  Pucker your lips as if you are going to blow out a candle. Breathe out slowly and gently through your lips for at least twice as long as you inhaled.

## 2025-01-14 NOTE — PROGRESS NOTES
Pulmonary Disease Management  [facility]  Initial Visit    Referring Provider: Joseph  Diagnosis: chronic bronchitis  Last Hospital Admission: na  Last provider visit: 1/6/25      Current Outpatient Medications:     albuterol (VENTOLIN HFA) 90 mcg/actuation inhaler, Inhale 2 puffs into the lungs every 6 (six) hours as needed for Wheezing. Rescue, Disp: 1 g, Rfl: 11    albuterol-ipratropium (DUO-NEB) 2.5 mg-0.5 mg/3 mL nebulizer solution, Take 3 mLs by nebulization every 6 (six) hours as needed for Wheezing. Rescue, Disp: 75 mL, Rfl: 0    amiodarone (PACERONE) 200 MG Tab, Take 100 mg by mouth once daily., Disp: , Rfl:     aspirin (ECOTRIN) 81 MG EC tablet, Take 81 mg by mouth once daily., Disp: , Rfl:     atorvastatin (LIPITOR) 80 MG tablet, Take 80 mg by mouth every evening., Disp: , Rfl:     doxycycline (VIBRAMYCIN) 100 MG Cap, Take 100 mg by mouth 2 (two) times daily., Disp: , Rfl:     enalapril (VASOTEC) 5 MG tablet, Take 5 mg by mouth once daily., Disp: , Rfl:     ferrous sulfate (FEOSOL) 325 mg (65 mg iron) Tab tablet, Take 325 mg by mouth 2 (two) times daily. , Disp: , Rfl:     fluticasone-umeclidin-vilanter (TRELEGY ELLIPTA) 200-62.5-25 mcg inhaler, Inhale 1 puff into the lungs once daily., Disp: 1 each, Rfl: 3    loratadine (CLARITIN) 10 mg tablet, Take 1 tablet by mouth every morning., Disp: , Rfl:     multivitamin with minerals tablet, Take 1 tablet by mouth every morning., Disp: , Rfl:     omeprazole (PRILOSEC) 20 MG capsule, Take 2 capsules (40 mg total) by mouth 2 (two) times daily before meals. (Patient taking differently: Take 40 mg by mouth once daily.), Disp: 120 capsule, Rfl: 2    triamcinolone acetonide 0.1% (KENALOG) 0.1 % ointment, apply to the lower legs TWICE DAILY FOR 2 WEEKS then as needed for flares THANK YOU, Disp: , Rfl:     XDEMVY 0.25 % Drop, Place into both eyes., Disp: , Rfl:   No current facility-administered medications for this visit.    Facility-Administered Medications  "Ordered in Other Visits:     0.9%  NaCl infusion, , Intravenous, Continuous, Dimitri Leary MD, Last Rate: 20 mL/hr at 08/21/20 0741, 20 mL/hr at 08/21/20 0741    sodium chloride 0.9% flush 10 mL, 10 mL, Intravenous, PRN, Dimitri Leary MD    Review of patient's allergies indicates:  No Known Allergies    Smoking history:   Social History     Tobacco Use   Smoking Status Every Day    Current packs/day: 0.50    Types: Cigarettes   Smokeless Tobacco Never       Pulse: 82  SpO2: 95 %        Height: 5' 11.5" (181.6 cm)  Weight: 104.5 kg (230 lb 7.9 oz)  BMI (kg/m2): 31.77                        Resting Gallo Dyspnea Rating : 0      Current Oxygen Use: No                 Does the patient use BiPAP, CPAP or Trilogy?: No                                                  PFT Results:  Pre FVC   Date/Time Value Ref Range Status   01/06/2025 01:24 PM 3.85 2.99 - 5.29 L Final     Pre FEV1   Date/Time Value Ref Range Status   01/06/2025 01:24 PM 2.76 2.12 - 3.91 L Final     Pre FEV1 FVC   Date/Time Value Ref Range Status   01/06/2025 01:24 PM 71.68 59.93 - 87.68 % Final     Pre TLC   Date/Time Value Ref Range Status   01/06/2025 01:24 PM 6.77 6.28 - 8.58 L Final     Pre DLCO   Date/Time Value Ref Range Status   01/06/2025 01:24 PM 13.38 (L) 19.75 - 33.60 ml/(min*mmHg) Final             Method Used for Education: Literature, Demonstration, Verbal          Patient Concerns or Expectations:   How to use nebulizer.  How to take inhalers.    Therapist Comments:   Devon Berry  was seen 1/14/2025  4:53 PM in the Pulmonary Disease management clinic for evaluation, education, reinforcement of self management techniques and exacerbation action plan.    Rani Ferrer    Past Medical History:   Diagnosis Date    Horner's esophagus     CAD (coronary artery disease)     Colon cancer     colorectal cancer    Colon polyps     Diverticulosis of colon     GERD (gastroesophageal reflux disease)     Heartburn     Hemorrhoids  "    HTN (hypertension)     Hyperlipidemia     Pneumonia                  Educational assessment:   [x]            Good  []            Fair  []            Poor    Readiness to learn:   [x]            Good  []            Fair  []            Poor    Vision Status:   [x]            Good  []            Fair  []            Poor    Reading Ability:  [x]            Good  []            Fair  []            Poor    Knowledge of condition:   []            Good  [x]            Fair  []            Poor    Language Barriers:   []            Good  []            Fair  []            Poor  [x]            None    Cognitive/ Physical Barriers:   []            Good  []            Fair  []            Poor  [x]            None    Learning best by:                       [x]            Seeing  [x]            Hearing  [x]            Reading                         [x]            Doing    Describe any barrier /Limitation or financial implications of care choices identified     []            Financial  []            Emotional  []            Education  []            Vision/Hearing  []            Physical  [x]            None  []                TOPIC /CONTENT FOR TODAY:    [x]            MDI with or without spacer  [x]            Dry powder inhaler  []            Acapella   []           Peak Flow meter  [x]            COPD action plan  [x]            Nebulizer use  [x]            Oxygen use safety  [x]            Breathing and cough techniques  [x]            Energy conservation  [x]            Infection prevention  []           OTHER________________________        Learner:    [x]            Patient   [x]            Caregiver    Method:    [x]            Verbal explanation  []            Audio visual    [x]            Literature  [x]            Teach back      Evaluation:    [x]            Teach back  [x]            Demonstrate  [x]            Follow up phone call    []            2 weeks     [x]            4 weeks   []             PRN    Additional comments:   Patient was seen today to review respiratory medication purpose and proper technique for use of inhalers. Patient practiced proper technique using MDI and DPI inhalers. Patient demonstrated understanding. Literature was given to patient.     Infection prevention was discussed. Patient was reminded to get influenza vaccine. Hand hygiene and cleaning of respiratory equipment was also discussed. Patient verbalized understanding.      COPD action plan was reviewed and literature was given to patient. Patient verbalized understanding.     Reviewed breathing techniques such as pursed-lip breathing, munguia-cough technique, and diaphragmatic breathing. Patient practiced proper technique and verbalized understanding. Literature given to patient.     The patient is sincerely urged to quit smoking. The numerous direct health benefits are discussed. If she decides to quit, there are a number of helpful adjunctive aids available. There are  alternatives available to help with this difficult task, but first and foremost, she must make a firm commitment and decision to quit. As we age, even healthy lungs lose some function. Smokers who get COPD lose lung function at a much faster rate. People who have frequent flare-ups lose lung function faster, too. Quitting smoking is the most important thing you can do to actually slow down the loss of lung function. While you cannot return the lung function to normal after quitting smoking, you will slow the progression of your COPD.    Plan:  Monthly Pulmonary Disease Management Questionnaire  Follow-up PDM appointment scheduled for 7/15/25   Reinforce education  Meds: Albuterol, duoneb, Trelegy  DME Needs: na  Action Plan: copd    Next Provider Visit: 7/9/25  Next Spirometry/CPFT: not scheduled  Approximate time spent with patient: 45 minutes

## 2025-01-15 ENCOUNTER — PATIENT MESSAGE (OUTPATIENT)
Dept: PULMONOLOGY | Facility: CLINIC | Age: 80
End: 2025-01-15
Payer: MEDICARE

## 2025-01-15 DIAGNOSIS — J41.1 MUCOPURULENT CHRONIC BRONCHITIS: Primary | ICD-10-CM

## 2025-01-15 NOTE — PROGRESS NOTES
The results of the CT chest are very reassuring, some smoking related damage, but no suspicious nodules to suggest early malignancy

## 2025-01-31 DIAGNOSIS — J40 BRONCHITIS: Primary | ICD-10-CM

## 2025-01-31 DIAGNOSIS — R05.9 COUGH: Primary | ICD-10-CM

## 2025-04-03 ENCOUNTER — TELEPHONE (OUTPATIENT)
Dept: ENDOSCOPY | Facility: HOSPITAL | Age: 80
End: 2025-04-03
Payer: MEDICARE

## 2025-04-03 DIAGNOSIS — K22.710 BARRETT'S ESOPHAGUS WITH LOW GRADE DYSPLASIA: Primary | ICD-10-CM

## 2025-04-03 NOTE — TELEPHONE ENCOUNTER
Dimitri Leary MD  6/4/2024  8:20 AM CDT Back to Top      Need EGD in 1 year for surveillance of Horner's with LGD S/P RFA eradication.Thanks,RR

## 2025-04-03 NOTE — TELEPHONE ENCOUNTER
Telephoned pt to schedule EGD with no answer.  Left voicemail messages with direct contact number for pt to return call. Portal message also.

## 2025-04-03 NOTE — TELEPHONE ENCOUNTER
Spoke to pt's wife, Damari, to schedule procedure(s) Upper Endoscopy (EGD)       Physician to perform procedure(s) Dr. LALA Arguello  Date of Procedure (s) 5/30/25  Arrival Time 11:30 AM  Time of Procedure(s) 12:30 PM   Location of Procedure(s) [facility] [address] [facility]  Type of Rx Prep sent to patient: N/A  Instructions provided to [facility] via [facility]    Patient was informed on the following information and verbalized understanding. Screening questionnaire reviewed with patient and complete. If procedure requires anesthesia, a responsible adult needs to be present to accompany the patient home, patient cannot drive after receiving anesthesia. Appointment details are tentative, especially check-in time. Patient will receive a prep-op call 7 days prior to confirm check-in time for procedure. If applicable the patient should contact their pharmacy to verify Rx for procedure prep is ready for pick-up. Patient was advised to call the scheduling department at [phone] if pharmacy states no Rx is available. Patient was advised to call the endoscopy scheduling department if any questions or concerns arise.       Endoscopy Scheduling Department

## 2025-04-15 ENCOUNTER — PATIENT MESSAGE (OUTPATIENT)
Dept: NEUROLOGY | Facility: CLINIC | Age: 80
End: 2025-04-15
Payer: MEDICARE

## 2025-05-28 ENCOUNTER — TELEPHONE (OUTPATIENT)
Dept: ENDOSCOPY | Facility: HOSPITAL | Age: 80
End: 2025-05-28
Payer: MEDICARE

## 2025-05-29 ENCOUNTER — TELEPHONE (OUTPATIENT)
Dept: ENDOSCOPY | Facility: HOSPITAL | Age: 80
End: 2025-05-29
Payer: MEDICARE

## 2025-05-29 NOTE — TELEPHONE ENCOUNTER
Left message instructing patient to call dept @ [phone] between 8am-4pm.    Arrival time to be given @ *[phone]  (Message sent via [facility] portal)

## 2025-05-30 ENCOUNTER — HOSPITAL ENCOUNTER (OUTPATIENT)
Facility: HOSPITAL | Age: 80
Discharge: HOME | End: 2025-05-30
Attending: INTERNAL MEDICINE | Admitting: INTERNAL MEDICINE
Payer: MEDICARE

## 2025-05-30 ENCOUNTER — ANESTHESIA (OUTPATIENT)
Dept: ENDOSCOPY | Facility: HOSPITAL | Age: 80
End: 2025-05-30
Payer: MEDICARE

## 2025-05-30 ENCOUNTER — ANESTHESIA EVENT (OUTPATIENT)
Dept: ENDOSCOPY | Facility: HOSPITAL | Age: 80
End: 2025-05-30
Payer: MEDICARE

## 2025-05-30 VITALS
WEIGHT: 225 LBS | RESPIRATION RATE: 10 BRPM | OXYGEN SATURATION: 97 % | DIASTOLIC BLOOD PRESSURE: 63 MMHG | HEART RATE: 57 BPM | TEMPERATURE: 98 F | SYSTOLIC BLOOD PRESSURE: 113 MMHG | HEIGHT: 72 IN | BODY MASS INDEX: 30.48 KG/M2

## 2025-05-30 DIAGNOSIS — K22.710 BARRETT'S ESOPHAGUS WITH LOW GRADE DYSPLASIA: ICD-10-CM

## 2025-05-30 PROCEDURE — 88305 TISSUE EXAM BY PATHOLOGIST: CPT | Mod: TC | Performed by: INTERNAL MEDICINE

## 2025-05-30 PROCEDURE — 37000009 HC ANESTHESIA EA ADD 15 MINS: Performed by: INTERNAL MEDICINE

## 2025-05-30 PROCEDURE — 43239 EGD BIOPSY SINGLE/MULTIPLE: CPT | Performed by: INTERNAL MEDICINE

## 2025-05-30 PROCEDURE — 25000003 PHARM REV CODE 250

## 2025-05-30 PROCEDURE — 63600175 PHARM REV CODE 636 W HCPCS

## 2025-05-30 PROCEDURE — 43239 EGD BIOPSY SINGLE/MULTIPLE: CPT | Mod: ,,, | Performed by: INTERNAL MEDICINE

## 2025-05-30 PROCEDURE — 37000008 HC ANESTHESIA 1ST 15 MINUTES: Performed by: INTERNAL MEDICINE

## 2025-05-30 PROCEDURE — 27201012 HC FORCEPS, HOT/COLD, DISP: Performed by: INTERNAL MEDICINE

## 2025-05-30 RX ORDER — DEXMEDETOMIDINE HYDROCHLORIDE 100 UG/ML
INJECTION, SOLUTION INTRAVENOUS
Status: DISCONTINUED | OUTPATIENT
Start: 2025-05-30 | End: 2025-05-30

## 2025-05-30 RX ORDER — SODIUM CHLORIDE 9 MG/ML
INJECTION, SOLUTION INTRAVENOUS CONTINUOUS
Status: DISCONTINUED | OUTPATIENT
Start: 2025-05-30 | End: 2025-05-30 | Stop reason: HOSPADM

## 2025-05-30 RX ORDER — LIDOCAINE HYDROCHLORIDE 20 MG/ML
INJECTION INTRAVENOUS
Status: DISCONTINUED | OUTPATIENT
Start: 2025-05-30 | End: 2025-05-30

## 2025-05-30 RX ORDER — PROPOFOL 10 MG/ML
VIAL (ML) INTRAVENOUS CONTINUOUS PRN
Status: DISCONTINUED | OUTPATIENT
Start: 2025-05-30 | End: 2025-05-30

## 2025-05-30 RX ORDER — SODIUM CHLORIDE 0.9 % (FLUSH) 0.9 %
10 SYRINGE (ML) INJECTION
Status: DISCONTINUED | OUTPATIENT
Start: 2025-05-30 | End: 2025-05-30 | Stop reason: HOSPADM

## 2025-05-30 RX ORDER — PROPOFOL 10 MG/ML
VIAL (ML) INTRAVENOUS
Status: DISCONTINUED | OUTPATIENT
Start: 2025-05-30 | End: 2025-05-30

## 2025-05-30 RX ORDER — TRAZODONE HYDROCHLORIDE 50 MG/1
50 TABLET ORAL NIGHTLY
COMMUNITY

## 2025-05-30 RX ADMIN — GLYCOPYRROLATE 0.2 MG: 0.2 INJECTION, SOLUTION INTRAMUSCULAR; INTRAVITREAL at 12:05

## 2025-05-30 RX ADMIN — DEXMEDETOMIDINE HYDROCHLORIDE 8 MCG: 100 INJECTION, SOLUTION, CONCENTRATE INTRAVENOUS at 12:05

## 2025-05-30 RX ADMIN — PROPOFOL 125 MCG/KG/MIN: 10 INJECTION, EMULSION INTRAVENOUS at 12:05

## 2025-05-30 RX ADMIN — PROPOFOL 70 MG: 10 INJECTION, EMULSION INTRAVENOUS at 12:05

## 2025-05-30 RX ADMIN — LIDOCAINE HYDROCHLORIDE 60 MG: 20 INJECTION, SOLUTION INTRAVENOUS at 12:05

## 2025-05-30 RX ADMIN — SODIUM CHLORIDE: 0.9 INJECTION, SOLUTION INTRAVENOUS at 12:05

## 2025-05-30 RX ADMIN — LIDOCAINE HYDROCHLORIDE 100 MG: 20 INJECTION, SOLUTION INTRAVENOUS at 12:05

## 2025-05-30 NOTE — H&P
Short Stay Endoscopy History and Physical    PCP - Amador Chaparro MD  Referring Physician - Ebony Arguello MD  [address] [place] [address]    Procedure - EGD  ASA - per anesthesia  Mallampati - per anesthesia  History of Anesthesia problems - per anesthesia  Family history Anesthesia problems -  per anesthesia   Plan of anesthesia - per anesthesia    HPI:  This is a 79 y.o. male here for evaluation of: EGD for surveillance of eradication of prior Horner's with LGD s/p RFA ablations/eradication seen on sampling from one year ago.    Reflux - no  Dysphagia - no  Abdominal pain - no  Diarrhea - no    ROS:  Constitutional: No fevers, chills, No weight loss  CV: No chest pain  Pulm: No cough, No shortness of breath  Ophtho: No vision changes  GI: see HPI  Derm: No rash    Medical History:  has a past medical history of Horner's esophagus, CAD (coronary artery disease), Colon cancer, Colon polyps, Diverticulosis of colon, GERD (gastroesophageal reflux disease), Heartburn, Hemorrhoids, HTN (hypertension), Hyperlipidemia, and Pneumonia.    Surgical History:  has a past surgical history that includes Colonoscopy; Esophagogastroduodenoscopy; Sigmoidectomy (2015); Hemorrhoid surgery; Coronary artery bypass graft (03/04/2016); TONSILLECTOMY, ADENOIDECTOMY; Laparoscopic left colon resection; Cardiac surgery; Coronary artery bypass graft; Esophagogastroduodenoscopy (N/A, 01/17/2019); Esophagogastroduodenoscopy (N/A, 04/16/2019); Esophagogastroduodenoscopy (N/A, 07/22/2019); Esophagogastroduodenoscopy (N/A, 11/08/2019); Esophagogastroduodenoscopy (N/A, 03/09/2020); Esophagogastroduodenoscopy (N/A, 08/21/2020); Esophagogastroduodenoscopy (N/A, 04/09/2021); Laparoscopic appendectomy (N/A, 04/29/2021); Laparoscopic lysis of adhesions (N/A, 04/29/2021); Colonoscopy (N/A, 07/01/2021); Esophagogastroduodenoscopy (N/A, 04/04/2022); Esophagogastroduodenoscopy (N/A, 05/26/2023); Esophagogastroduodenoscopy  (N/A, 12/01/2023); and Esophagogastroduodenoscopy (N/A, 05/28/2024).    Family History: family history includes Heart disease in his father..    Social History:  reports that he has been smoking cigarettes. He has never used smokeless tobacco. He reports current alcohol use. He reports that he does not use drugs.    Review of patient's allergies indicates:  No Known Allergies    Medications:   Prescriptions Prior to Admission[1]    Physical Exam:    Vital Signs:   Vitals:    05/30/25 1205   BP: 121/75   Pulse:    Resp:    Temp:        General Appearance: Well appearing in no acute distress    Labs:  Lab Results   Component Value Date    WBC 13.11 (H) 09/04/2024    HGB 15.8 09/04/2024    HCT 48.3 09/04/2024     09/04/2024    ALT 19 09/04/2024    AST 17 09/04/2024     09/04/2024    K 4.3 09/04/2024     09/04/2024    CREATININE 1.1 09/04/2024    BUN 14 09/04/2024    CO2 25 09/04/2024    TSH 0.91 02/29/2024    INR 1.0 04/29/2021       I have explained the risks and benefits of this endoscopic procedure to the patient including but not limited to bleeding, inflammation, infection, perforation, missing a lesion and death.      Ebony Arguello MD         [1]   Medications Prior to Admission   Medication Sig Dispense Refill Last Dose/Taking    amiodarone (PACERONE) 200 MG Tab Take 100 mg by mouth once daily.   5/30/2025    aspirin (ECOTRIN) 81 MG EC tablet Take 81 mg by mouth once daily.   5/29/2025    atorvastatin (LIPITOR) 80 MG tablet Take 80 mg by mouth every evening.   5/29/2025    enalapril (VASOTEC) 5 MG tablet Take 5 mg by mouth once daily.   5/30/2025    ferrous sulfate (FEOSOL) 325 mg (65 mg iron) Tab tablet Take 325 mg by mouth 2 (two) times daily.    5/29/2025    fluticasone-umeclidin-vilanter (TRELEGY ELLIPTA) 200-62.5-25 mcg inhaler Inhale 1 puff into the lungs once daily. 1 each 3 5/29/2025    loratadine (CLARITIN) 10 mg tablet Take 1 tablet by mouth every morning.   5/29/2025     multivitamin with minerals tablet Take 1 tablet by mouth every morning.   5/29/2025    omeprazole (PRILOSEC) 20 MG capsule Take 2 capsules (40 mg total) by mouth 2 (two) times daily before meals. (Patient taking differently: Take 40 mg by mouth once daily.) 120 capsule 2 5/29/2025    traZODone (DESYREL) 50 MG tablet Take 50 mg by mouth every evening.   5/29/2025    albuterol (VENTOLIN HFA) 90 mcg/actuation inhaler Inhale 2 puffs into the lungs every 6 (six) hours as needed for Wheezing. Rescue 1 g 11 Unknown    albuterol-ipratropium (DUO-NEB) 2.5 mg-0.5 mg/3 mL nebulizer solution Take 3 mLs by nebulization every 6 (six) hours as needed for Wheezing. Rescue 75 mL 0 Unknown    doxycycline (VIBRAMYCIN) 100 MG Cap Take 100 mg by mouth 2 (two) times daily.       triamcinolone acetonide 0.1% (KENALOG) 0.1 % ointment apply to the lower legs TWICE DAILY FOR 2 WEEKS then as needed for flares THANK YOU   Unknown    XDEMVY 0.25 % Drop Place into both eyes.   Unknown

## 2025-05-30 NOTE — PLAN OF CARE
Admission process complete. Patient ready for procedure. Plan of care reviewed with patient. Preoperative fasting appropriate for procedure and sedation. Call light in reach and bed in lowest position.   Past Medical History:   Diagnosis Date    Horner's esophagus     CAD (coronary artery disease)     Colon cancer     colorectal cancer    Colon polyps     Diverticulosis of colon     GERD (gastroesophageal reflux disease)     Heartburn     Hemorrhoids     HTN (hypertension)     Hyperlipidemia     Pneumonia

## 2025-06-03 LAB
ESTROGEN SERPL-MCNC: NORMAL PG/ML
INSULIN SERPL-ACNC: NORMAL U[IU]/ML
LAB AP CLINICAL INFORMATION: NORMAL
LAB AP GROSS DESCRIPTION: NORMAL
LAB AP PERFORMING LOCATION(S): NORMAL
LAB AP REPORT FOOTNOTES: NORMAL

## 2025-07-08 ENCOUNTER — PATIENT MESSAGE (OUTPATIENT)
Dept: PULMONOLOGY | Facility: CLINIC | Age: 80
End: 2025-07-08
Payer: MEDICARE

## 2025-07-15 ENCOUNTER — CLINICAL SUPPORT (OUTPATIENT)
Dept: PULMONOLOGY | Facility: CLINIC | Age: 80
End: 2025-07-15
Payer: MEDICARE

## 2025-07-15 DIAGNOSIS — J40 BRONCHITIS: Primary | ICD-10-CM

## 2025-07-15 NOTE — PATIENT INSTRUCTIONS
ACTION PLAN    GREEN ZONE  My sputum is clear/white/usual color and easily cleared.  My breathing is no harder than usual.  I can do my usual activities.  I can think clearly.   Take your usual medicines, including oxygen, as you are told to do so by your health care provider.   YELLOW ZONE  My sputum has change (color, thickness, amount).  I am more short of breath than usual.  I cough or wheeze more.  I weigh more and my legs/feet swell.  I cannot do my usual activities without resting.   Call your health care provider. You will probably be told to begin taking an antibiotic and prednisone. Have your pharmacy phone number available.   RED ZONE  I cannot cough out my sputum.  I am much more short of breath than normal.  I need to sit up to breathe  I cannot do my usual activities.  I am unable to speak more than one or two words at a time.  I am confused.   Call your health care provider. You may be asked to come in to be seen, told to go to the emergency room, or told to call 9-1-1.     Asthma Trigger Checklist  Allergens, irritants, and other things may trigger your asthma. Check the box next to each of your triggers. After each trigger is a list of ways to avoid it.   Dust mites. Dust mites live in mattresses, bedding, carpets, curtains, and indoor dust.  To kill dust mites, wash bedding in hot water (130°F) each week.  Cover mattress and pillows with special dust-mite-proof cases.  Don't use upholstered furniture like sofas or chairs in the bedroom.  Use allergy-proof filters for air conditioners and furnaces. Replace or clean them as instructed.  If you can, replace carpeting with wood or tile godfrey, especially in the bedroom.  Animals. Animals with fur or feathers shed dander (allergens).  It's best to choose a pet that doesn't have fur or feathers, such as a fish or a reptile.  If you have pets, keep them off your bed and out of your bedroom.  Wash your hands and clothes after handling pets.    Mold. Mold  grows in damp places, such as bathrooms, basements, and closets.  Ask someone to clean damp areas in your home every week. Or try wearing a face mask while you clean.  Run an exhaust fan while bathing. Or leave a window open in the bathroom.  Repair water leaks in or around your home.  Have someone else cut grass or rake leaves, if possible.  Don't use vaporizers or humidifiers. They encourage mold growth.    Pollen. Pollen from trees, grasses, and weeds is a common allergen. (Flower pollens are generally not a problem).  Try to learn what types of pollen affect you most. Pollen levels vary depending on the plant, the season, and the time of day.  If possible, use air conditioning instead of opening the windows in your home or car.  Have someone else do yard work, if possible.    Cockroaches. Roaches are found in many homes and produce allergens.  Keep your kitchen clean and dry. A leaky faucet or drain can attract roaches.  Remove garbage from your home daily.  Store food in tightly sealed containers. Wash dishes as soon as they are used.  Use bait stations or traps to control roaches. Avoid using chemical sprays.    Smoke. Smoke may be from cigarettes, cigars, pipes, incense or candles, barbecues or grills, and fireplaces.  Don't smoke. And don't let people smoke in your home or car.  When you travel, ask for nonsmoking rental cars and hotel rooms.  Avoid fireplaces and wood stoves. If you can't, sit away from them. Make sure the smoke is directed outside.  Don't burn incense or use candles.  Move away from smoky outdoor cooking grills.    Smog.  Smog is from car exhaust and other pollution.  Read or listen to local air-quality reports. These let you know when air quality is poor.  Stay indoors as much as you can on smoggy days. If possible, use air conditioning instead of opening the windows.  In your car, set air conditioning to recirculate air, so less pollution gets in.    Strong odors. These include air  fresheners, deodorizers, and cleaning products; perfume, deodorant, and other beauty products; incense and candles; and insect sprays and other sprays.  Use scent-free products like deodorant or body lotion.  Avoid using cleaning products with bleach and ammonia. Make your own cleaning solution with white vinegar, baking soda, or mild dish soap.  Use exhaust fans while cooking. Or open a window, if possible.   Avoid perfumes, air fresheners, potpourri, and other scented products.          Other irritants. These include dust, aerosol sprays, and powders.  Wear a face mask while doing tasks like sanding, dusting, sweeping, and yard work. Open doors and windows if working indoors.  Use pump spray bottles instead of aerosols.  Pour liquid  onto a rag or cloth instead of spraying them.    Weather. Weather conditions can trigger symptoms or make them worse.  Watch for very high or low temperatures, very humid conditions, or a lot of wind, as these conditions can make symptoms worse.  Limit outdoor activity during the type of weather that affects you.  Wear a scarf over your mouth and nose in cold weather.    Colds, flu, and sinus infections. Upper respiratory infections can trigger asthma.  Wash your hands often with soap and warm water or use a hand  containing alcohol.  Get a yearly flu shot. And ask if you should get a pneumonia vaccine.  Take care of your general health. Get plenty of sleep. And eat a variety of healthy foods.    Food additives. Food additives can trigger asthma flare-ups in some people.  Check food labels for sulfites or other similar ingredients. These are often found in foods such as wine, beer, and dried fruits.  Avoid foods that contain these additives.    Medicine. Aspirin, NSAIDS like ibuprofen and naproxen, and heart medicines like beta-blockers may be triggers.  Tell your health care provider if you think certain medicines trigger symptoms.   Be sure to read the labels on  over-the-counter medicines. They may have ingredients that cause symptoms for you.   , Emotions. Laughing, crying, or feeling excited are triggers for some people.   To help you stay calm: Try breathing in slowly through your nose for a count of 2 seconds. Close your lips and breathe out for 4 seconds. Repeat.  Try to focus on a soothing image in your mind. This will help relax you and calm your breathing.  Remember to take your daily controller medicines. When you're upset or under stress, it's easy to forget.    Exercise. For some people, exercise can trigger symptoms. Don't let this keep you from being active.   If you have not been exercising regularly, start slow and work up gradually.  Take all of your medicines as prescribed.  If you use quick-relief medicine, make sure you have it with you when you exercise.  Stop if you have any symptoms. Make sure you talk with your provider about these symptoms.  © 0675-5743 [employer]. [address] [place] [address], [place] [address]. All rights reserved. This information is not intended as a substitute for professional medical care. Always follow your healthcare professional's instructions.

## 2025-07-15 NOTE — PROGRESS NOTES
Pulmonary Disease Management  [facility]  Follow up Visit  Chronic Care Management    Devon Berry  was seen 7/15/2025  1:30 PM in the Pulmonary Disease management clinic for evaluation, education, reinforcement of self management techniques and exacerbation action plan.    The patient location is:  [address] [place] [id number]  Visit type: Virtual visit with  audio     Rani Ferrer    Past Medical History:   Diagnosis Date    Horner's esophagus     CAD (coronary artery disease)     Colon cancer     colorectal cancer    Colon polyps     Diverticulosis of colon     GERD (gastroesophageal reflux disease)     Heartburn     Hemorrhoids     HTN (hypertension)     Hyperlipidemia     Pneumonia        Patient's Medications   New Prescriptions    No medications on file   Previous Medications    ALBUTEROL (VENTOLIN HFA) 90 MCG/ACTUATION INHALER    Inhale 2 puffs into the lungs every 6 (six) hours as needed for Wheezing. Rescue    ALBUTEROL-IPRATROPIUM (DUO-NEB) 2.5 MG-0.5 MG/3 ML NEBULIZER SOLUTION    Take 3 mLs by nebulization every 6 (six) hours as needed for Wheezing. Rescue    AMIODARONE (PACERONE) 200 MG TAB    Take 100 mg by mouth once daily.    ASPIRIN (ECOTRIN) 81 MG EC TABLET    Take 81 mg by mouth once daily.    ATORVASTATIN (LIPITOR) 80 MG TABLET    Take 80 mg by mouth every evening.    DOXYCYCLINE (VIBRAMYCIN) 100 MG CAP    Take 100 mg by mouth 2 (two) times daily.    ENALAPRIL (VASOTEC) 5 MG TABLET    Take 5 mg by mouth once daily.    FERROUS SULFATE (FEOSOL) 325 MG (65 MG IRON) TAB TABLET    Take 325 mg by mouth 2 (two) times daily.     FLUTICASONE-UMECLIDIN-VILANTER (TRELEGY ELLIPTA) 200-62.5-25 MCG INHALER    Inhale 1 puff into the lungs once daily.    LORATADINE (CLARITIN) 10 MG TABLET    Take 1 tablet by mouth every morning.    MULTIVITAMIN WITH MINERALS TABLET    Take 1 tablet by mouth every morning.    OMEPRAZOLE (PRILOSEC) 20 MG CAPSULE    Take 2 capsules (40 mg total)  "by mouth 2 (two) times daily before meals.    TRAZODONE (DESYREL) 50 MG TABLET    Take 50 mg by mouth every evening.    TRIAMCINOLONE ACETONIDE 0.1% (KENALOG) 0.1 % OINTMENT    apply to the lower legs TWICE DAILY FOR 2 WEEKS then as needed for flares THANK YOU    XDEMVY 0.25 % DROP    Place into both eyes.   Modified Medications    No medications on file   Discontinued Medications    No medications on file       Office Spirometry Results:         5/30/2025     2:31 PM 5/30/2025     1:45 PM 5/30/2025     1:30 PM 5/30/2025     1:15 PM 5/30/2025    12:03 PM 1/14/2025     2:30 PM 1/6/2025    11:02 AM   Pulmonary Function Tests   SpO2 97 % 95 % 92 % 92 % 95 % 95 % 97 %   Height     6' (1.829 m) 5' 11.5" (1.816 m) 5' 11.5" (1.816 m)   Weight     102.1 kg (225 lb) 104.5 kg (230 lb 7.9 oz) 106.3 kg (234 lb 4.8 oz)   BMI (Calculated)     30.5 31.7 32.2         5/30/2025     2:31 PM   Pulmonary Studies Review   SpO2 97 %                 Educational assessment:   [x]            Good  []            Fair  []            Poor    Readiness to learn:   [x]            Good  []            Fair  []            Poor    Vision Status:   [x]            Good  []            Fair  []            Poor    Reading Ability:  [x]            Good  []            Fair  []            Poor    Knowledge of condition:   [x]            Good  []            Fair  []            Poor    Language Barriers:   []            Good  []            Fair  []            Poor  [x]            None    Cognitive/ Physical Barriers:   []            Good  []            Fair  []            Poor  [x]            None    Learning best by:                       [x]            Seeing  [x]            Hearing  [x]            Reading                         [x]            Doing    Describe any barrier /Limitation or financial implications of care choices identified     []            Financial  []            Emotional  []            Education  []            Vision/Hearing  []           "  Physical  [x]            None  []                TOPIC /CONTENT FOR TODAY:    [x]            MDI with or without spacer  [x]            Dry powder inhaler  []            Acapella   []           Peak Flow meter  [x]            COPD action plan  []            Nebulizer use  []            Oxygen use safety  [x]            Breathing and cough techniques  []            Energy conservation  []            Infection prevention  [x]           Asthma trigger checklist        Learner:    [x]            Patient   []            Caregiver    Method:    [x]            Verbal explanation  []            Audio visual    [x]            Literature  [x]            Teach back      Evaluation:    [x]            Teach back  [x]            Demonstrate  [x]            Follow up phone call    []            2 weeks     [x]            4 weeks   []            PRN      Patient concerns and expectations:  na    Therapist comments:   Patient was seen today to review respiratory medication purpose and proper technique for use of inhalers. Patient practiced proper technique using MDI with valved holding chamber (spacer) and DPI inhalers. Patient demonstrated understanding. Literature was given to patient.     Asthma trigger checklist was verbally reviewed and literature given to patient.     Infection prevention was discussed. Patient was reminded to get influenza vaccine. Hand hygiene and cleaning of respiratory equipment was also discussed. Patient verbalized understanding.      COPD action plan was reviewed and literature was given to patient. Patient verbalized understanding.     He currently smokes cigarettes. His quit date is 1/1/26.      Plan:  Monthly Pulmonary Disease Management Questionnaire  Follow-up PDM appointment scheduled for 1/6/26    Reinforce education  Meds: Trelegy, Albuterol  DME Needs: na  Action Plan: copd  Immunization: Pneumococcal- current, Flu-current  Next Provider Visit: 7/16/25  Next Spirometry/CPFT: not  scheduled  Approximate time spent with patient: 20 minutes

## 2025-07-16 ENCOUNTER — OFFICE VISIT (OUTPATIENT)
Dept: PULMONOLOGY | Facility: CLINIC | Age: 80
End: 2025-07-16
Payer: MEDICARE

## 2025-07-16 VITALS
HEART RATE: 68 BPM | SYSTOLIC BLOOD PRESSURE: 110 MMHG | DIASTOLIC BLOOD PRESSURE: 58 MMHG | BODY MASS INDEX: 31.26 KG/M2 | WEIGHT: 230.81 LBS | HEIGHT: 72 IN | OXYGEN SATURATION: 94 %

## 2025-07-16 DIAGNOSIS — E66.09 CLASS 1 OBESITY DUE TO EXCESS CALORIES WITH SERIOUS COMORBIDITY AND BODY MASS INDEX (BMI) OF 31.0 TO 31.9 IN ADULT: Primary | ICD-10-CM

## 2025-07-16 DIAGNOSIS — J43.2 CENTRILOBULAR EMPHYSEMA: ICD-10-CM

## 2025-07-16 DIAGNOSIS — E66.811 CLASS 1 OBESITY DUE TO EXCESS CALORIES WITH SERIOUS COMORBIDITY AND BODY MASS INDEX (BMI) OF 31.0 TO 31.9 IN ADULT: Primary | ICD-10-CM

## 2025-07-16 PROCEDURE — 99999 PR PBB SHADOW E&M-EST. PATIENT-LVL III: CPT | Mod: PBBFAC,,, | Performed by: STUDENT IN AN ORGANIZED HEALTH CARE EDUCATION/TRAINING PROGRAM

## 2025-07-16 RX ORDER — TIOTROPIUM BROMIDE AND OLODATEROL 3.124; 2.736 UG/1; UG/1
1 SPRAY, METERED RESPIRATORY (INHALATION) DAILY
Qty: 1 G | Refills: 5 | Status: SHIPPED | OUTPATIENT
Start: 2025-07-16

## 2025-07-16 NOTE — PROGRESS NOTES
Chief complaint:  Chief Complaint   Patient presents with    COPD      Active problems   Group A COPD    History of present illness -  Established clinic patient for nicotine dependence, symptoms consistent with chronic bronchitis, dyspnea on exertion cough and recurrent episodes of acute bronchitis.      Interval history   07/16/2025   Comes for follow-up, no frequent respiratory symptoms, no mentioned of exacerbation, no massive rescue inhaler use.  Saw pulmonary disease management at some point was not using Trelegy on a daily basis, but now has been using it consistently.    01/06/2025   Comes for follow-up, completed a spirometry, ongoing nicotine use, trying to stop.    Physical examination -   Physical Exam  Vitals and nursing note reviewed.   Constitutional:       Appearance: Normal appearance.   HENT:      Head: Normocephalic and atraumatic.      Nose: Nose normal.      Mouth/Throat:      Mouth: Mucous membranes are moist.   Eyes:      Extraocular Movements: Extraocular movements intact.      Pupils: Pupils are equal, round, and reactive to light.   Cardiovascular:      Rate and Rhythm: Normal rate and regular rhythm.   Pulmonary:      Effort: Pulmonary effort is normal.      Comments: Decreased breath entry in both bases  Abdominal:      General: Abdomen is flat. Bowel sounds are normal.      Palpations: Abdomen is soft.   Musculoskeletal:         General: No swelling.   Skin:     General: Skin is warm.      Capillary Refill: Capillary refill takes less than 2 seconds.   Neurological:      General: No focal deficit present.      Mental Status: He is alert.        Vitals:    07/16/25 0925   BP: (!) 110/58   Pulse: 68   SpO2: (!) 94%   Weight: 104.7 kg (230 lb 13.2 oz)   Height: 6' (1.829 m)      Review of Systems   Constitutional: Negative.    HENT: Negative.     Eyes: Negative.    Respiratory: Negative.     Cardiovascular: Negative.    Gastrointestinal: Negative.    Musculoskeletal: Negative.    Skin:  Negative.    Neurological: Negative.        Relevant data (Independently reviewed by me) -    Prior notes -   PCP and ED notes      Labs -   09/04/2024 BNP wnl  09/04/2024 CMP no hypercapnia  09/04/2024 CBC has absolute eos count of 250     Spirometry -  01/06/2025, spirometry was numerically normal however consistent with mild obstruction     Imaging -   09/04/2024 CXR normal except for chronically elevated left hemidiaphragm present for   years   01/14/2025 LDCT shows evidence of upper lobe predominant emphysema but no suspicious nodule.    Assessment & Plan    Class 1 obesity due to excess calories with serious comorbidity and body mass index (BMI) of 31.0 to 31.9 in adult    Other orders  -     tiotropium-olodateroL (STIOLTO RESPIMAT) 2.5-2.5 mcg/actuation Mist; Inhale 1 puff into the lungs once daily. Controller  Dispense: 1 g; Refill: 5  -     budesonide 180mcg (PULMICORT 180MCG) 180 mcg/actuation AePB; Inhale 2 puffs into the lungs 2 (two) times daily.  Dispense: 1 each; Refill: 11    Compensated COPD, patient has not experienced respiratory problems since last time we met.  No exacerbations, no increasing symptoms, compliant with therapy.    CT scan reassuring for early lung cancer.  [facility] if [facility]ers, gave him prescription for Stiolto Respimat and Pulmicort    RTC in 6 months    David Ruiz   07/16/2025

## (undated) DEVICE — UNDERGLOVES BIOGEL PI SZ 7 LF

## (undated) DEVICE — Device

## (undated) DEVICE — NDL PNEUMO INSUFFLATI 120MM

## (undated) DEVICE — DRAPE ABDOMINAL TIBURON 14X11

## (undated) DEVICE — SUT VICRYL+ 27 UR-6 VIOL

## (undated) DEVICE — CORD LAP 10 DISP

## (undated) DEVICE — COVER OVERHEAD SURG LT BLUE

## (undated) DEVICE — GLOVE SURGICAL LATEX SZ 7

## (undated) DEVICE — BAG TISS RETRV MONARCH 10MM

## (undated) DEVICE — MANIFOLD 4 PORT

## (undated) DEVICE — SEALER LIGASURE MARYLAND 37CM

## (undated) DEVICE — STAPLER INT LINEAR ARTC 3.5-45

## (undated) DEVICE — SUT MONOCRYL 4.0 PS2 CP496G

## (undated) DEVICE — TUBING HEATED INSUFFLATOR

## (undated) DEVICE — SCISSOR 5MMX35CM DIRECT DRIVE

## (undated) DEVICE — ELECTRODE REM PLYHSV RETURN 9

## (undated) DEVICE — ADHESIVE DERMABOND ADVANCED

## (undated) DEVICE — SYR 3CC LUER LOC

## (undated) DEVICE — SEE MEDLINE ITEM 152622

## (undated) DEVICE — DECANTER VIAL ASEPTIC TRANSFER

## (undated) DEVICE — SEE MEDLINE ITEM 157027

## (undated) DEVICE — HANDLE PISTOL GRIP HAND CNTRL

## (undated) DEVICE — DISSECTOR EPIX LAPA 5MMX35CM

## (undated) DEVICE — SEE MEDLINE ITEM 157117

## (undated) DEVICE — TROCAR ENDOPATH XCEL 12X100MM

## (undated) DEVICE — SYR 10CC LUER LOCK

## (undated) DEVICE — NDL SAFETY 25G X 1.5 ECLIPSE

## (undated) DEVICE — KIT ANTIFOG

## (undated) DEVICE — APPLICATOR CHLORAPREP ORN 26ML

## (undated) DEVICE — TROCAR ENDOPATH XCEL 5X100MM

## (undated) DEVICE — CART STAPLE FLEX ETX 3.5MM BLU

## (undated) DEVICE — SUT CTD VICRYL 0 UND BR SUT

## (undated) DEVICE — SEALER LIGASURE LAP 37CM 5MM